# Patient Record
Sex: FEMALE | Race: BLACK OR AFRICAN AMERICAN | NOT HISPANIC OR LATINO | Employment: FULL TIME | URBAN - METROPOLITAN AREA
[De-identification: names, ages, dates, MRNs, and addresses within clinical notes are randomized per-mention and may not be internally consistent; named-entity substitution may affect disease eponyms.]

---

## 2023-03-23 PROBLEM — R73.01 IMPAIRED FASTING GLUCOSE: Status: ACTIVE | Noted: 2023-03-23

## 2023-03-23 PROBLEM — R03.0 PREHYPERTENSION: Status: ACTIVE | Noted: 2023-03-23

## 2023-03-23 PROBLEM — S93.409A SPRAIN OF ANKLE: Status: ACTIVE | Noted: 2023-03-23

## 2023-03-23 PROBLEM — R00.0 TACHYCARDIA: Status: ACTIVE | Noted: 2023-03-23

## 2023-03-23 PROBLEM — S33.5XXA LUMBAR SPRAIN: Status: ACTIVE | Noted: 2023-03-23

## 2023-03-23 PROBLEM — S92.324A CLOSED NONDISPLACED FRACTURE OF SECOND METATARSAL BONE OF RIGHT FOOT: Status: ACTIVE | Noted: 2023-03-23

## 2023-03-23 PROBLEM — M65.9 TENOSYNOVITIS OF LEFT WRIST: Status: ACTIVE | Noted: 2023-03-23

## 2023-03-23 PROBLEM — L30.9 ECZEMA: Status: ACTIVE | Noted: 2023-03-23

## 2023-03-23 PROBLEM — M25.671 DECREASED RANGE OF MOTION OF RIGHT ANKLE: Status: ACTIVE | Noted: 2023-03-23

## 2023-03-23 PROBLEM — R35.0 INCREASED FREQUENCY OF URINATION: Status: ACTIVE | Noted: 2023-03-23

## 2023-03-23 PROBLEM — N60.19 FIBROCYSTIC BREAST: Status: ACTIVE | Noted: 2023-03-23

## 2023-03-23 PROBLEM — Z98.890 S/P BREAST RECONSTRUCTION: Status: ACTIVE | Noted: 2023-03-23

## 2023-03-23 PROBLEM — E66.9 OBESITY: Status: ACTIVE | Noted: 2023-03-23

## 2023-03-23 PROBLEM — J02.9 SORE THROAT: Status: ACTIVE | Noted: 2023-03-23

## 2023-03-23 PROBLEM — E74.39 OTHER DISORDERS OF INTESTINAL CARBOHYDRATE ABSORPTION: Status: ACTIVE | Noted: 2023-03-23

## 2023-03-23 PROBLEM — J32.9 SINUSITIS: Status: ACTIVE | Noted: 2023-03-23

## 2023-03-23 PROBLEM — R92.2 DENSE BREAST: Status: ACTIVE | Noted: 2023-03-23

## 2023-03-23 PROBLEM — M25.532 LEFT WRIST PAIN: Status: ACTIVE | Noted: 2023-03-23

## 2023-03-23 PROBLEM — A60.00 HERPES GENITALIA: Status: ACTIVE | Noted: 2023-03-23

## 2023-03-23 PROBLEM — G47.33 OBSTRUCTIVE SLEEP APNEA SYNDROME: Status: ACTIVE | Noted: 2023-03-23

## 2023-03-23 PROBLEM — K76.0 FATTY LIVER: Status: ACTIVE | Noted: 2023-03-23

## 2023-03-23 PROBLEM — R53.83 FATIGUE: Status: ACTIVE | Noted: 2023-03-23

## 2023-03-23 PROBLEM — R29.898 IMPAIRED STRENGTH OF LOWER EXTREMITY: Status: ACTIVE | Noted: 2023-03-23

## 2023-03-23 PROBLEM — R82.90 ABNORMAL URINE FINDINGS: Status: ACTIVE | Noted: 2023-03-23

## 2023-03-23 PROBLEM — R10.32 ABDOMINAL PAIN, LLQ (LEFT LOWER QUADRANT): Status: ACTIVE | Noted: 2023-03-23

## 2023-03-23 PROBLEM — N39.0 LOWER URINARY TRACT INFECTIOUS DISEASE: Status: ACTIVE | Noted: 2023-03-23

## 2023-03-23 PROBLEM — S92.909A FRACTURE OF FOOT: Status: ACTIVE | Noted: 2023-03-23

## 2023-03-23 PROBLEM — M54.2 NECK PAIN: Status: ACTIVE | Noted: 2023-03-23

## 2023-03-23 PROBLEM — Z15.01 GENETIC SUSCEPTIBILITY TO MALIGNANT NEOPLASM OF BREAST: Status: ACTIVE | Noted: 2023-03-23

## 2023-03-23 PROBLEM — M65.932 TENOSYNOVITIS OF LEFT WRIST: Status: ACTIVE | Noted: 2023-03-23

## 2023-03-23 PROBLEM — R63.2 POLYPHAGIA: Status: ACTIVE | Noted: 2023-03-23

## 2023-03-23 PROBLEM — R92.30 DENSE BREAST: Status: ACTIVE | Noted: 2023-03-23

## 2023-03-23 PROBLEM — K21.00 GASTROESOPHAGEAL REFLUX DISEASE WITH ESOPHAGITIS: Status: ACTIVE | Noted: 2023-03-23

## 2023-03-23 PROBLEM — N76.0 BACTERIAL VAGINOSIS: Status: ACTIVE | Noted: 2023-03-23

## 2023-03-23 PROBLEM — M79.671 RIGHT FOOT PAIN: Status: ACTIVE | Noted: 2023-03-23

## 2023-03-23 PROBLEM — N76.0 VAGINITIS: Status: ACTIVE | Noted: 2023-03-23

## 2023-03-23 PROBLEM — S92.334A CLOSED NONDISPLACED FRACTURE OF THIRD METATARSAL BONE OF RIGHT FOOT: Status: ACTIVE | Noted: 2023-03-23

## 2023-03-23 PROBLEM — R07.89 ATYPICAL CHEST PAIN: Status: ACTIVE | Noted: 2023-03-23

## 2023-03-23 PROBLEM — M25.531 RIGHT WRIST PAIN: Status: ACTIVE | Noted: 2023-03-23

## 2023-03-23 PROBLEM — M25.579 JOINT PAIN, FOOT: Status: ACTIVE | Noted: 2023-03-23

## 2023-03-23 PROBLEM — M25.511 RIGHT SHOULDER PAIN: Status: ACTIVE | Noted: 2023-03-23

## 2023-03-23 PROBLEM — S93.621A: Status: ACTIVE | Noted: 2023-03-23

## 2023-03-23 PROBLEM — E55.9 VITAMIN D DEFICIENCY: Status: ACTIVE | Noted: 2023-03-23

## 2023-03-23 PROBLEM — R09.81 NASAL CONGESTION: Status: ACTIVE | Noted: 2023-03-23

## 2023-03-23 PROBLEM — B96.89 BACTERIAL VAGINOSIS: Status: ACTIVE | Noted: 2023-03-23

## 2023-03-23 PROBLEM — S92.344A CLOSED NONDISPLACED FRACTURE OF FOURTH METATARSAL BONE OF RIGHT FOOT: Status: ACTIVE | Noted: 2023-03-23

## 2023-03-23 PROBLEM — I48.91 AFIB (MULTI): Status: ACTIVE | Noted: 2023-03-23

## 2023-03-23 PROBLEM — K21.9 GASTROESOPHAGEAL REFLUX DISEASE: Status: ACTIVE | Noted: 2023-03-23

## 2023-03-23 RX ORDER — METOPROLOL SUCCINATE 25 MG/1
0.5 TABLET, EXTENDED RELEASE ORAL DAILY
COMMUNITY
Start: 2017-12-21 | End: 2023-11-27

## 2023-03-23 RX ORDER — IBUPROFEN 800 MG/1
800 TABLET ORAL
COMMUNITY
Start: 2013-11-13 | End: 2024-04-01 | Stop reason: SDUPTHER

## 2023-03-23 RX ORDER — TRIAMCINOLONE ACETONIDE 1 MG/G
OINTMENT TOPICAL 2 TIMES DAILY PRN
COMMUNITY
Start: 2020-01-29 | End: 2023-08-27 | Stop reason: ALTCHOICE

## 2023-03-23 RX ORDER — ERGOCALCIFEROL 1.25 MG/1
1 CAPSULE ORAL
COMMUNITY
Start: 2022-01-25 | End: 2023-03-30 | Stop reason: SDUPTHER

## 2023-03-23 RX ORDER — CYCLOBENZAPRINE HCL 5 MG
TABLET ORAL 3 TIMES DAILY PRN
COMMUNITY
Start: 2022-08-31 | End: 2023-03-30 | Stop reason: SDUPTHER

## 2023-03-30 ENCOUNTER — OFFICE VISIT (OUTPATIENT)
Dept: PRIMARY CARE | Facility: CLINIC | Age: 48
End: 2023-03-30
Payer: COMMERCIAL

## 2023-03-30 VITALS
WEIGHT: 218 LBS | DIASTOLIC BLOOD PRESSURE: 87 MMHG | HEIGHT: 65 IN | SYSTOLIC BLOOD PRESSURE: 126 MMHG | HEART RATE: 83 BPM | BODY MASS INDEX: 36.32 KG/M2

## 2023-03-30 DIAGNOSIS — I48.91 ATRIAL FIBRILLATION, UNSPECIFIED TYPE (MULTI): ICD-10-CM

## 2023-03-30 DIAGNOSIS — R00.0 TACHYCARDIA: Primary | ICD-10-CM

## 2023-03-30 DIAGNOSIS — E11.9 TYPE 2 DIABETES MELLITUS WITHOUT COMPLICATION, WITHOUT LONG-TERM CURRENT USE OF INSULIN (MULTI): ICD-10-CM

## 2023-03-30 DIAGNOSIS — E55.9 VITAMIN D DEFICIENCY: ICD-10-CM

## 2023-03-30 DIAGNOSIS — S33.5XXD LUMBAR SPRAIN, SUBSEQUENT ENCOUNTER: ICD-10-CM

## 2023-03-30 DIAGNOSIS — C50.919 FAMILIAL CANCER OF BREAST, UNSPECIFIED LATERALITY (MULTI): ICD-10-CM

## 2023-03-30 DIAGNOSIS — R03.0 PREHYPERTENSION: ICD-10-CM

## 2023-03-30 PROBLEM — Z90.13 HISTORY OF BILATERAL MASTECTOMY: Status: RESOLVED | Noted: 2022-11-30 | Resolved: 2023-03-30

## 2023-03-30 PROBLEM — D64.9 ANEMIA: Status: ACTIVE | Noted: 2022-11-30

## 2023-03-30 PROBLEM — E03.9 HYPOTHYROIDISM: Status: ACTIVE | Noted: 2022-11-30

## 2023-03-30 LAB
ALANINE AMINOTRANSFERASE (SGPT) (U/L) IN SER/PLAS: 35 U/L (ref 7–45)
ALBUMIN (G/DL) IN SER/PLAS: 4.4 G/DL (ref 3.4–5)
ALKALINE PHOSPHATASE (U/L) IN SER/PLAS: 73 U/L (ref 33–110)
ANION GAP IN SER/PLAS: 9 MMOL/L (ref 10–20)
ASPARTATE AMINOTRANSFERASE (SGOT) (U/L) IN SER/PLAS: 20 U/L (ref 9–39)
BACTERIA, URINE: ABNORMAL /HPF
BILIRUBIN TOTAL (MG/DL) IN SER/PLAS: 0.3 MG/DL (ref 0–1.2)
CALCIDIOL (25 OH VITAMIN D3) (NG/ML) IN SER/PLAS: 37 NG/ML
CALCIUM (MG/DL) IN SER/PLAS: 9.6 MG/DL (ref 8.6–10.6)
CARBON DIOXIDE, TOTAL (MMOL/L) IN SER/PLAS: 30 MMOL/L (ref 21–32)
CHLORIDE (MMOL/L) IN SER/PLAS: 109 MMOL/L (ref 98–107)
CHOLESTEROL (MG/DL) IN SER/PLAS: 171 MG/DL (ref 0–199)
CHOLESTEROL IN HDL (MG/DL) IN SER/PLAS: 56.6 MG/DL
CHOLESTEROL/HDL RATIO: 3
COBALAMIN (VITAMIN B12) (PG/ML) IN SER/PLAS: 295 PG/ML (ref 211–911)
CREATININE (MG/DL) IN SER/PLAS: 0.73 MG/DL (ref 0.5–1.05)
GFR FEMALE: >90 ML/MIN/1.73M2
GLUCOSE (MG/DL) IN SER/PLAS: 100 MG/DL (ref 74–99)
LDL: 104 MG/DL (ref 0–99)
MUCUS, URINE: ABNORMAL /LPF
POTASSIUM (MMOL/L) IN SER/PLAS: 4.1 MMOL/L (ref 3.5–5.3)
PROTEIN TOTAL: 6.6 G/DL (ref 6.4–8.2)
RBC, URINE: <1 /HPF (ref 0–5)
SODIUM (MMOL/L) IN SER/PLAS: 144 MMOL/L (ref 136–145)
SQUAMOUS EPITHELIAL CELLS, URINE: 2 /HPF
THYROTROPIN (MIU/L) IN SER/PLAS BY DETECTION LIMIT <= 0.05 MIU/L: 1.35 MIU/L (ref 0.44–3.98)
TRIGLYCERIDE (MG/DL) IN SER/PLAS: 52 MG/DL (ref 0–149)
UREA NITROGEN (MG/DL) IN SER/PLAS: 14 MG/DL (ref 6–23)
VLDL: 10 MG/DL (ref 0–40)
WBC, URINE: 2 /HPF (ref 0–5)

## 2023-03-30 PROCEDURE — 36415 COLL VENOUS BLD VENIPUNCTURE: CPT | Performed by: FAMILY MEDICINE

## 2023-03-30 PROCEDURE — 1036F TOBACCO NON-USER: CPT | Performed by: FAMILY MEDICINE

## 2023-03-30 PROCEDURE — 3074F SYST BP LT 130 MM HG: CPT | Performed by: FAMILY MEDICINE

## 2023-03-30 PROCEDURE — 83036 HEMOGLOBIN GLYCOSYLATED A1C: CPT

## 2023-03-30 PROCEDURE — 82306 VITAMIN D 25 HYDROXY: CPT

## 2023-03-30 PROCEDURE — 99214 OFFICE O/P EST MOD 30 MIN: CPT | Performed by: FAMILY MEDICINE

## 2023-03-30 PROCEDURE — 81001 URINALYSIS AUTO W/SCOPE: CPT

## 2023-03-30 PROCEDURE — 99396 PREV VISIT EST AGE 40-64: CPT | Performed by: FAMILY MEDICINE

## 2023-03-30 PROCEDURE — 82607 VITAMIN B-12: CPT

## 2023-03-30 PROCEDURE — 80061 LIPID PANEL: CPT

## 2023-03-30 PROCEDURE — 80053 COMPREHEN METABOLIC PANEL: CPT

## 2023-03-30 PROCEDURE — 3044F HG A1C LEVEL LT 7.0%: CPT | Performed by: FAMILY MEDICINE

## 2023-03-30 PROCEDURE — 84443 ASSAY THYROID STIM HORMONE: CPT

## 2023-03-30 PROCEDURE — 3079F DIAST BP 80-89 MM HG: CPT | Performed by: FAMILY MEDICINE

## 2023-03-30 PROCEDURE — 87086 URINE CULTURE/COLONY COUNT: CPT

## 2023-03-30 PROCEDURE — 85027 COMPLETE CBC AUTOMATED: CPT

## 2023-03-30 RX ORDER — CYCLOBENZAPRINE HCL 5 MG
5 TABLET ORAL 3 TIMES DAILY PRN
Qty: 30 TABLET | Refills: 1 | OUTPATIENT
Start: 2023-03-30 | End: 2024-05-14

## 2023-03-30 RX ORDER — ERGOCALCIFEROL 1.25 MG/1
50000 CAPSULE ORAL
Qty: 13 CAPSULE | Refills: 1 | Status: SHIPPED | OUTPATIENT
Start: 2023-03-30 | End: 2024-04-02 | Stop reason: SDUPTHER

## 2023-03-31 ENCOUNTER — TELEPHONE (OUTPATIENT)
Dept: PRIMARY CARE | Facility: CLINIC | Age: 48
End: 2023-03-31
Payer: COMMERCIAL

## 2023-03-31 LAB
ERYTHROCYTE DISTRIBUTION WIDTH (RATIO) BY AUTOMATED COUNT: 14.4 % (ref 11.5–14.5)
ERYTHROCYTE MEAN CORPUSCULAR HEMOGLOBIN CONCENTRATION (G/DL) BY AUTOMATED: 30.7 G/DL (ref 32–36)
ERYTHROCYTE MEAN CORPUSCULAR VOLUME (FL) BY AUTOMATED COUNT: 88 FL (ref 80–100)
ERYTHROCYTES (10*6/UL) IN BLOOD BY AUTOMATED COUNT: 5.18 X10E12/L (ref 4–5.2)
ESTIMATED AVERAGE GLUCOSE FOR HBA1C: 111 MG/DL
HEMATOCRIT (%) IN BLOOD BY AUTOMATED COUNT: 45.6 % (ref 36–46)
HEMOGLOBIN (G/DL) IN BLOOD: 14 G/DL (ref 12–16)
HEMOGLOBIN A1C/HEMOGLOBIN TOTAL IN BLOOD: 5.5 %
LEUKOCYTES (10*3/UL) IN BLOOD BY AUTOMATED COUNT: 5.1 X10E9/L (ref 4.4–11.3)
NRBC (PER 100 WBCS) BY AUTOMATED COUNT: 0 /100 WBC (ref 0–0)
PLATELETS (10*3/UL) IN BLOOD AUTOMATED COUNT: 257 X10E9/L (ref 150–450)

## 2023-03-31 NOTE — TELEPHONE ENCOUNTER
----- Message from Macho Hicks MD sent at 3/31/2023  4:34 AM EDT -----  Please call the patient regarding her abnormal result.  B12 low rec b12 inj 1000 mcg weekly for 4 weeks then once a month  All other labs normal NO Diabetes.

## 2023-04-01 PROBLEM — E53.8 VITAMIN B12 DEFICIENCY: Status: ACTIVE | Noted: 2023-04-01

## 2023-04-01 NOTE — PROGRESS NOTES
Reason for Visit: Annual Physical Exam    HPI:was seeing different primary for weight loss/       Active Problem List  Patient Active Problem List   Diagnosis    Abdominal pain, LLQ (left lower quadrant)    Abnormal urine findings    Afib (CMS/HCC)    Atypical chest pain    Bacterial vaginosis    Closed nondisplaced fracture of fourth metatarsal bone of right foot    Closed nondisplaced fracture of second metatarsal bone of right foot    Closed nondisplaced fracture of third metatarsal bone of right foot    Dense breast    Decreased range of motion of right ankle    Eczema    Fatigue    Fatty liver    Fibrocystic breast    Fracture of foot    Gastroesophageal reflux disease    Gastroesophageal reflux disease with esophagitis    Genetic susceptibility to malignant neoplasm of breast    Herpes genitalia    Impaired fasting glucose    Impaired strength of lower extremity    Increased frequency of urination    Left wrist pain    Right wrist pain    Lower urinary tract infectious disease    Lumbar sprain    Nasal congestion    Neck pain    Obesity    Obstructive sleep apnea syndrome    Other disorders of intestinal carbohydrate absorption    Polyphagia    Prehypertension    Right foot pain    Joint pain, foot    Right shoulder pain    S/P breast reconstruction    Sinusitis    Sore throat    Sprain of ankle    Sprain of ligament of tarsometatarsal joint, right, initial encounter    Tachycardia    Tenosynovitis of left wrist    Vaginitis    Vitamin D deficiency    Anemia    Familial cancer of breast (CMS/HCC)    Hypothyroidism    Type 2 diabetes mellitus (CMS/HCC)    Vitamin B12 deficiency       Comprehensive Medical/Surgical/Social/Family History  Past Medical History:   Diagnosis Date    Encounter for screening for malignant neoplasm of cervix 02/05/2016    Pap smear for cervical cancer screening    History of bilateral mastectomy 11/30/2022    Other specified health status     Medical history non-contributory     Past  "Surgical History:   Procedure Laterality Date     SECTION, CLASSIC  2016     Section    MR PELVIS ANGIO W IV CONTRAST  2016    MR PELVIS ANGIO W IV CONTRAST 2016 [facility]    OTHER SURGICAL HISTORY  2016    Breast Reconstruction With Tissue Expander Bilateral    OTHER SURGICAL HISTORY  2016    Simple Mastectomy Bilateral    OTHER SURGICAL HISTORY  2016    Oophorectomy - Bilat (Removal Of Both Ovaries) Laparoscopic    OTHER SURGICAL HISTORY  2016    Breast Surgery Reconstruction With Free Flap     Social History     Social History Narrative    Not on file         Allergies and Medications  Patient has no known allergies.  Current Outpatient Medications on File Prior to Visit   Medication Sig Dispense Refill    ibuprofen 800 mg tablet Take 1 tablet (800 mg) by mouth. W FOOD FOR 10 DAYS      metoprolol succinate XL (Toprol-XL) 25 mg 24 hr tablet Take 0.5 tablets (12.5 mg) by mouth once daily.      triamcinolone (Kenalog) 0.1 % ointment Apply topically 2 times a day as needed.      [DISCONTINUED] cyclobenzaprine (Flexeril) 5 mg tablet Take by mouth 3 times a day as needed. 1-2 TABLETS PRN      [DISCONTINUED] ergocalciferol (Vitamin D-2) 1.25 MG (78005 UT) capsule Take 1 capsule (50,000 Units) by mouth 1 (one) time per week. 13 capsule 1    [DISCONTINUED] ergocalciferol (Vitamin D-2) 1.25 MG (78259 UT) capsule Take 1 capsule (1,250 mcg) by mouth 1 (one) time per week.       No current facility-administered medications on file prior to visit.         ROS otherwise negative aside from what was mentioned above in HPI.    Vitals  Blood Pressure 126/87   Pulse 83   Height 1.651 m (5' 5\")   Weight 98.9 kg (218 lb)   Body Mass Index 36.28 kg/m²   Body mass index is 36.28 kg/m².  Physical Exam  Gen: Alert, NAD  HEENT:  PERRLA, EOMI, conjunctiva and sclera normal in appearance. External auditory canals/TMs normal; Oral cavity and posterior pharynx without " lesions/exudate  Neck:  Supple with FROM; No masses/nodes palpable; Thyroid nontender and without nodules; No CLAY  Respiratory:  Lungs CTAB  Cardiovascular:  Heart RRR. No M/R/G. Peripheral pulses equal bilaterally  Abdomen:  Soft, nontender, BS present throughout; No R/G/R; No HSM or masses palpated  Extremities:  FROM all extremities; Muscle strength grossly normal with good tone  Neuro:  CN II-XII intact; Reflexes 2+/2+; Gross motor and sensory intact  Skin:  No suspicious lesions present  Breast: No masses, skin lesions or nipple discharges, no axillary lymphadenopathy    Assessment and Plan:  Problem List Items Addressed This Visit          Circulatory    Afib (CMS/[facility])    Current Assessment & Plan     C/w metoprolol         Prehypertension    Tachycardia - Primary    Current Assessment & Plan     C/w metoprolol         Relevant Medications    cyclobenzaprine (Flexeril) 5 mg tablet    Other Relevant Orders    CBC (Completed)    Comprehensive Metabolic Panel (Completed)    TSH with reflex to Free T4 if abnormal (Completed)       Musculoskeletal    Lumbar sprain    Current Assessment & Plan     Start Home exercise program            Endocrine/Metabolic    Vitamin D deficiency    Relevant Medications    ergocalciferol (Vitamin D-2) 1.25 MG (08033 UT) capsule    Other Relevant Orders    Vitamin D, Total (Completed)    Type 2 diabetes mellitus (CMS/[facility])    Relevant Orders    Lipid Panel (Completed)    Vitamin B12 (Completed)    Hemoglobin A1c (Completed)    Urinalysis Microscopic Only (Completed)    Urine Culture       Other    Familial cancer of breast (CMS/[facility])

## 2023-04-03 ENCOUNTER — TELEPHONE (OUTPATIENT)
Dept: PRIMARY CARE | Facility: CLINIC | Age: 48
End: 2023-04-03
Payer: COMMERCIAL

## 2023-04-03 DIAGNOSIS — E53.8 VITAMIN B12 DEFICIENCY: ICD-10-CM

## 2023-04-04 DIAGNOSIS — E53.8 VITAMIN B12 DEFICIENCY: ICD-10-CM

## 2023-04-04 LAB — URINE CULTURE: ABNORMAL

## 2023-04-06 RX ORDER — CYANOCOBALAMIN 1000 UG/ML
1000 INJECTION, SOLUTION INTRAMUSCULAR; SUBCUTANEOUS
Qty: 3 ML | Refills: 1 | Status: SHIPPED | OUTPATIENT
Start: 2023-04-06 | End: 2023-05-25

## 2023-05-25 DIAGNOSIS — E53.8 VITAMIN B12 DEFICIENCY: ICD-10-CM

## 2023-05-25 RX ORDER — CYANOCOBALAMIN 1000 UG/ML
1000 INJECTION, SOLUTION INTRAMUSCULAR; SUBCUTANEOUS
Qty: 1 ML | Refills: 5 | Status: SHIPPED | OUTPATIENT
Start: 2023-05-25 | End: 2024-01-26

## 2023-06-22 LAB — URINE CULTURE: NORMAL

## 2023-08-27 ASSESSMENT — ENCOUNTER SYMPTOMS
DIARRHEA: 1
VOMITING: 0
FLATUS: 1
MYALGIAS: 0
FREQUENCY: 0
DYSURIA: 1
ANOREXIA: 0
HEMATOCHEZIA: 0
WEIGHT LOSS: 0
ARTHRALGIAS: 0
HEADACHES: 0
HEMATURIA: 0
FEVER: 0
CONSTIPATION: 1
NAUSEA: 0
BELCHING: 0
ABDOMINAL PAIN: 1

## 2023-08-28 ENCOUNTER — OFFICE VISIT (OUTPATIENT)
Dept: PRIMARY CARE | Facility: CLINIC | Age: 48
End: 2023-08-28
Payer: COMMERCIAL

## 2023-08-28 VITALS
HEIGHT: 65 IN | DIASTOLIC BLOOD PRESSURE: 87 MMHG | SYSTOLIC BLOOD PRESSURE: 127 MMHG | BODY MASS INDEX: 37.49 KG/M2 | HEART RATE: 77 BPM | WEIGHT: 225 LBS

## 2023-08-28 DIAGNOSIS — R10.32 LLQ PAIN: Primary | ICD-10-CM

## 2023-08-28 DIAGNOSIS — R03.0 PREHYPERTENSION: ICD-10-CM

## 2023-08-28 DIAGNOSIS — K21.9 GASTROESOPHAGEAL REFLUX DISEASE WITHOUT ESOPHAGITIS: ICD-10-CM

## 2023-08-28 PROBLEM — E11.9 TYPE 2 DIABETES MELLITUS (MULTI): Status: RESOLVED | Noted: 2022-11-30 | Resolved: 2023-08-28

## 2023-08-28 LAB
ANION GAP IN SER/PLAS: 11 MMOL/L (ref 10–20)
BASOPHILS (10*3/UL) IN BLOOD BY AUTOMATED COUNT: 0.02 X10E9/L (ref 0–0.1)
BASOPHILS/100 LEUKOCYTES IN BLOOD BY AUTOMATED COUNT: 0.4 % (ref 0–2)
CALCIUM (MG/DL) IN SER/PLAS: 9.7 MG/DL (ref 8.6–10.6)
CARBON DIOXIDE, TOTAL (MMOL/L) IN SER/PLAS: 29 MMOL/L (ref 21–32)
CHLORIDE (MMOL/L) IN SER/PLAS: 104 MMOL/L (ref 98–107)
CREATININE (MG/DL) IN SER/PLAS: 0.69 MG/DL (ref 0.5–1.05)
EOSINOPHILS (10*3/UL) IN BLOOD BY AUTOMATED COUNT: 0.09 X10E9/L (ref 0–0.7)
EOSINOPHILS/100 LEUKOCYTES IN BLOOD BY AUTOMATED COUNT: 2 % (ref 0–6)
ERYTHROCYTE DISTRIBUTION WIDTH (RATIO) BY AUTOMATED COUNT: 14 % (ref 11.5–14.5)
ERYTHROCYTE MEAN CORPUSCULAR HEMOGLOBIN CONCENTRATION (G/DL) BY AUTOMATED: 31.7 G/DL (ref 32–36)
ERYTHROCYTE MEAN CORPUSCULAR VOLUME (FL) BY AUTOMATED COUNT: 89 FL (ref 80–100)
ERYTHROCYTES (10*6/UL) IN BLOOD BY AUTOMATED COUNT: 5.2 X10E12/L (ref 4–5.2)
GFR FEMALE: >90 ML/MIN/1.73M2
GLUCOSE (MG/DL) IN SER/PLAS: 95 MG/DL (ref 74–99)
HEMATOCRIT (%) IN BLOOD BY AUTOMATED COUNT: 46.1 % (ref 36–46)
HEMOGLOBIN (G/DL) IN BLOOD: 14.6 G/DL (ref 12–16)
IMMATURE GRANULOCYTES/100 LEUKOCYTES IN BLOOD BY AUTOMATED COUNT: 0.2 % (ref 0–0.9)
LEUKOCYTES (10*3/UL) IN BLOOD BY AUTOMATED COUNT: 4.6 X10E9/L (ref 4.4–11.3)
LYMPHOCYTES (10*3/UL) IN BLOOD BY AUTOMATED COUNT: 2.32 X10E9/L (ref 1.2–4.8)
LYMPHOCYTES/100 LEUKOCYTES IN BLOOD BY AUTOMATED COUNT: 50.4 % (ref 13–44)
MONOCYTES (10*3/UL) IN BLOOD BY AUTOMATED COUNT: 0.35 X10E9/L (ref 0.1–1)
MONOCYTES/100 LEUKOCYTES IN BLOOD BY AUTOMATED COUNT: 7.6 % (ref 2–10)
NEUTROPHILS (10*3/UL) IN BLOOD BY AUTOMATED COUNT: 1.81 X10E9/L (ref 1.2–7.7)
NEUTROPHILS/100 LEUKOCYTES IN BLOOD BY AUTOMATED COUNT: 39.4 % (ref 40–80)
NRBC (PER 100 WBCS) BY AUTOMATED COUNT: 0 /100 WBC (ref 0–0)
PLATELETS (10*3/UL) IN BLOOD AUTOMATED COUNT: 267 X10E9/L (ref 150–450)
POTASSIUM (MMOL/L) IN SER/PLAS: 4.2 MMOL/L (ref 3.5–5.3)
SODIUM (MMOL/L) IN SER/PLAS: 140 MMOL/L (ref 136–145)
UREA NITROGEN (MG/DL) IN SER/PLAS: 10 MG/DL (ref 6–23)

## 2023-08-28 PROCEDURE — 85025 COMPLETE CBC W/AUTO DIFF WBC: CPT

## 2023-08-28 PROCEDURE — 1036F TOBACCO NON-USER: CPT | Performed by: FAMILY MEDICINE

## 2023-08-28 PROCEDURE — 99214 OFFICE O/P EST MOD 30 MIN: CPT | Performed by: FAMILY MEDICINE

## 2023-08-28 PROCEDURE — 80048 BASIC METABOLIC PNL TOTAL CA: CPT

## 2023-08-28 RX ORDER — PANTOPRAZOLE SODIUM 40 MG/1
40 TABLET, DELAYED RELEASE ORAL DAILY
Qty: 90 TABLET | Refills: 1 | Status: SHIPPED | OUTPATIENT
Start: 2023-08-28 | End: 2024-05-29

## 2023-08-28 ASSESSMENT — PATIENT HEALTH QUESTIONNAIRE - PHQ9
SUM OF ALL RESPONSES TO PHQ9 QUESTIONS 1 AND 2: 0
1. LITTLE INTEREST OR PLEASURE IN DOING THINGS: NOT AT ALL
2. FEELING DOWN, DEPRESSED OR HOPELESS: NOT AT ALL

## 2023-09-04 PROBLEM — R10.32 LLQ PAIN: Status: ACTIVE | Noted: 2023-09-04

## 2023-09-04 ASSESSMENT — ENCOUNTER SYMPTOMS
FATIGUE: 1
FREQUENCY: 0
ABDOMINAL PAIN: 1
RESPIRATORY NEGATIVE: 1
NAUSEA: 0
BELCHING: 0
DIARRHEA: 1
HEMATOCHEZIA: 0
WEIGHT LOSS: 0
HEMATURIA: 0
ARTHRALGIAS: 0
CONSTIPATION: 1
APPETITE CHANGE: 1
HEADACHES: 0
DYSURIA: 1
VOMITING: 0
ANOREXIA: 0
FEVER: 0
FLATUS: 1
MYALGIAS: 0

## 2023-09-04 NOTE — PROGRESS NOTES
Subjective   Patient ID: Ayanna Willard is a 47 y.o. female who presents for Follow-up (Abd pain 1 month ago ), Knee Pain (Left knee fall), and Abdominal Pain.      Knee Pain     Abdominal Pain  This is a recurrent problem. The current episode started more than 1 month ago. The onset quality is gradual. The problem occurs daily. The problem has been waxing and waning. The pain is located in the LLQ. The pain is at a severity of 5/10. The quality of the pain is sharp. The abdominal pain does not radiate. Associated symptoms include constipation, diarrhea, dysuria and flatus. Pertinent negatives include no anorexia, arthralgias, belching, fever, frequency, headaches, hematochezia, hematuria, melena, myalgias, nausea, vomiting or weight loss. The pain is aggravated by bowel movement and urination.     Current Outpatient Medications on File Prior to Visit   Medication Sig Dispense Refill    cyanocobalamin (Vitamin B-12) 1,000 mcg/mL injection INJECT 1 ML (1,000 MCG) INTO THE SHOULDER, THIGH, OR BUTTOCKS EVERY 30 (THIRTY) DAYS. 1 mL 5    cyclobenzaprine (Flexeril) 5 mg tablet Take 1 tablet (5 mg) by mouth 3 times a day as needed for muscle spasms. 1-2 TABLETS PRN 30 tablet 1    ergocalciferol (Vitamin D-2) 1.25 MG (81850 UT) capsule Take 1 capsule (50,000 Units) by mouth 1 (one) time per week. 13 capsule 1    ibuprofen 800 mg tablet Take 1 tablet (800 mg) by mouth. W FOOD FOR 10 DAYS      metoprolol succinate XL (Toprol-XL) 25 mg 24 hr tablet Take 0.5 tablets (12.5 mg) by mouth once daily.       No current facility-administered medications on file prior to visit.        Review of Systems   Constitutional:  Positive for appetite change and fatigue. Negative for fever and weight loss.   HENT: Negative.     Respiratory: Negative.     Gastrointestinal:  Positive for abdominal pain, constipation, diarrhea and flatus. Negative for anorexia, hematochezia, melena, nausea and vomiting.   Genitourinary:  Positive for dysuria.  "Negative for frequency and hematuria.   Musculoskeletal:  Negative for arthralgias and myalgias.   Neurological:  Negative for headaches.       Objective   Blood Pressure 127/87   Pulse 77   Height 1.651 m (5' 5\")   Weight 102 kg (225 lb)   Body Mass Index 37.44 kg/m²   BSA: 2.16 meters squared  Growth percentiles: Facility age limit for growth %dima is 20 years. Facility age limit for growth %dima is 20 years.   Office Visit on 08/28/2023   Component Date Value Ref Range Status    Glucose 08/28/2023 95  74 - 99 mg/dL Final    Sodium 08/28/2023 140  136 - 145 mmol/L Final    Potassium 08/28/2023 4.2  3.5 - 5.3 mmol/L Final    Chloride 08/28/2023 104  98 - 107 mmol/L Final    Bicarbonate 08/28/2023 29  21 - 32 mmol/L Final    Anion Gap 08/28/2023 11  10 - 20 mmol/L Final    Urea Nitrogen 08/28/2023 10  6 - 23 mg/dL Final    Creatinine 08/28/2023 0.69  0.50 - 1.05 mg/dL Final    GFR Female 08/28/2023 >90  >90 mL/min/1.73m2 Final     CALCULATIONS OF ESTIMATED GFR ARE PERFORMED   USING THE 2021 CKD-EPI STUDY REFIT EQUATION   WITHOUT THE RACE VARIABLE FOR THE IDMS-TRACEABLE   CREATININE METHODS.    https://jasn.asnjournals.org/content/early/2021/09/22/ASN.3962101247    Calcium 08/28/2023 9.7  8.6 - 10.6 mg/dL Final    WBC 08/28/2023 4.6  4.4 - 11.3 x10E9/L Final    nRBC 08/28/2023 0.0  0.0 - 0.0 /100 WBC Final    RBC 08/28/2023 5.20  4.00 - 5.20 x10E12/L Final    Hemoglobin 08/28/2023 14.6  12.0 - 16.0 g/dL Final    Hematocrit 08/28/2023 46.1 (H)  36.0 - 46.0 % Final    MCV 08/28/2023 89  80 - 100 fL Final    MCHC 08/28/2023 31.7 (L)  32.0 - 36.0 g/dL Final    Platelets 08/28/2023 267  150 - 450 x10E9/L Final    RDW 08/28/2023 14.0  11.5 - 14.5 % Final    Neutrophils % 08/28/2023 39.4  40.0 - 80.0 % Final    Immature Granulocytes %, Automated 08/28/2023 0.2  0.0 - 0.9 % Final     Immature Granulocyte Count (IG) includes promyelocytes,    myelocytes and metamyelocytes but does not include bands.   Percent differential " counts (%) should be interpreted in the   context of the absolute cell counts (cells/L).    Lymphocytes % 08/28/2023 50.4  13.0 - 44.0 % Final    Monocytes % 08/28/2023 7.6  2.0 - 10.0 % Final    Eosinophils % 08/28/2023 2.0  0.0 - 6.0 % Final    Basophils % 08/28/2023 0.4  0.0 - 2.0 % Final    Neutrophils Absolute 08/28/2023 1.81  1.20 - 7.70 x10E9/L Final    Lymphocytes Absolute 08/28/2023 2.32  1.20 - 4.80 x10E9/L Final    Monocytes Absolute 08/28/2023 0.35  0.10 - 1.00 x10E9/L Final    Eosinophils Absolute 08/28/2023 0.09  0.00 - 0.70 x10E9/L Final    Basophils Absolute 08/28/2023 0.02  0.00 - 0.10 x10E9/L Final      Physical Exam  Vitals and nursing note reviewed.   Constitutional:       Appearance: Normal appearance.   HENT:      Head: Normocephalic and atraumatic.      Right Ear: Tympanic membrane normal.      Left Ear: Tympanic membrane normal.      Nose: Nose normal.      Mouth/Throat:      Mouth: Mucous membranes are moist.   Eyes:      Pupils: Pupils are equal, round, and reactive to light.   Cardiovascular:      Rate and Rhythm: Normal rate and regular rhythm.      Pulses: Normal pulses.      Heart sounds: Normal heart sounds.   Pulmonary:      Effort: Pulmonary effort is normal.      Breath sounds: Normal breath sounds.   Abdominal:      General: Abdomen is flat. Bowel sounds are normal.      Palpations: Abdomen is soft.      Tenderness: There is abdominal tenderness.      Comments: Left lower quadrant tenderness   Musculoskeletal:         General: Normal range of motion.      Cervical back: Normal range of motion and neck supple.   Skin:     General: Skin is warm and dry.      Capillary Refill: Capillary refill takes less than 2 seconds.   Neurological:      General: No focal deficit present.      Mental Status: She is alert and oriented to person, place, and time.   Psychiatric:         Mood and Affect: Mood normal.         Assessment/Plan   Problem List Items Addressed This Visit        Gastroesophageal reflux disease    Relevant Medications    pantoprazole (ProtoNix) 40 mg EC tablet    Prehypertension     Continue medication         LLQ pain - Primary    Relevant Orders    CT abdomen pelvis w IV contrast    Basic metabolic panel (Completed)    CBC and Auto Differential (Completed)

## 2023-09-14 ENCOUNTER — TELEPHONE (OUTPATIENT)
Dept: PRIMARY CARE | Facility: CLINIC | Age: 48
End: 2023-09-14
Payer: COMMERCIAL

## 2023-09-14 NOTE — TELEPHONE ENCOUNTER
Pt was billed 214.96 from her visit 8/28. Pt says she no longer has [employer] and I updated her insurance to [place]. Please fix billing.

## 2023-09-15 DIAGNOSIS — K21.9 GASTROESOPHAGEAL REFLUX DISEASE, UNSPECIFIED WHETHER ESOPHAGITIS PRESENT: ICD-10-CM

## 2023-09-15 DIAGNOSIS — R10.32 LLQ PAIN: ICD-10-CM

## 2023-10-04 ENCOUNTER — OFFICE VISIT (OUTPATIENT)
Dept: GASTROENTEROLOGY | Facility: CLINIC | Age: 48
End: 2023-10-04
Payer: COMMERCIAL

## 2023-10-04 VITALS — WEIGHT: 232 LBS | BODY MASS INDEX: 38.65 KG/M2 | HEART RATE: 92 BPM | HEIGHT: 65 IN

## 2023-10-04 DIAGNOSIS — R10.32 ABDOMINAL PAIN, LLQ (LEFT LOWER QUADRANT): Primary | ICD-10-CM

## 2023-10-04 DIAGNOSIS — Z86.010 PERSONAL HISTORY OF COLONIC POLYPS: ICD-10-CM

## 2023-10-04 PROCEDURE — 1036F TOBACCO NON-USER: CPT | Performed by: INTERNAL MEDICINE

## 2023-10-04 PROCEDURE — 99214 OFFICE O/P EST MOD 30 MIN: CPT | Performed by: INTERNAL MEDICINE

## 2023-10-04 RX ORDER — POLYETHYLENE GLYCOL 3350, SODIUM SULFATE ANHYDROUS, SODIUM BICARBONATE, SODIUM CHLORIDE, POTASSIUM CHLORIDE 236; 22.74; 6.74; 5.86; 2.97 G/4L; G/4L; G/4L; G/4L; G/4L
4000 POWDER, FOR SOLUTION ORAL ONCE
Qty: 4000 ML | Refills: 0 | Status: SHIPPED | OUTPATIENT
Start: 2023-10-04 | End: 2023-10-04

## 2023-10-04 ASSESSMENT — ENCOUNTER SYMPTOMS
DEPRESSION: 0
ROS GI COMMENTS: SEE HPI
OCCASIONAL FEELINGS OF UNSTEADINESS: 0
LOSS OF SENSATION IN FEET: 0

## 2023-10-04 NOTE — PROGRESS NOTES
Subjective     History of Present Illness:   Ayanna Willard is a 48 y.o. female who presents to GI clinic for LLQ pain for about 6 months. Off and on more intense. Worse with BM and urination.  She thinks that it is always there to some degree but much more noticeable at some times.  Not affected by meals, activity, position, or time of day.    Began several months ago.  Head CT --  Question inflammatory changes in sigmoid question appendageal epiploica also with question of cecal thickening.    Review of Systems  Review of Systems   Gastrointestinal:         See HPI   All other systems reviewed and are negative.      Social History   reports that she has never smoked. She has never used smokeless tobacco. She reports that she does not drink alcohol and does not use drugs.     Allergies  No Known Allergies    Medications  Current Outpatient Medications   Medication Instructions    cyanocobalamin (VITAMIN B-12) 1,000 mcg, intramuscular, Every 30 days    cyclobenzaprine (FLEXERIL) 5 mg, oral, 3 times daily PRN, 1-2 TABLETS PRN    ergocalciferol (VITAMIN D-2) 50,000 Units, oral, Weekly    ibuprofen 800 mg, oral, W FOOD FOR 10 DAYS    metoprolol succinate XL (Toprol-XL) 25 mg 24 hr tablet 0.5 tablets, oral, Daily    pantoprazole (PROTONIX) 40 mg, oral, Daily, Do not crush, chew, or split.        Objective   Visit Vitals  Pulse 92      Physical Exam  Vitals reviewed.   Constitutional:       General: She is awake.   Cardiovascular:      Rate and Rhythm: Normal rate and regular rhythm.   Pulmonary:      Effort: Pulmonary effort is normal.      Breath sounds: Normal breath sounds.   Abdominal:      General: Bowel sounds are normal. There is no distension.      Palpations: Abdomen is soft.      Tenderness: There is abdominal tenderness in the left lower quadrant.   Neurological:      Mental Status: She is alert and oriented to person, place, and time.   Psychiatric:         Attention and Perception: Attention and perception  "normal.         Behavior: Behavior normal.                     No lab exists for component: \"LABALBU\"          Assessment/Plan   Ayanna Willard is a 48 y.o. female who presents to GI clinic for left lower quadrant pain that began about 6 months ago.  CT scan showed possible inflammatory changes in the sigmoid and possible thickening in the cecum.  Last colonoscopy 12/2020.  History of adenomatous polyps of colon.  Unlikely to represent neoplasm but amatory changes are consideration.  However symptoms have been very chronic, low-grade, and without other signs suggesting inflammation.    Plan    Colonoscopy        Nasim Taylor MD         "

## 2023-10-04 NOTE — LETTER
October 4, 2023       No Recipients    Patient: Ayanna Willard   YOB: 1975   Date of Visit: 10/4/2023       Dear Dr. Burt Recipients:    Thank you for referring Ayanna Willard to me for evaluation. Below are my notes for this consultation.  If you have questions, please do not hesitate to call me. I look forward to following your patient along with you.       Sincerely,     Nasim Taylor MD      CC:   No Recipients  ______________________________________________________________________________________    Subjective    History of Present Illness:   Ayanna Willard is a 48 y.o. female who presents to GI clinic for LLQ pain for about 6 months. Off and on more intense. Worse with BM and urination.  She thinks that it is always there to some degree but much more noticeable at some times.  Not affected by meals, activity, position, or time of day.    Began several months ago.  Head CT --  Question inflammatory changes in sigmoid question appendageal epiploica also with question of cecal thickening.    Review of Systems  Review of Systems   Gastrointestinal:         See HPI   All other systems reviewed and are negative.      Social History   reports that she has never smoked. She has never used smokeless tobacco. She reports that she does not drink alcohol and does not use drugs.     Allergies  No Known Allergies    Medications  Current Outpatient Medications   Medication Instructions   • cyanocobalamin (VITAMIN B-12) 1,000 mcg, intramuscular, Every 30 days   • cyclobenzaprine (FLEXERIL) 5 mg, oral, 3 times daily PRN, 1-2 TABLETS PRN   • ergocalciferol (VITAMIN D-2) 50,000 Units, oral, Weekly   • ibuprofen 800 mg, oral, W FOOD FOR 10 DAYS   • metoprolol succinate XL (Toprol-XL) 25 mg 24 hr tablet 0.5 tablets, oral, Daily   • pantoprazole (PROTONIX) 40 mg, oral, Daily, Do not crush, chew, or split.        Objective  Visit Vitals  Pulse 92      Physical Exam  Vitals reviewed.   Constitutional:       General:  "She is awake.   Cardiovascular:      Rate and Rhythm: Normal rate and regular rhythm.   Pulmonary:      Effort: Pulmonary effort is normal.      Breath sounds: Normal breath sounds.   Abdominal:      General: Bowel sounds are normal. There is no distension.      Palpations: Abdomen is soft.      Tenderness: There is abdominal tenderness in the left lower quadrant.   Neurological:      Mental Status: She is alert and oriented to person, place, and time.   Psychiatric:         Attention and Perception: Attention and perception normal.         Behavior: Behavior normal.                     No lab exists for component: \"LABALBU\"          Assessment/Plan  Ayanna Willard is a 48 y.o. female who presents to GI clinic for left lower quadrant pain that began about 6 months ago.  CT scan showed possible inflammatory changes in the sigmoid and possible thickening in the cecum.  Last colonoscopy 12/2020.  History of adenomatous polyps of colon.  Unlikely to represent neoplasm but amatory changes are consideration.  However symptoms have been very chronic, low-grade, and without other signs suggesting inflammation.    Plan    Colonoscopy        Nasim Taylor MD       "

## 2023-10-31 ENCOUNTER — OFFICE VISIT (OUTPATIENT)
Dept: GASTROENTEROLOGY | Facility: EXTERNAL LOCATION | Age: 48
End: 2023-10-31
Payer: COMMERCIAL

## 2023-10-31 DIAGNOSIS — Z86.010 PERSONAL HISTORY OF COLONIC POLYPS: ICD-10-CM

## 2023-10-31 DIAGNOSIS — R10.32 LEFT LOWER QUADRANT PAIN: ICD-10-CM

## 2023-10-31 DIAGNOSIS — Z12.11 SPECIAL SCREENING FOR MALIGNANT NEOPLASMS, COLON: Primary | ICD-10-CM

## 2023-10-31 PROCEDURE — 1036F TOBACCO NON-USER: CPT | Performed by: INTERNAL MEDICINE

## 2023-10-31 PROCEDURE — 45378 DIAGNOSTIC COLONOSCOPY: CPT | Performed by: INTERNAL MEDICINE

## 2023-11-01 ENCOUNTER — TELEPHONE (OUTPATIENT)
Dept: GASTROENTEROLOGY | Facility: CLINIC | Age: 48
End: 2023-11-01
Payer: COMMERCIAL

## 2023-11-01 RX ORDER — POLYETHYLENE GLYCOL-3350 AND ELECTROLYTES 236; 6.74; 5.86; 2.97; 22.74 G/274.31G; G/274.31G; G/274.31G; G/274.31G; G/274.31G
POWDER, FOR SOLUTION ORAL
COMMUNITY
Start: 2023-10-21 | End: 2023-11-04 | Stop reason: ALTCHOICE

## 2023-11-01 NOTE — TELEPHONE ENCOUNTER
Ayanna had a colonoscopy yesterday and is experiencing sharp pains when she urinates. She states the pain is her lower left quadrant.  
Today still has some of the sharp pain in the left lower quadrant that she was having yesterday when she left after colonoscopy but now occurs just when she urinates and then goes away.  Told her likely still some air trapping increased with Valsalva and to let me know if it fails to resolve.  
5

## 2023-11-02 ENCOUNTER — OFFICE VISIT (OUTPATIENT)
Dept: PRIMARY CARE | Facility: CLINIC | Age: 48
End: 2023-11-02
Payer: COMMERCIAL

## 2023-11-02 VITALS
WEIGHT: 228.8 LBS | DIASTOLIC BLOOD PRESSURE: 79 MMHG | BODY MASS INDEX: 38.12 KG/M2 | HEART RATE: 71 BPM | HEIGHT: 65 IN | SYSTOLIC BLOOD PRESSURE: 114 MMHG

## 2023-11-02 DIAGNOSIS — E66.01 CLASS 2 SEVERE OBESITY WITH SERIOUS COMORBIDITY AND BODY MASS INDEX (BMI) OF 35.0 TO 35.9 IN ADULT, UNSPECIFIED OBESITY TYPE (MULTI): Primary | ICD-10-CM

## 2023-11-02 DIAGNOSIS — E66.01 CLASS 2 SEVERE OBESITY WITH SERIOUS COMORBIDITY AND BODY MASS INDEX (BMI) OF 38.0 TO 38.9 IN ADULT, UNSPECIFIED OBESITY TYPE (MULTI): ICD-10-CM

## 2023-11-02 DIAGNOSIS — R10.32 ABDOMINAL PAIN, LLQ (LEFT LOWER QUADRANT): ICD-10-CM

## 2023-11-02 DIAGNOSIS — R10.30 LOWER ABDOMINAL PAIN: ICD-10-CM

## 2023-11-02 LAB
POC APPEARANCE, URINE: CLEAR
POC BILIRUBIN, URINE: NEGATIVE
POC BLOOD, URINE: NEGATIVE
POC COLOR, URINE: YELLOW
POC GLUCOSE, URINE: NEGATIVE MG/DL
POC KETONES, URINE: NEGATIVE MG/DL
POC LEUKOCYTES, URINE: NEGATIVE
POC NITRITE,URINE: NEGATIVE
POC PH, URINE: 6 PH
POC PROTEIN, URINE: NEGATIVE MG/DL
POC SPECIFIC GRAVITY, URINE: 1.01
POC UROBILINOGEN, URINE: 0.2 EU/DL

## 2023-11-02 PROCEDURE — 1036F TOBACCO NON-USER: CPT | Performed by: FAMILY MEDICINE

## 2023-11-02 PROCEDURE — 81002 URINALYSIS NONAUTO W/O SCOPE: CPT | Performed by: FAMILY MEDICINE

## 2023-11-02 PROCEDURE — 99214 OFFICE O/P EST MOD 30 MIN: CPT | Performed by: FAMILY MEDICINE

## 2023-11-02 PROCEDURE — 3008F BODY MASS INDEX DOCD: CPT | Performed by: FAMILY MEDICINE

## 2023-11-02 ASSESSMENT — ENCOUNTER SYMPTOMS
DEPRESSION: 0
LOSS OF SENSATION IN FEET: 0
OCCASIONAL FEELINGS OF UNSTEADINESS: 0

## 2023-11-04 PROBLEM — R10.30 LOWER ABDOMINAL PAIN: Status: ACTIVE | Noted: 2023-11-04

## 2023-11-04 ASSESSMENT — ENCOUNTER SYMPTOMS
CHILLS: 0
ABDOMINAL PAIN: 1
VOMITING: 0
RESPIRATORY NEGATIVE: 1
HEMATOLOGIC/LYMPHATIC NEGATIVE: 1
CARDIOVASCULAR NEGATIVE: 1
FEVER: 0
PSYCHIATRIC NEGATIVE: 1
ENDOCRINE NEGATIVE: 1
ALLERGIC/IMMUNOLOGIC NEGATIVE: 1
NEUROLOGICAL NEGATIVE: 1
NAUSEA: 0
MUSCULOSKELETAL NEGATIVE: 1

## 2023-11-05 NOTE — ASSESSMENT & PLAN NOTE
Recent test reviewed including CT scan colonoscopy which were fairly normal has history of bilateral oophorectomy discussed possibility of adhesions we will continue to eat soft diet Will refer to general surgery for evaluation of her lesions if pain continues call back if there is nausea or vomiting blood in stool fever

## 2023-11-05 NOTE — PROGRESS NOTES
Subjective   Patient ID: Ayanna Willard is a 48 y.o. female who presents for Abdominal Pain (Sharp stabbing pains).      No fever no chills no nausea no vomiting no relationship of pain to eating bowel movements no blood in stool no dysuria  Would like referral to bariatric surgery has been trying to lose weight on her own eating healthier staying active    Abdominal Pain  Pertinent negatives include no fever, nausea or vomiting.    patient complains of continued pain left lower quadrant had a colonoscopy which was fairly normal has had this pain prior to that had a CAT scan earlier this year which showed thickening of the cecum cecum appeared normal on colonoscopy states she has sharp pain has had bilateral nephrectomy  UA was done today was negative  Current Outpatient Medications on File Prior to Visit   Medication Sig Dispense Refill    cyanocobalamin (Vitamin B-12) 1,000 mcg/mL injection INJECT 1 ML (1,000 MCG) INTO THE SHOULDER, THIGH, OR BUTTOCKS EVERY 30 (THIRTY) DAYS. 1 mL 5    cyclobenzaprine (Flexeril) 5 mg tablet Take 1 tablet (5 mg) by mouth 3 times a day as needed for muscle spasms. 1-2 TABLETS PRN 30 tablet 1    ergocalciferol (Vitamin D-2) 1.25 MG (14042 UT) capsule Take 1 capsule (50,000 Units) by mouth 1 (one) time per week. 13 capsule 1    GaviLyte-G 236-22.74-6.74 -5.86 gram solution PLEASE SEE ATTACHED FOR DETAILED DIRECTIONS      ibuprofen 800 mg tablet Take 1 tablet (800 mg) by mouth. W FOOD FOR 10 DAYS      metoprolol succinate XL (Toprol-XL) 25 mg 24 hr tablet Take 0.5 tablets (12.5 mg) by mouth once daily.      pantoprazole (ProtoNix) 40 mg EC tablet Take 1 tablet (40 mg) by mouth once daily. Do not crush, chew, or split. 90 tablet 1     No current facility-administered medications on file prior to visit.        Review of Systems   Constitutional:  Negative for chills and fever.   HENT: Negative.     Respiratory: Negative.     Cardiovascular: Negative.    Gastrointestinal:  Positive for  "abdominal pain. Negative for nausea and vomiting.   Endocrine: Negative.    Genitourinary: Negative.    Musculoskeletal: Negative.    Skin: Negative.  Negative for rash.   Allergic/Immunologic: Negative.    Neurological: Negative.    Hematological: Negative.    Psychiatric/Behavioral: Negative.     All other systems reviewed and are negative.      Objective   Blood Pressure 114/79   Pulse 71   Height 1.651 m (5' 5\")   Weight 104 kg (228 lb 12.8 oz)   Body Mass Index 38.07 kg/m²   BSA: 2.18 meters squared  Growth percentiles: Facility age limit for growth %dima is 20 years. Facility age limit for growth %dima is 20 years.   Office Visit on 11/02/2023   Component Date Value Ref Range Status    POC Color, Urine 11/02/2023 Yellow  Straw, Yellow, Light-Yellow Final    POC Appearance, Urine 11/02/2023 Clear  Clear Final    POC Specific Gravity, Urine 11/02/2023 1.015  1.005 - 1.035 Final    POC PH, Urine 11/02/2023 6.0  No Reference Range Established PH Final    POC Protein, Urine 11/02/2023 NEGATIVE  NEGATIVE, 30 (1+) mg/dl Final    POC Glucose, Urine 11/02/2023 NEGATIVE  NEGATIVE mg/dl Final    POC Blood, Urine 11/02/2023 NEGATIVE  NEGATIVE Final    POC Ketones, Urine 11/02/2023 NEGATIVE  NEGATIVE mg/dl Final    POC Bilirubin, Urine 11/02/2023 NEGATIVE  NEGATIVE Final    POC Urobilinogen, Urine 11/02/2023 0.2  0.2, 1.0 EU/DL Final    Poc Nitrate, Urine 11/02/2023 NEGATIVE  NEGATIVE Final    POC Leukocytes, Urine 11/02/2023 NEGATIVE  NEGATIVE Final      Physical Exam  Constitutional:       Appearance: She is obese.   HENT:      Head: Normocephalic.      Mouth/Throat:      Mouth: Mucous membranes are moist.   Eyes:      Pupils: Pupils are equal, round, and reactive to light.   Abdominal:      General: There is no distension.      Palpations: There is no mass.      Tenderness: There is abdominal tenderness. There is no right CVA tenderness, guarding or rebound.      Hernia: No hernia is present.   Neurological:      " Mental Status: She is alert.         Assessment/Plan   Problem List Items Addressed This Visit             ICD-10-CM    Abdominal pain, LLQ (left lower quadrant) R10.32     Recent test reviewed including CT scan colonoscopy which were fairly normal has history of bilateral oophorectomy discussed possibility of adhesions we will continue to eat soft diet Will refer to general surgery for evaluation of her lesions if pain continues call back if there is nausea or vomiting blood in stool fever         Obesity - Primary E66.9    Relevant Orders    Referral to Bariatric Surgery    Lower abdominal pain R10.30    Relevant Orders    POCT UA (nonautomated) manually resulted (Completed)

## 2023-11-16 ENCOUNTER — DOCUMENTATION (OUTPATIENT)
Dept: SURGERY | Facility: HOSPITAL | Age: 48
End: 2023-11-16
Payer: COMMERCIAL

## 2023-11-16 NOTE — PROGRESS NOTES
Recvd referral, called the patient to schedule. The patient selected [facility] Email address verified and email with appt confirmation and np packet was sent.

## 2023-11-23 DIAGNOSIS — I48.91 UNSPECIFIED ATRIAL FIBRILLATION (MULTI): Primary | ICD-10-CM

## 2023-11-27 RX ORDER — METOPROLOL SUCCINATE 25 MG/1
12.5 TABLET, EXTENDED RELEASE ORAL DAILY
Qty: 45 TABLET | Refills: 3 | Status: SHIPPED | OUTPATIENT
Start: 2023-11-27 | End: 2024-01-17 | Stop reason: SDUPTHER

## 2024-01-03 ENCOUNTER — APPOINTMENT (OUTPATIENT)
Dept: CARDIOLOGY | Facility: HOSPITAL | Age: 49
End: 2024-01-03
Payer: COMMERCIAL

## 2024-01-08 NOTE — PROGRESS NOTES
Subjective     Date: 1/8/2024 Time: 8:49 AM  Name: Ayanna Willard  MRN: [id number]    This is a 48 y.o. female with morbid obesity (Body mass index is 37.94 kg/m².) who presents to clinic for consideration of bariatric surgery. she has attempted and failed multiple diet and exercise regimens for weight loss. Initial Onset of obesity was  after she stopped smoking in 2009 .  Their goal for surgery is to  be healthier . The patient has tried multiple diets to lose weight including  Slim Fast and cabbage soup . The most pounds lost on this diet were NA lbs. The patient considers their dietary weakness to be sweets The patient reports a  highest weight ever of 232 pounds pounds and lowest weight ever of 215 pounds pounds Distribution of Obesity: is general. Current diet:  regular . Compliance: General Adherence. The patient does not exercise (hours for work make exercise challenging).       Patient Active Problem List   Diagnosis    Abdominal pain, LLQ (left lower quadrant)    Abnormal urine findings    Afib (CMS/HCC)    Atypical chest pain    Bacterial vaginosis    Closed nondisplaced fracture of fourth metatarsal bone of right foot    Closed nondisplaced fracture of second metatarsal bone of right foot    Closed nondisplaced fracture of third metatarsal bone of right foot    Dense breast    Decreased range of motion of right ankle    Eczema    Fatigue    Fatty liver    Fibrocystic breast    Fracture of foot    Gastroesophageal reflux disease    Gastroesophageal reflux disease with esophagitis    Genetic susceptibility to malignant neoplasm of breast    Herpes genitalia    Impaired fasting glucose    Impaired strength of lower extremity    Increased frequency of urination    Left wrist pain    Right wrist pain    Lower urinary tract infectious disease    Lumbar sprain    Nasal congestion    Neck pain    Obesity    Obstructive sleep apnea syndrome    Other disorders of intestinal carbohydrate absorption    Polyphagia     Prehypertension    Right foot pain    Joint pain, foot    Right shoulder pain    S/P breast reconstruction    Sinusitis    Sore throat    Sprain of ankle    Sprain of ligament of tarsometatarsal joint, right, initial encounter    Tachycardia    Tenosynovitis of left wrist    Vaginitis    Vitamin D deficiency    Anemia    Familial cancer of breast (CMS/HCC)    Hypothyroidism    Vitamin B12 deficiency    LLQ pain    Lower abdominal pain     Preferred procedure: Gastric Bypass    0 = No symptoms GERD if taking Protonix. Patient states not often and symptoms are mild.     PMH:   Past Medical History:   Diagnosis Date    Encounter for screening for malignant neoplasm of cervix 2016    Pap smear for cervical cancer screening    History of bilateral mastectomy 2022    Other specified health status     Medical history non-contributory        PSH:   Past Surgical History:   Procedure Laterality Date     SECTION, CLASSIC  2016     Section    MR PELVIS ANGIO W IV CONTRAST  2016    MR PELVIS ANGIO W IV CONTRAST 2016 [facility]    OTHER SURGICAL HISTORY  2016    Breast Reconstruction With Tissue Expander Bilateral    OTHER SURGICAL HISTORY  2016    Simple Mastectomy Bilateral    OTHER SURGICAL HISTORY  2016    Oophorectomy - Bilat (Removal Of Both Ovaries) Laparoscopic    OTHER SURGICAL HISTORY  2016    Breast Surgery Reconstruction With Free Flap       FAMILY HISTORY:  Family History   Problem Relation Name Age of Onset    Colon cancer Mother      Breast cancer Mother      Breast cancer Sister      Breast cancer Mother's Sister      Colon cancer Mother's Brother      Colon cancer Maternal Grandmother      Other (C SECTION) Other FAM HX     Oophorectomy Other FAM HX         SOCIAL HISTORY:  Social History     Tobacco Use    Smoking status: Never    Smokeless tobacco: Never   Substance Use Topics    Alcohol use: Never    Drug use: Never        MEDICATIONS:  Prior to Admission Medications:  Medication Documentation Review Audit       Reviewed by Alie Castellano MA (Medical Assistant) on 11/02/23 at 0839      Medication Order Taking? Sig Documenting Provider Last Dose Status   cyanocobalamin (Vitamin B-12) 1,000 mcg/mL injection [id number] Yes INJECT 1 ML (1,000 MCG) INTO THE SHOULDER, THIGH, OR BUTTOCKS EVERY 30 (THIRTY) DAYS. Macho Hicks MD Taking Active   cyclobenzaprine (Flexeril) 5 mg tablet [id number] Yes Take 1 tablet (5 mg) by mouth 3 times a day as needed for muscle spasms. 1-2 TABLETS PRN Macho Hicks MD Taking Active   ergocalciferol (Vitamin D-2) 1.25 MG (18282 UT) capsule [id number] Yes Take 1 capsule (50,000 Units) by mouth 1 (one) time per week. Macho Hicks MD Taking Active   GaviLyte-G 236-22.74-6.74 -5.86 gram solution [id number] Yes PLEASE SEE ATTACHED FOR DETAILED DIRECTIONS Historical Provider, MD Taking Active   ibuprofen 800 mg tablet [id number] Yes Take 1 tablet (800 mg) by mouth. W FOOD FOR 10 DAYS Historical Provider, MD Taking Active   metoprolol succinate XL (Toprol-XL) 25 mg 24 hr tablet [id number] Yes Take 0.5 tablets (12.5 mg) by mouth once daily. Historical Provider, MD Taking Active   pantoprazole (ProtoNix) 40 mg EC tablet [id number]  Take 1 tablet (40 mg) by mouth once daily. Do not crush, chew, or split. Macho Hicks MD  Active                     ALLERGIES:  No Known Allergies    REVIEW OF SYSTEMS:  GENERAL: Negative for malaise, significant weight loss and fever  HEAD: Negative for headache, swelling.  NECK: Negative for lumps, goiter, pain and significant neck swelling  RESPIRATORY: Negative for cough, wheezing or shortness of breath.  CARDIOVASCULAR: Negative for chest pain, leg swelling or palpitations.  GI: Negative for abdominal discomfort, blood in stools or black stools or change in bowel habits  : No history of dysuria, frequency or incontinence  MUSCULOSKELETAL: Negative for joint pain or swelling, back  pain or muscle pain.  SKIN: Negative for lesions, rash, and itching.  PSYCH: Negative for sleep disturbance, mood disorder and recent psychosocial stressors.  ENDOCRINE: Negative for cold or heat intolerance, polyuria, polydipsia and goiter.    Objective   PHYSICAL EXAM:  Visit Vitals  Smoking Status Never     Virtual visit , no exam done    Assessment/Plan   IMPRESSION:  Ayanna Willard is a 48 y.o. female with a BMI of There is no height or weight on file to calculate BMI. with the following diagnoses and co-morbidities:     Past Medical History:   Diagnosis Date    Encounter for screening for malignant neoplasm of cervix 02/05/2016    Pap smear for cervical cancer screening    History of bilateral mastectomy 11/30/2022    Other specified health status     Medical history non-contributory       This patient does meet the criteria for a surgical weight loss procedure according to NIH guidelines.  The risks of sleeve gastrectomy, Perry-en-Y gastric bypass surgery including but not limited to bleeding, leak along staple lines, infection, dehydration, ulcers, internal hernia, DVT/PE, pneumonia, myocardial infarction, prolonged nausea/vomiting, incomplete resolution of associated medical conditions, reflux, weight regain, vitamin/mineral deficiencies, and death have been explained to the patient and Ayanna Willard has expressed understanding and acceptance of them.     We discussed the lifestyle changes necessary to be successful following surgery.    She wants to proceed with SG.     The increased risk of substance and alcohol abuse following bariatric surgery was discussed with the patient, along with the negative consequences of substance/alcohol use after surgery including addiction, worsening of mental health disorders, and injury to the stomach. The risk of smoking and vaping (tobacco or any other substance) after bariatric surgery was explained to the patient. This includes risk of anastamotic ulcers, gastritis,  bleeding, perforation, stricture, and PO intolerance.  The patient expressed understanding and acceptance of these risks.    The patient was advised not to become pregnant within 12-18 months following bariatric surgery. She was educated on the increased risks to mother and fetus associated with pregnancy within 2 years of bariatric surgery.    The benefits of the above surgeries including weight loss, improvement/resolution of associated medical and mental health conditions, improved mobility, and decreased mortality have been explained the the patient and Ayanna Willard has expressed understanding and acceptance of them.      PLAN:  The plan of treatment for Ayanna Willard is to continue with the consultations and tests ordered today in hopes of qualifying for pre-operative clearance for bariatric surgery. This includes:    Consult Nutrition for education and MSWL  Consult Psychology  Consult Cardiology  Sleep medicine for CPAP compliance   Labs ordered  EGD  PCP for medical optimization  Consult sleep medicine - has DARBY, but is not currently using CPAP as she does not have all equipment.  Recommend at least 5 lbs of weight loss prior to surgery.

## 2024-01-09 ENCOUNTER — TELEMEDICINE (OUTPATIENT)
Dept: SURGERY | Facility: HOSPITAL | Age: 49
End: 2024-01-09
Payer: COMMERCIAL

## 2024-01-09 VITALS — BODY MASS INDEX: 37.94 KG/M2 | WEIGHT: 228 LBS

## 2024-01-09 DIAGNOSIS — G47.33 OSA (OBSTRUCTIVE SLEEP APNEA): ICD-10-CM

## 2024-01-09 DIAGNOSIS — K21.9 GASTROESOPHAGEAL REFLUX DISEASE, UNSPECIFIED WHETHER ESOPHAGITIS PRESENT: ICD-10-CM

## 2024-01-09 DIAGNOSIS — Z98.84 BARIATRIC SURGERY STATUS: ICD-10-CM

## 2024-01-09 DIAGNOSIS — E53.8 VITAMIN B12 DEFICIENCY: ICD-10-CM

## 2024-01-09 DIAGNOSIS — E55.9 VITAMIN D DEFICIENCY: ICD-10-CM

## 2024-01-09 DIAGNOSIS — E66.9 OBESITY, CLASS II, BMI 35-39.9: Primary | ICD-10-CM

## 2024-01-09 PROCEDURE — 99213 OFFICE O/P EST LOW 20 MIN: CPT | Mod: 95 | Performed by: SURGERY

## 2024-01-09 PROCEDURE — 99203 OFFICE O/P NEW LOW 30 MIN: CPT | Performed by: SURGERY

## 2024-01-10 DIAGNOSIS — Z98.84 BARIATRIC SURGERY STATUS: ICD-10-CM

## 2024-01-16 NOTE — PROGRESS NOTES
Surgeon: Yosef  Patient is considering:  Sleeve Gastrectomy    ASSESSMENT:  Current weight:  233 lbs   Ht: 65  in   BMI: 38.9       Initial start weight: 233 lbs   Pre-Op Excess Body Weight (EBW):   83 lbs  Target Post-Op weight goal:  179 - 192 lbs    Food allergies/intolerances:  NKFA  Chewing/Swallowing/Dentition: none    Nausea / Vomiting / Hx Gastroparesis:  none  Diarrhea/ Constipation: none   Exercise level:  none   Smoking/Tobacco use:  none  Vitamins/Minerals supplements:  vitamin D weekly, B12 monthly, vitamin E, probiotic daily   Medications:   see list  Past diet attempts:   Low Carb, trulicity - down to 215#   Hours of sleep/night: 6  Occupation: 5 days/week 3-11 PM    24 HOUR RECALL/DIET HISTORY:  Breakfast:  none  Snack:    Lunch: spaghetti  Snack:   Dinner: spaghetti  Snack: banana  Beverages: 16oz pop, coffee 6oz coffee w/ creamer, 8oz juice, 24oz water, silk almond milk   Alcohol: 1 drink/week     Person responsible for cooking & shopping? Self; frequency: does bulk shopping 1 month - [employer] and [employer]   How often do you eat sweet snacks?  Daily - often at work   How often do you eat savory snacks?  daily  How often do you eat out?  Once/week - cafeteria, restaurants  Do you feel overly stuffed? Denies  Binge Eating? Denies  Night Eating?  Denies  Emotional Eating?  Boredom, opts for sweet, occurs 2x/week       READINESS TO LEARN:  Motivation to learn: Interested        Understanding of instruction: Good   Anticipated Compliance: Good   Family Support: Unable to assess-family not present          Educational Materials Provided:    High Protein Snack List  High Protein Drink  Schedules for support group   1500 calorie meal plan   Goals sheet    Patient's weight is self-reported. Patient will scheduled to attend a Virtual Education Class to review the 2 week Pre-op diet, Post op fluid, protein, vitamin/mineral supplementation, exercise goals and post op diet progression.    Patient presents  "with excessive calorie obesity seeking  sleeve gastrectomy.    Patient seen today to complete nutrition evaluation for weight loss surgery. Pt states she was 145# in 2009, quit smoking and has started gaining weight and now at her heaviest weight. Las tried low carb and trulicity to help with weight loss. Patient identifies snacking, lack of exercise, and sugary beverages to contribute to inability to lose weight.   Recommended to use 1500 calorie meal plan and begin to eat 3 meals/day, avoid skipping meals. Reviewed fluids to eliminate and replace with SF, non-carbonated, caffeine free fluids. Reviewed postop behaviors and encouraged to begin practicing. Recommended to start daily MVI. Recommended to begin exercising  for 3 days/week for 20 minutes. Discussed identifying triggers for boredom eating or eating when not hungry. Encouraged to use \"Action List\" and Stop and think method to identify hunger vs. Not hungry. Encouraged patient to not sit at dining room table when watching tv as this could subconsciously trigger wanting to eat.     Patient was receptive to nutritional recommendations, asked numerous questions, and verbalized understanding of the weight loss surgery diet.  Patient expressed understanding about the importance of strict dietary compliance post-surgery to avoid nutritional deficiencies and achieve optimal weight loss and verbalized intent to follow dietary recommendations.      Malnutrition Screening:  Significant unintentional weight loss? No  Eating less than 75% of usual intake for more than 2 weeks? No      Nutrition Diagnosis:   1. Overweight/obesity related to excess energy intake as evidenced by BMI = 40 kg/m^2.  2. Food- and nutrition-related knowledge deficit related to lack of prior exposure to surgical weight loss information as evidenced by pt new to surgical program.    Nutrition Interventions:   1. Modify type and amount of food and nutrients within meals and " "snacks.  2. Comprehensive Nutrition Education    Recommendations:  1. Follow your 1500  calorie meal plan. Use the blank food logs provided to help you balance each meal with each food groups.   2. Structure meal patterns, eating three meals and 1 snack/day.   3. Use the \"Stop and Think\" Method to help identify head hunger vs physical hunger. Keep an action list nearby to give you pre-thought out list of things to do to avoid eating when not hungry.   4. Drink 64oz of calorie-free, caffeine-free, and non-carbonated beverages. Eliminate pop and juice,  and start to wean off coffee  5. Practice no drinking with meals and take smaller sips of fluid. Avoid chugging/gulping.  6. Begin daily multivitamin.  Flintstones Complete has everything you need.  7. Begin exercising 3 days/week for 20 minutes.   8. We will follow up with your progress on Friday, February 23rd at 1:00 PM. You will need to weigh yourself before this appointment and provide a 24 hour food recall.    Pre-op Goal weight: lose 5% of body weight    Nutrition Monitoring and Evaluation: 1-2 pound weight loss per week  Criteria: weight check  Need for Follow-up:     Patient does meet National Institutes Health guidelines for weight loss surgery, however needs to demonstrate consistent effort in making dietary changes before giving clearance. It is anticipated that the patient will need at least  1   nutritional follow-up visits prior to clearance for surgery.    "

## 2024-01-17 ENCOUNTER — OFFICE VISIT (OUTPATIENT)
Dept: CARDIOLOGY | Facility: HOSPITAL | Age: 49
End: 2024-01-17
Payer: COMMERCIAL

## 2024-01-17 VITALS
HEART RATE: 81 BPM | WEIGHT: 233.8 LBS | SYSTOLIC BLOOD PRESSURE: 116 MMHG | OXYGEN SATURATION: 95 % | HEIGHT: 65 IN | BODY MASS INDEX: 38.95 KG/M2 | RESPIRATION RATE: 18 BRPM | DIASTOLIC BLOOD PRESSURE: 83 MMHG

## 2024-01-17 DIAGNOSIS — I48.91 UNSPECIFIED ATRIAL FIBRILLATION (MULTI): ICD-10-CM

## 2024-01-17 DIAGNOSIS — I48.21 PERMANENT ATRIAL FIBRILLATION (MULTI): Primary | ICD-10-CM

## 2024-01-17 PROCEDURE — 99214 OFFICE O/P EST MOD 30 MIN: CPT | Performed by: INTERNAL MEDICINE

## 2024-01-17 PROCEDURE — 1036F TOBACCO NON-USER: CPT | Performed by: INTERNAL MEDICINE

## 2024-01-17 PROCEDURE — 3008F BODY MASS INDEX DOCD: CPT | Performed by: INTERNAL MEDICINE

## 2024-01-17 PROCEDURE — 93005 ELECTROCARDIOGRAM TRACING: CPT | Performed by: INTERNAL MEDICINE

## 2024-01-17 PROCEDURE — 93010 ELECTROCARDIOGRAM REPORT: CPT | Performed by: INTERNAL MEDICINE

## 2024-01-17 RX ORDER — METOPROLOL SUCCINATE 25 MG/1
25 TABLET, EXTENDED RELEASE ORAL DAILY
Qty: 90 TABLET | Refills: 3 | Status: SHIPPED | OUTPATIENT
Start: 2024-01-17 | End: 2025-01-16

## 2024-01-17 ASSESSMENT — ENCOUNTER SYMPTOMS
OCCASIONAL FEELINGS OF UNSTEADINESS: 0
LOSS OF SENSATION IN FEET: 0
DEPRESSION: 0

## 2024-01-17 NOTE — PROGRESS NOTES
"Chief Complaint:   Follow-up (A fib)     History Of Present Illness:    Ayanna Willard is a 48 y.o. female here for followup. Has a history of DARBY and obesity and known lone paroxysmal atrial fibrillation with rates ranging between low 100's to 120's off therapy. She was historically intolerant of Toprol at higher doses but has been able to take her current 12.5 mg dose which she has been compliant with.      She reports doing well. Has not noted any episodes of atrial fibrillation per her SmartWatch but has noted some elevated HR's above 100 bpm or so from time to time. She is interested in going back to a higher dose of her Toprol.   Denies other cardiovascular complaints. She is planning on getting bariatric surgery.               Last Recorded Vitals:  Vitals:    01/17/24 1151   BP: 116/83   BP Location: Left arm   Patient Position: Sitting   BP Cuff Size: Adult   Pulse: 81   Resp: 18   SpO2: 95%   Weight: 106 kg (233 lb 12.8 oz)   Height: 1.651 m (5' 5\")             Allergies:  Patient has no known allergies.    Outpatient Medications:  Current Outpatient Medications   Medication Instructions    cyanocobalamin (VITAMIN B-12) 1,000 mcg, intramuscular, Every 30 days    cyclobenzaprine (FLEXERIL) 5 mg, oral, 3 times daily PRN, 1-2 TABLETS PRN    ergocalciferol (VITAMIN D-2) 50,000 Units, oral, Weekly    ibuprofen 800 mg, oral, W FOOD FOR 10 DAYS    metoprolol succinate XL (TOPROL-XL) 12.5 mg, oral, Daily    pantoprazole (PROTONIX) 40 mg, oral, Daily, Do not crush, chew, or split.         Physical Exam:  Gen Well appearing adult female sitting up in NAD. Body mass index is 38.91 kg/m².   CV rrr. No m/r/g appreciated. No JVD or leg edema. Pulses 2+ and symmetric.    Pulm Lungs clear with normal respiratory effort.  Neuro Alert and conversant. Grossly nonfocal.       I reviewed the patient's ECG -  NSR. IVCD.    I reviewed most recent imaging / labs / and office notes as well as details of any recent hospitalizations " or ED visits.    Assessment/Plan   1. Atrial fibrillation  Paroxysmal. Tolerating current Toprol dose. Low [id number] score--her risk / benefit profile favors no anticoagulation at this time. This will change should she develop CHF, HTN, diabetes, etc or when she gets to age 66. Exercise and weight loss encouraged. Ablation to be considered if her atrial fibrillation burden is to increase. Increasing her Toprol dose to a full tablet per patient preference.    2. Preop CV risk assessment  She is pending bariatric surgery. She is considered a low risk for perioperative cardiovascular events. No further cardiac testing recommended. No cardiac barriers to surgery.         Followup 1 year        Martinez Garg MD

## 2024-01-20 LAB
ATRIAL RATE: 81 BPM
P AXIS: 38 DEGREES
P OFFSET: 184 MS
P ONSET: 133 MS
PR INTERVAL: 150 MS
Q ONSET: 208 MS
QRS COUNT: 13 BEATS
QRS DURATION: 126 MS
QT INTERVAL: 362 MS
QTC CALCULATION(BAZETT): 420 MS
QTC FREDERICIA: 400 MS
R AXIS: 64 DEGREES
T AXIS: 12 DEGREES
T OFFSET: 389 MS
VENTRICULAR RATE: 81 BPM

## 2024-01-22 ENCOUNTER — NUTRITION (OUTPATIENT)
Dept: SURGERY | Facility: HOSPITAL | Age: 49
End: 2024-01-22
Payer: COMMERCIAL

## 2024-01-22 DIAGNOSIS — Z98.84 BARIATRIC SURGERY STATUS: Primary | ICD-10-CM

## 2024-01-22 DIAGNOSIS — E66.9 OBESITY, CLASS II, BMI 35-39.9: ICD-10-CM

## 2024-01-26 DIAGNOSIS — E53.8 VITAMIN B12 DEFICIENCY: ICD-10-CM

## 2024-01-26 RX ORDER — CYANOCOBALAMIN 1000 UG/ML
1000 INJECTION, SOLUTION INTRAMUSCULAR; SUBCUTANEOUS
Qty: 3 ML | Refills: 2 | Status: SHIPPED | OUTPATIENT
Start: 2024-01-26 | End: 2024-04-01 | Stop reason: SDUPTHER

## 2024-02-22 ENCOUNTER — LAB (OUTPATIENT)
Dept: LAB | Facility: LAB | Age: 49
End: 2024-02-22
Payer: COMMERCIAL

## 2024-02-22 DIAGNOSIS — Z98.84 BARIATRIC SURGERY STATUS: ICD-10-CM

## 2024-02-22 LAB
25(OH)D3 SERPL-MCNC: 23 NG/ML (ref 30–100)
ALBUMIN SERPL BCP-MCNC: 4.3 G/DL (ref 3.4–5)
ALP SERPL-CCNC: 71 U/L (ref 33–110)
ALT SERPL W P-5'-P-CCNC: 44 U/L (ref 7–45)
AMPHETAMINES UR QL SCN: NORMAL
ANION GAP SERPL CALC-SCNC: 13 MMOL/L (ref 10–20)
APTT PPP: 32 SECONDS (ref 27–38)
AST SERPL W P-5'-P-CCNC: 23 U/L (ref 9–39)
BARBITURATES UR QL SCN: NORMAL
BASOPHILS # BLD AUTO: 0.03 X10*3/UL (ref 0–0.1)
BASOPHILS NFR BLD AUTO: 0.5 %
BENZODIAZ UR QL SCN: NORMAL
BILIRUB SERPL-MCNC: 0.5 MG/DL (ref 0–1.2)
BUN SERPL-MCNC: 15 MG/DL (ref 6–23)
BZE UR QL SCN: NORMAL
CALCIUM SERPL-MCNC: 9.6 MG/DL (ref 8.6–10.3)
CANNABINOIDS UR QL SCN: NORMAL
CHLORIDE SERPL-SCNC: 105 MMOL/L (ref 98–107)
CHOLEST SERPL-MCNC: 174 MG/DL (ref 0–199)
CHOLESTEROL/HDL RATIO: 3.6
CO2 SERPL-SCNC: 29 MMOL/L (ref 21–32)
CREAT SERPL-MCNC: 0.83 MG/DL (ref 0.5–1.05)
EGFRCR SERPLBLD CKD-EPI 2021: 88 ML/MIN/1.73M*2
EOSINOPHIL # BLD AUTO: 0.06 X10*3/UL (ref 0–0.7)
EOSINOPHIL NFR BLD AUTO: 1 %
ERYTHROCYTE [DISTWIDTH] IN BLOOD BY AUTOMATED COUNT: 13.8 % (ref 11.5–14.5)
EST. AVERAGE GLUCOSE BLD GHB EST-MCNC: 114 MG/DL
FENTANYL+NORFENTANYL UR QL SCN: NORMAL
FERRITIN SERPL-MCNC: 231 NG/ML (ref 8–150)
FOLATE SERPL-MCNC: 17.1 NG/ML
GLUCOSE SERPL-MCNC: 83 MG/DL (ref 74–99)
HBA1C MFR BLD: 5.6 %
HCT VFR BLD AUTO: 43.3 % (ref 36–46)
HDLC SERPL-MCNC: 47.9 MG/DL
HGB BLD-MCNC: 13.6 G/DL (ref 12–16)
IMM GRANULOCYTES # BLD AUTO: 0.03 X10*3/UL (ref 0–0.7)
IMM GRANULOCYTES NFR BLD AUTO: 0.5 % (ref 0–0.9)
INR PPP: 1 (ref 0.9–1.1)
IRON SATN MFR SERPL: 32 % (ref 25–45)
IRON SERPL-MCNC: 104 UG/DL (ref 35–150)
LDLC SERPL CALC-MCNC: 114 MG/DL
LYMPHOCYTES # BLD AUTO: 2.72 X10*3/UL (ref 1.2–4.8)
LYMPHOCYTES NFR BLD AUTO: 46.3 %
MCH RBC QN AUTO: 26.8 PG (ref 26–34)
MCHC RBC AUTO-ENTMCNC: 31.4 G/DL (ref 32–36)
MCV RBC AUTO: 85 FL (ref 80–100)
MONOCYTES # BLD AUTO: 0.48 X10*3/UL (ref 0.1–1)
MONOCYTES NFR BLD AUTO: 8.2 %
NEUTROPHILS # BLD AUTO: 2.56 X10*3/UL (ref 1.2–7.7)
NEUTROPHILS NFR BLD AUTO: 43.5 %
NON HDL CHOLESTEROL: 126 MG/DL (ref 0–149)
NRBC BLD-RTO: 0 /100 WBCS (ref 0–0)
OPIATES UR QL SCN: NORMAL
OXYCODONE+OXYMORPHONE UR QL SCN: NORMAL
PCP UR QL SCN: NORMAL
PLATELET # BLD AUTO: 281 X10*3/UL (ref 150–450)
POTASSIUM SERPL-SCNC: 3.7 MMOL/L (ref 3.5–5.3)
PROT SERPL-MCNC: 6.9 G/DL (ref 6.4–8.2)
PROTHROMBIN TIME: 11.4 SECONDS (ref 9.8–12.8)
PTH-INTACT SERPL-MCNC: 64.5 PG/ML (ref 18.5–88)
RBC # BLD AUTO: 5.08 X10*6/UL (ref 4–5.2)
SODIUM SERPL-SCNC: 143 MMOL/L (ref 136–145)
T4 FREE SERPL-MCNC: 0.94 NG/DL (ref 0.61–1.12)
TIBC SERPL-MCNC: 328 UG/DL (ref 240–445)
TRIGL SERPL-MCNC: 63 MG/DL (ref 0–149)
TSH SERPL-ACNC: 0.74 MIU/L (ref 0.44–3.98)
UIBC SERPL-MCNC: 224 UG/DL (ref 110–370)
VIT B12 SERPL-MCNC: 285 PG/ML (ref 211–911)
VLDL: 13 MG/DL (ref 0–40)
WBC # BLD AUTO: 5.9 X10*3/UL (ref 4.4–11.3)

## 2024-02-22 PROCEDURE — 85610 PROTHROMBIN TIME: CPT

## 2024-02-22 PROCEDURE — 85730 THROMBOPLASTIN TIME PARTIAL: CPT

## 2024-02-22 PROCEDURE — 82607 VITAMIN B-12: CPT

## 2024-02-22 PROCEDURE — 83540 ASSAY OF IRON: CPT

## 2024-02-22 PROCEDURE — 84439 ASSAY OF FREE THYROXINE: CPT

## 2024-02-22 PROCEDURE — 84443 ASSAY THYROID STIM HORMONE: CPT

## 2024-02-22 PROCEDURE — 82746 ASSAY OF FOLIC ACID SERUM: CPT

## 2024-02-22 PROCEDURE — 82306 VITAMIN D 25 HYDROXY: CPT

## 2024-02-22 PROCEDURE — 82525 ASSAY OF COPPER: CPT

## 2024-02-22 PROCEDURE — 80323 ALKALOIDS NOS: CPT

## 2024-02-22 PROCEDURE — 84425 ASSAY OF VITAMIN B-1: CPT

## 2024-02-22 PROCEDURE — 84630 ASSAY OF ZINC: CPT

## 2024-02-22 PROCEDURE — 85025 COMPLETE CBC W/AUTO DIFF WBC: CPT

## 2024-02-22 PROCEDURE — 83970 ASSAY OF PARATHORMONE: CPT

## 2024-02-22 PROCEDURE — 83036 HEMOGLOBIN GLYCOSYLATED A1C: CPT

## 2024-02-22 PROCEDURE — 80053 COMPREHEN METABOLIC PANEL: CPT

## 2024-02-22 PROCEDURE — 82728 ASSAY OF FERRITIN: CPT

## 2024-02-22 PROCEDURE — 83550 IRON BINDING TEST: CPT

## 2024-02-22 PROCEDURE — 80061 LIPID PANEL: CPT

## 2024-02-22 PROCEDURE — 83013 H PYLORI (C-13) BREATH: CPT

## 2024-02-22 PROCEDURE — 36415 COLL VENOUS BLD VENIPUNCTURE: CPT

## 2024-02-22 PROCEDURE — 80307 DRUG TEST PRSMV CHEM ANLYZR: CPT

## 2024-02-23 ENCOUNTER — NUTRITION (OUTPATIENT)
Dept: SURGERY | Facility: CLINIC | Age: 49
End: 2024-02-23
Payer: COMMERCIAL

## 2024-02-23 VITALS — BODY MASS INDEX: 37.77 KG/M2 | WEIGHT: 227 LBS

## 2024-02-23 DIAGNOSIS — E66.9 OBESITY, CLASS II, BMI 35-39.9: Primary | ICD-10-CM

## 2024-02-23 DIAGNOSIS — Z98.84 BARIATRIC SURGERY STATUS: ICD-10-CM

## 2024-02-23 LAB — UREA BREATH TEST QL: NEGATIVE

## 2024-02-23 NOTE — PROGRESS NOTES
"PROGRESS:  Recommendations (1/22/24)  1.          Follow your 1500  calorie meal plan. Use the blank food logs provided to help you balance each meal with each food groups.   2.          Structure meal patterns, eating three meals and 1 snack/day.   3.          Use the \"Stop and Think\" Method to help identify head hunger vs physical hunger. Keep an action list nearby to give you pre-thought out list of things to do to avoid eating when not hungry.   4.          Drink 64oz of calorie-free, caffeine-free, and non-carbonated beverages. Eliminate pop and juice,  and start to wean off coffee  5.          Practice no drinking with meals and take smaller sips of fluid. Avoid chugging/gulping.  6.          Begin daily multivitamin.  Flintstones Complete has everything you need.  7.          Begin exercising 3 days/week for 20 minutes.   8.          We will follow up with your progress on Friday, February 23rd at 1:00 PM. You will need to weigh yourself before this appointment and provide a 24 hour food recall.         Patient Meeting Goals: MET         ASSESSMENT:  Current weight:   227 lbs    Ht: 65 in   BMI: 37.7  Previous weight: 233 lbs        Initial start weight:  233 lbs      24 HOUR RECALL/DIET HISTORY:  Breakfast:  omelet (x2 eggs, cheese, turkey, tomatoes)  Snack:    Lunch: bowl of cereal   Snack:   Dinner: salad from chipotle w/ chix, sour cream, cheese, black bean, dressing, lettuce  Snack: turkey on arina zoya wheat bread   Beverages: 64 oz water, cranberry juice   Alcohol: yes during vacation      Exercise: elliptical 30 minutes 3 days/week   Vitamins/minerals:  vitamin D weekly, B12 monthly, vitamin E, probiotic daily , MVI            READINESS TO LEARN:  Motivation to learn: Interested        Understanding of instruction: Good    Anticipated Compliance: Good      Family Support: Unable to assess-family not present          Educational Materials Provided:  none         Patient presents with excessive calorie " obesity seeking  sleeve gastrectomy.    Pt seen today to determine nutrition clearance for weight loss surgery. Patient's weight is self reported. Patient has lost 5 lbs since IV. Pt reports making some progress with eating 3 meals/day. Still can be challenging to eat breakfast, but trying to be more consistent. Meals are balanced with protein foods. Reports less sweet snacks. Reports drinking more water, still needs to get to 64oz goals. No pop, still drinking coffee and some juice. Recommended to try diet cranberry juice. Reports practicing postop behaviors. Patient has started exercising.  Less boredom eating, instead she plays with her dog.     Recommended to increase goal for exercise to either 4 days/week for 45 minutes 3 days/week. Patient encouraged to increase fluid goal to 64oz/day, wean off coffee and switch to diet juice. Patient has demonstrated appropriate lifestyle and diet modification to prepare her for surgery. Patient is cleared from nutrition standpoint at this time.         Malnutrition Screening:  Significant unintentional weight loss? No  Eating less than 75% of usual intake for more than 2 weeks? No      Nutrition Diagnosis:   1. Overweight/obesity related to excess energy intake as evidenced by BMI = 40 kg/m^2.  2. Food- and nutrition-related knowledge deficit related to lack of prior exposure to surgical weight loss information as evidenced by pt new to surgical program.    Nutrition Interventions:   1. Modify type and amount of food and nutrients within meals and snacks.  2. Comprehensive Nutrition Education    Recommendations:  1. Continue to eat 3 meals/day, 1-2 snacks as needed.  2. Have a good source of protein at every meal & snack.  Aim for 3-4 ounces per meal (20-30 grams).  3. Drink 64oz water daily. Switch to diet juice and wean off coffee.   4. Practice no drinking 30 minutes before meals, nothing with meals and wait 30 minutes after meals to drink again. Make meals last 30  minutes-chew thoroughly.   5. Continue daily multivitamin. Talk to your PCP about increasing your vitamin D to 5000 international units daily.   6. Increase physical activity to 45 minutes 3 days/week OR 30 minutes 4 days/week.  7. Attend the Virtual New Patient Class on Thursday, April 4th  at 12:30 PM. I will email you the ZOOM link on Monday, April 1st.     Pre-op Goal weight: lose 5% of body weight    Nutrition Monitoring and Evaluation: 1-2 pound weight loss per week  Criteria: weight check  Need for Follow-up: 2 months

## 2024-02-25 LAB
COPPER SERPL-MCNC: 104.2 UG/DL (ref 80–155)
ZINC SERPL-MCNC: 77.1 UG/DL (ref 60–120)

## 2024-02-26 LAB — VIT B1 PYROPHOSHATE BLD-SCNC: 75 NMOL/L (ref 70–180)

## 2024-02-27 ENCOUNTER — PATIENT MESSAGE (OUTPATIENT)
Dept: SURGERY | Facility: CLINIC | Age: 49
End: 2024-02-27
Payer: COMMERCIAL

## 2024-02-27 ENCOUNTER — APPOINTMENT (OUTPATIENT)
Dept: GASTROENTEROLOGY | Facility: HOSPITAL | Age: 49
End: 2024-02-27
Payer: COMMERCIAL

## 2024-02-27 LAB
COTININE SERPL-MCNC: <5 NG/ML
NICOTINE SERPL-MCNC: <5 NG/ML

## 2024-03-05 ENCOUNTER — OFFICE VISIT (OUTPATIENT)
Dept: BEHAVIORAL HEALTH | Facility: CLINIC | Age: 49
End: 2024-03-05
Payer: COMMERCIAL

## 2024-03-05 VITALS — HEIGHT: 65 IN | WEIGHT: 236.2 LBS | BODY MASS INDEX: 39.35 KG/M2

## 2024-03-05 DIAGNOSIS — E66.01 CLASS 2 SEVERE OBESITY DUE TO EXCESS CALORIES WITH SERIOUS COMORBIDITY AND BODY MASS INDEX (BMI) OF 39.0 TO 39.9 IN ADULT (MULTI): ICD-10-CM

## 2024-03-05 DIAGNOSIS — Z01.818 PRE-OPERATIVE CLEARANCE: ICD-10-CM

## 2024-03-05 DIAGNOSIS — Z98.84 BARIATRIC SURGERY STATUS: ICD-10-CM

## 2024-03-05 DIAGNOSIS — Z72.4 PROBLEMS RELATED TO INAPPROPRIATE DIET AND EATING HABITS: ICD-10-CM

## 2024-03-05 PROCEDURE — 1036F TOBACCO NON-USER: CPT | Performed by: PSYCHOLOGIST

## 2024-03-05 PROCEDURE — 3008F BODY MASS INDEX DOCD: CPT | Performed by: PSYCHOLOGIST

## 2024-03-05 PROCEDURE — 96136 PSYCL/NRPSYC TST PHY/QHP 1ST: CPT | Performed by: PSYCHOLOGIST

## 2024-03-05 PROCEDURE — 96130 PSYCL TST EVAL PHYS/QHP 1ST: CPT | Performed by: PSYCHOLOGIST

## 2024-03-05 PROCEDURE — 90791 PSYCH DIAGNOSTIC EVALUATION: CPT | Performed by: PSYCHOLOGIST

## 2024-03-05 ASSESSMENT — PATIENT HEALTH QUESTIONNAIRE - PHQ9: 2. FEELING DOWN, DEPRESSED OR HOPELESS: NOT AT ALL

## 2024-03-05 ASSESSMENT — PAIN SCALES - GENERAL: PAINLEVEL: 0-NO PAIN

## 2024-03-05 ASSESSMENT — LIFESTYLE VARIABLES
AUDIT-C TOTAL SCORE: 1
AUDIT-C TOTAL SCORE: 1
SKIP TO QUESTIONS 9-10: 1
HOW OFTEN DO YOU HAVE A DRINK CONTAINING ALCOHOL: MONTHLY OR LESS
HOW MANY STANDARD DRINKS CONTAINING ALCOHOL DO YOU HAVE ON A TYPICAL DAY: 1 OR 2
HOW OFTEN DO YOU HAVE SIX OR MORE DRINKS ON ONE OCCASION: NEVER

## 2024-03-05 ASSESSMENT — ANXIETY QUESTIONNAIRES
GAD7 TOTAL SCORE: 0
5. BEING SO RESTLESS THAT IT IS HARD TO SIT STILL: NOT AT ALL
3. WORRYING TOO MUCH ABOUT DIFFERENT THINGS: NOT AT ALL
6. BECOMING EASILY ANNOYED OR IRRITABLE: NOT AT ALL
4. TROUBLE RELAXING: NOT AT ALL
2. NOT BEING ABLE TO STOP OR CONTROL WORRYING: NOT AT ALL
7. FEELING AFRAID AS IF SOMETHING AWFUL MIGHT HAPPEN: NOT AT ALL
1. FEELING NERVOUS, ANXIOUS, OR ON EDGE: NOT AT ALL

## 2024-03-21 ENCOUNTER — OFFICE VISIT (OUTPATIENT)
Dept: BEHAVIORAL HEALTH | Facility: CLINIC | Age: 49
End: 2024-03-21
Payer: COMMERCIAL

## 2024-03-21 DIAGNOSIS — E66.09 CLASS 2 OBESITY DUE TO EXCESS CALORIES WITH BODY MASS INDEX (BMI) OF 39.0 TO 39.9 IN ADULT, UNSPECIFIED WHETHER SERIOUS COMORBIDITY PRESENT: ICD-10-CM

## 2024-03-21 DIAGNOSIS — Z72.4 PROBLEMS RELATED TO INAPPROPRIATE DIET AND EATING HABITS: ICD-10-CM

## 2024-03-21 PROCEDURE — 1036F TOBACCO NON-USER: CPT | Performed by: PSYCHOLOGIST

## 2024-03-21 PROCEDURE — 3008F BODY MASS INDEX DOCD: CPT | Performed by: PSYCHOLOGIST

## 2024-03-21 PROCEDURE — 90834 PSYTX W PT 45 MINUTES: CPT | Performed by: PSYCHOLOGIST

## 2024-03-21 NOTE — PROGRESS NOTES
Time Started: 900  Time Ended: 945  Total time: 45 minutes   Visit type: in-person    We discussed that the note will be visible and others healthcare practitioners will have access. The patient has consented to an unrestricted note.      Reason(s) for visit: follow up for bariatric surgery.    Interventions: review work and eating schedule. Help with interventions to manage graze eating.   Next steps follow up on progress and/or roadblocks with following a new eating schedule to help manage the grazing and the GERD and to have a more doable schedule.     11am, snack, 4pm lunch, 9pm dinner, snack when come home from work or between lunch and dinner.     MSE: Mood was reported as good. Affect was observed as euthymic.     Follow up: in 2-3 weeks.

## 2024-03-25 NOTE — PROGRESS NOTES
Patient: Ayanna Willard    [id number]  : 1975 -- AGE 48 y.o.    Provider: Hollie Clifton MD PhD     Location [facility]   Service Date: 3/26/2024              [facility]  New Visit Note      HISTORY OF PRESENT ILLNESS     The patient's referring provider is: Crow Navas MD    REASON FOR VISIT:   Chief Complaint   Patient presents with    Pt here today for NPV.        HISTORY OF PRESENT ILLNESS   Ms. Ayanna Willard is a 48 y.o. female with a history of DARBY, paroxysmal atrial fibrillation, GERD, prehypertension, vitamin D deficiency, hypothyroidism, vitamin B-12 deficiency who presents to a [facility] for a sleep medicine evaluation with concerns of possible sleep apnea in the setting of bariatric surgery evaluation.    PAST SLEEP HISTORY  She has had a prior sleep study performed on 12/3/2020 at a BMI of 36.7 weight of 101.2 kg.  She slept for 263 minutes for sleep efficiency of 60%.  Her overall AHI was 28 events/hour with an RDI of 40 events/hour (non-REM AHI 31 events/hour and REM AHI of 16 events/hour; nonsupine AHI 13 events/hour and supine AHI 39 events/hour and central apnea index of 13.3 events/hour).  She had a PLMI of 4.3 events/hour.  This study appeared to be consistent with the diagnosis of moderate to severe obstructive sleep apnea.  A CPAP titration was recommended at that time.    She was seen by Lelo Bishop in 2021 and reported sleep testing given prior to the study discussed above and was started on CPAP.  CPAP was stopped by her as she did not feel comfortable with the mask.  CPAP was again ordered on 2020 and the patient's residual AHI was about 3.2 events/hour at the 2021 visit.  Continued use of APAP at 5-15 CWP was recommended.    CURRENT HISTORY  On today's visit, the patient reports she is considering undergoing a sleeve gastrectomy with Dr. Navas.    In  2020 she was tested for DARBY in the setting snoring but otherwise asymptomatic.  She was prescribed APAP at 5-15 cwp. She notes some improvements in sleep quality with feeling more refreshed on awakening. She stopped using CPAP after switching insurances and having issues getting new supplies.    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     Associated sleep symptoms:  Breathing during sleep: snoring  Behaviors at night: No   Sleep paralysis: No   Hypnogogic or hypnopompic hallucinations: No   Cataplexy: No     Leg symptoms and timing:  - Sensations: Patient does not have unusual sensations in their extremities that cause an urge to move them       Sleep environment:  Pets in bed :  Yes - dog  Bedroom temperature: WNL  Noise :   No   Issues with bed comfort :   No     Daytime Symptoms:  On awakening patient reports: waking refreshed, morning dry mouth, and dry mouth  - sometimes    Daytime: During the day, she does not doze unintentionally while inactive.  During more active tasks, she never is drowsy.  With regards to daytime napping, the patient reports never taking naps. If she takes a nap, typically she awakens refreshed.  She does not report that poor sleep results in mood-related irritability. She does not report that poor sleep results in issues with memory/concentration. She works as a nurse at [employer] - she is a supervisor.    Driving History:  With driving, the patient denies sleepy driving, with 0 sleepiness-related motor vehicle accidents and 0 near misses.       ESS: 3  MARY: 2  FOSQ:  40      REVIEW OF SYSTEMS     REVIEW OF SYSTEMS  Review of Systems   All other systems reviewed and are negative.      ALLERGIES AND MEDICATIONS     ALLERGIES  No Known Allergies    MEDICATIONS  Current Outpatient Medications   Medication Sig Dispense Refill    cyanocobalamin (Vitamin B-12) 1,000 mcg/mL injection INJECT 1 ML (1,000 MCG) INTO THE SHOULDER, THIGH, OR BUTTOCKS EVERY 30 (THIRTY) DAYS. 3 mL 2    cyclobenzaprine (Flexeril) 5 mg tablet Take 1 tablet (5 mg) by mouth 3 times a day as needed for muscle spasms. 1-2 TABLETS PRN 30 tablet 1    ergocalciferol (Vitamin D-2) 1.25 MG (60932 UT) capsule Take 1 capsule (50,000 Units) by mouth 1 (one) time per week. 13 capsule 1    ibuprofen 800 mg tablet Take 1 tablet (800 mg) by mouth. W FOOD FOR 10 DAYS      metoprolol succinate XL (Toprol-XL) 25 mg 24 hr tablet Take 1 tablet (25 mg) by mouth once daily. 90 tablet 3    pantoprazole (ProtoNix) 40 mg EC tablet Take 1 tablet (40 mg) by mouth once daily. Do not crush, chew, or split. 90 tablet 1     No current facility-administered medications for this visit.         PAST HISTORY     PAST MEDICAL HISTORY  She  has a past medical history of Encounter for screening for malignant neoplasm of cervix (2016), History of bilateral mastectomy (2022), and Other specified health status.      PAST SURGICAL HISTORY:  Past Surgical History:   Procedure Laterality Date     SECTION, CLASSIC       Section     MR PELVIS ANGIO W IV CONTRAST  2016    MR PELVIS ANGIO W [place] CONTRAST [place] [facility] [place]    OTHER SURGICAL HISTORY  2016    Breast Reconstruction With Tissue Expander Bilateral    OTHER SURGICAL HISTORY      Simple Mastectomy Bilateral 2016    OTHER SURGICAL HISTORY      Oophorectomy - Bilat (Removal Of Both Ovaries) Laparoscopic     OTHER SURGICAL HISTORY  2016    Breast Surgery Reconstruction With Free Flap       FAMILY HISTORY  Family  History   Problem Relation Name Age of Onset    Colon cancer Mother      Breast cancer Mother      Breast cancer Sister      Breast cancer Mother's Sister      Colon cancer Mother's Brother      Colon cancer Maternal Grandmother      Other (C SECTION) Other FAM HX     Oophorectomy Other FAM HX      She does not have a family history of sleep disorder (e.g., sleep apnea, narcolepsy in any first degree relatives).      SOCIAL HISTORY  She  reports that she quit smoking about 15 years ago. Her smoking use included cigarettes. She has never used smokeless tobacco. She reports current alcohol use of about 1.0 standard drink of alcohol per week. She reports that she does not use drugs. She currently lives alone. She works as a nurse supervisor at [employer] which is a [employer].      Caffeine consumption: Yes - 1 cup if thar  Alcohol consumption: Yes - 1x/week  Smoking: Yes 2009 - 1ppd for 8 years  Marijuana: No      PHYSICAL EXAM     Physical Examination: /75 (BP Location: Right arm, Patient Position: Sitting)   Pulse 84   Temp 36.6 °C (97.9 °F) (Temporal)   Wt 119 kg (263 lb)   SpO2 95% Comment: RA  BMI 43.77 kg/m²     PREVIOUS WEIGHTS:  Wt Readings from Last 3 Encounters:   03/26/24 119 kg (263 lb)   03/05/24 107 kg (236 lb 3.2 oz)   02/23/24 103 kg (227 lb)       General: The patient is a pleasant female, in no acute distress. HEENT: She has a  a modified Mallampati grade 2 airway with Numbers; 0-4 (with +): 1+ tonsils bilaterally. The soft palate was normal and the uvula was normal. The A/P diameter of the velopharynx was not narrowed. The oropharynx was not shallow in the A/P diameter. Lateral wall narrowing was not present. Tongue ridging was notpresent. Erythema of the posterior pharynx was not present. Mucosal hypertrophy in the posterior oropharynx was not present. Retrognathia was not and micrognathia was not present. Neck: The neck was not enlarged. No JVP, bruits or lymphadenopathy was appreciated. Chest:  Clear to auscultation. No wheezes, rales, or rhonchi. Cardiovascular: Regular rate and rhythm. No murmurs, gallops, or clicks. Abdomen: Soft, nontender, nondistended. Positive bowel sounds. Extremities: No clubbing, cyanosis, or edema is noted. Neurologic exam: Alert, oriented x3 and was grossly non-focal.    RESULTS/DATA     Iron (ug/dL)   Date Value   02/22/2024 104     % Saturation (%)   Date Value   02/22/2024 32     TIBC (ug/dL)   Date Value   02/22/2024 328     Ferritin (ng/mL)   Date Value   02/22/2024 231 (H)       Bicarbonate (mmol/L)   Date Value   02/22/2024 29   08/28/2023 29   03/30/2023 30   01/24/2022 24         DIAGNOSES     Problem List and Orders  Diagnoses and all orders for this visit:  DARBY (obstructive sleep apnea)  -     Follow Up In Adult Sleep Medicine; Future  -     Positive Airway Pressure (PAP) Therapy  Bariatric surgery status  -     Referral to Adult Sleep Medicine  BMI 38.0-38.9,adult  -     Referral to Adult Sleep Medicine        ASSESSMENT/PLAN     Ms. Willard is a 48 y.o. female and with a history of DARBY, atrial fibrillation, GERD, prehypertension, vitamin D deficiency, hypothyroidism, vitamin B-12 deficiency. She presents to  the [facility] to address her DARBY.    DARBY.  She has known moderate to severe sleep apnea and has been on CPAP previously but it stopped due to the absence of supplies.  She is now planning on gastric bypass surgery so needs to restart her CPAP.  I do not think there is any indication for a new sleep study we just need to have her restart supplies and start using her CPAP at this time.  Her APAP is currently set at 5-15 CWP.    She should call our office in approximately 4 weeks to get an interim download that we can then transmit to her surgeon's office should she continue with her surgical evaluation.  She can follow-up with us in 3 to 4 months otherwise.      Obesity.  The patient was counseled that her weight is the strongest  modifiable risk factor and contributor for DARBY. She was counseled to consider weight loss options to include changes in dietary habits and activity. We briefly discussed the use of calorie tracking smartphone apps and the use of activity meters to provide feedback that helps in losing weight.  Before initiating an exercise plan, she should carefully review the approach with her primary care provider.     Atrial fibrillation. In this setting treatment of sleep apnea will minimize recurrence.    Follow up: 3-4 months      Hollie Clifton MD PhD

## 2024-03-26 ENCOUNTER — OFFICE VISIT (OUTPATIENT)
Dept: SLEEP MEDICINE | Facility: HOSPITAL | Age: 49
End: 2024-03-26
Payer: COMMERCIAL

## 2024-03-26 VITALS
OXYGEN SATURATION: 95 % | TEMPERATURE: 97.9 F | DIASTOLIC BLOOD PRESSURE: 75 MMHG | HEART RATE: 84 BPM | BODY MASS INDEX: 43.77 KG/M2 | SYSTOLIC BLOOD PRESSURE: 115 MMHG | WEIGHT: 263 LBS

## 2024-03-26 DIAGNOSIS — Z98.84 BARIATRIC SURGERY STATUS: ICD-10-CM

## 2024-03-26 DIAGNOSIS — G47.33 OSA (OBSTRUCTIVE SLEEP APNEA): Primary | ICD-10-CM

## 2024-03-26 PROCEDURE — 99204 OFFICE O/P NEW MOD 45 MIN: CPT | Performed by: INTERNAL MEDICINE

## 2024-03-26 PROCEDURE — 99214 OFFICE O/P EST MOD 30 MIN: CPT | Performed by: INTERNAL MEDICINE

## 2024-03-26 PROCEDURE — 3008F BODY MASS INDEX DOCD: CPT | Performed by: INTERNAL MEDICINE

## 2024-03-26 PROCEDURE — 1036F TOBACCO NON-USER: CPT | Performed by: INTERNAL MEDICINE

## 2024-03-26 ASSESSMENT — PATIENT HEALTH QUESTIONNAIRE - PHQ9
2. FEELING DOWN, DEPRESSED OR HOPELESS: NOT AT ALL
1. LITTLE INTEREST OR PLEASURE IN DOING THINGS: NOT AT ALL
SUM OF ALL RESPONSES TO PHQ9 QUESTIONS 1 & 2: 0

## 2024-03-26 ASSESSMENT — PAIN SCALES - GENERAL: PAINLEVEL: 0-NO PAIN

## 2024-03-26 ASSESSMENT — LIFESTYLE VARIABLES: HOW OFTEN DO YOU HAVE A DRINK CONTAINING ALCOHOL: MONTHLY OR LESS

## 2024-03-26 NOTE — PATIENT INSTRUCTIONS
[facility] Sleep Medicine  [facility] [place]  [address]  [facility] [address]  [phone]    [facility] [place]  [address]  [facility] [address]  [phone]  [facility] [place]  [address]  [facility] 6TH FLOOR  [facility] [address]           NAME: Ayanna Willard   DATE: 3/26/2024     Your Sleep Provider Today: Hollie Clifton MD PhD  Your Primary Care Physician: Macho Hicks MD   Your Referring Provider: Crow Navas MD    Thank you for coming to the Sleep Medicine Clinic today! Your sleep medicine provider today was: Hollie Clifton MD PhD Below is a summary of your treatment plan, other important information, and our contact numbers:  If you need to schedule an appointment, please call [phone]  If you need general assistance (e.g. forms completed, general questions), please call my , Jennifer, at [phone].  If you have a medical question about your sleep issues, please contact our nurses, Justyna or Lindsay at [phone].   You can also contact us through [employer].      DIAGNOSIS:   1. DARBY (obstructive sleep apnea)  Follow Up In Adult Sleep Medicine    Positive Airway Pressure (PAP) Therapy      2. Bariatric surgery status  Referral to Adult Sleep Medicine      3. BMI 38.0-38.9,adult  Referral to Adult Sleep Medicine              TREATMENT PLAN     For your surgery - let's have you restart using CPAP  We will request new supplies from [facility].  Call our office in 4 weeks after restarting CPAP so we can get your PAP report and send to your surgeons  office.    Instructions - Common DARBY Recs: - For your sleep apnea, continue to use your PAP every night and use it whenever you are sleeping.   - Avoid alcohol or sedatives several hours prior to sleeping.   - Get additional supplies for your PAP (e.g., mask, hose, filters) every 3 months or  as your insurance allows from your [employer] company. Replacement cushions for your PAP mask can be requested monthly if airseals are an issue.  - Remember to clean your mask, tubings, and water chamber regularly as instructed.  - Avoid driving or operating heavy machinery when drowsy. A person driving while sleepy is five (5) times more likely to have an accident. If you feel sleepy, pull over and take a short power nap (sleep for less than 30 minutes). Otherwise, ask somebody to drive you.    Follow-up Appointment:   Followup with me in 3-4 months.      IMPORTANT INFORMATION     Call 911 for medical emergencies.  Our offices are generally open from Monday-Friday, 9 am - 5 pm.  If you need to get in touch with me, you may either call me and my team(number is below) or you can use [employer].  If a referral for a test, for CPAP, or for another specialist was made, and you have not heard about scheduling this within a week, please call scheduling at [phone] ([phone]).  If you are unable to make your appointment for clinic or an overnight study, kindly call the office at least 48 hours in advance to cancel and reschedule.  If you are on CPAP, please bring your device's card or the device to each clinic appointment.   There are no supporting services by either the sleep doctors or their staff on weekends and Holidays, or after 5 PM on weekdays.   If you have been asked to come to a sleep study, make sure you bring toiletries, a comfy pillow, and any nighttime medications that you may regularly take. Also be sure to eat dinner before you arrive. We generally do not provide meals.      PRESCRIPTIONS     We require 7 days advanced notice for prescription refills. If we do not receive the request in this time, we cannot guarantee that your medication will be refilled in time.      IMPORTANT PHONE NUMBERS      scheduling for medical testin614 [phone]   Sleep Medicine Clinic Fax: [phone]  Appointments (for  Pediatric Sleep Clinic): [phone] - option 1  Appointments (for Adult Sleep Clinic): [phone] - option 2  Appointments (For Sleep Studies): [phone] - option 3  Behavioral Sleep Medicine: [phone]  Bariatric Surgery: [phone] ( Bariatric Surgery Website)   Sleep Surgery: [phone]  ENT (Otolaryngology): [phone]  Headache Clinic (Neurology): [phone]  Neurology: [phone]  Psychiatry: [phone]  Pulmonary Function Testing (PFT) Center: [phone]  Pulmonary Medicine: [phone]  [facility] (DME): [phone]  [facility] (DME): [phone]  [facility] (DME): [phone]      COMMON PROVIDERS WE REFER TO     For Weight Loss - Dr. Matthew Diaz - Call [phone]  For Sleep Surgery - Dr. Felisa Vail - Call [phone]      OUR ADULT SLEEP MEDICINE TEAM   Please do not hesitate to call the office or sleep nurse with any questions between appointments:    Adult Sleep Nurses (Lindsay Olvera, RN and Justyna Holman RN):  For clinical questions and refilling prescriptions: [phone]  Email sleep diaries and other documents at: [email or web address]    Adult Sleep Medicine Secretaries:  Radha Osborn (For Carie/Shaw/Krise/Strohl/Yeh/Aggarwal):   P: [phone]  F: [phone]  Jennifer Johnson (For Clifton/Guggenbiller): P: [phone]  Fax: [phone]  Kristina Owen (For Jurcevic/Blank): P: [phone]  F: [phone]  Glory Corbin (For [place]): P: [phone]  F: [phone]  Jenny Gan (For [place]/Cris/Zakhary): P: [phone]  F: [phone]  Batsheva Kee (For Bladimir/Martir): P: [phone]  F: [phone]     Adult Sleep Medicine Advanced Practice Providers:  Jose Green ([place], [place])  Rachael Lynch ([facility])  Lelo Bishop CNP ([facility])  Leeann Ryan, IGNACIO ([place], [place], [place])  Imelda Grove  "([place])  Rosa Mcmahan CNP ([place], [place])        OUR SLEEP TESTING LOCATIONS     Our team will contact you to schedule your sleep study, however, you can contact us as follow:  Main Phone Line (scheduling only): [phone]), option 3  Adult and Pediatric Locations  [facility] (6 years and older): [facility] - 4th floor ([address] [facility] [facility] [facility]) After hours line: [phone]  [facility] (Main campus: All ages): [facility], 6th floor. After hours line: [phone]   [place] (5 years and older; younger considered on case-by-case basis): [address]; [address], [address]. Scheduling  After hours line: [phone]   [place] (6 years and older): [address]; [address]; [address]   [place] (6 years and older): [address], [address]  After hours line: [phone]   [place] (13 years and older) in [place]: [address], 2nd floor  After hours line: [phone]  [facility] (13 year and older): [address]  After hours line: [phone]     Adult Only Locations:   [place] (18 years and older): [address] [facility], 2nd floor   [place] (18 years and older): [address] [facility]; 4th floor  After hours line: [phone]  [facility] (18 years and older) at [place]: [address]  After hours line: [phone]          CONTACTING YOUR SLEEP MEDICINE PROVIDER     Send a message directly to your provider through \"My Chart\", which is the email service through your  Records Account: [email or web address]   Call [phone] and leave a message. One of the administrative assistants will forward the message to your sleep medicine provider through \"My Chart\" and/or email.     Your sleep medicine provider for this visit was: Hollie Clifton MD PhD        "

## 2024-04-01 ENCOUNTER — OFFICE VISIT (OUTPATIENT)
Dept: PRIMARY CARE | Facility: CLINIC | Age: 49
End: 2024-04-01
Payer: COMMERCIAL

## 2024-04-01 VITALS
HEIGHT: 65 IN | HEART RATE: 91 BPM | BODY MASS INDEX: 38.65 KG/M2 | WEIGHT: 232 LBS | DIASTOLIC BLOOD PRESSURE: 84 MMHG | SYSTOLIC BLOOD PRESSURE: 120 MMHG

## 2024-04-01 DIAGNOSIS — E53.8 VITAMIN B12 DEFICIENCY: ICD-10-CM

## 2024-04-01 DIAGNOSIS — E66.2 CLASS 2 OBESITY WITH ALVEOLAR HYPOVENTILATION, SERIOUS COMORBIDITY, AND BODY MASS INDEX (BMI) OF 38.0 TO 38.9 IN ADULT (MULTI): ICD-10-CM

## 2024-04-01 DIAGNOSIS — Z00.00 ROUTINE GENERAL MEDICAL EXAMINATION AT A HEALTH CARE FACILITY: ICD-10-CM

## 2024-04-01 DIAGNOSIS — M79.671 RIGHT FOOT PAIN: Primary | ICD-10-CM

## 2024-04-01 DIAGNOSIS — I48.0 PAROXYSMAL ATRIAL FIBRILLATION (MULTI): ICD-10-CM

## 2024-04-01 DIAGNOSIS — E55.9 VITAMIN D DEFICIENCY: ICD-10-CM

## 2024-04-01 PROBLEM — Z98.84 HISTORY OF BARIATRIC SURGERY: Status: ACTIVE | Noted: 2024-01-09

## 2024-04-01 PROBLEM — Z86.0100 HISTORY OF COLONIC POLYPS: Status: ACTIVE | Noted: 2023-10-31

## 2024-04-01 PROBLEM — Z86.010 HISTORY OF COLONIC POLYPS: Status: ACTIVE | Noted: 2023-10-31

## 2024-04-01 PROCEDURE — 99396 PREV VISIT EST AGE 40-64: CPT | Performed by: FAMILY MEDICINE

## 2024-04-01 PROCEDURE — 3008F BODY MASS INDEX DOCD: CPT | Performed by: FAMILY MEDICINE

## 2024-04-01 RX ORDER — CYANOCOBALAMIN 1000 UG/ML
1000 INJECTION, SOLUTION INTRAMUSCULAR; SUBCUTANEOUS
Qty: 3 ML | Refills: 2 | Status: SHIPPED | OUTPATIENT
Start: 2024-04-01 | End: 2024-04-22

## 2024-04-01 RX ORDER — IBUPROFEN 800 MG/1
800 TABLET ORAL 3 TIMES DAILY
Qty: 90 TABLET | Refills: 0 | Status: SHIPPED | OUTPATIENT
Start: 2024-04-01 | End: 2024-04-29

## 2024-04-01 ASSESSMENT — ENCOUNTER SYMPTOMS
DEPRESSION: 0
OCCASIONAL FEELINGS OF UNSTEADINESS: 0
LOSS OF SENSATION IN FEET: 0

## 2024-04-01 ASSESSMENT — ANXIETY QUESTIONNAIRES
1. FEELING NERVOUS, ANXIOUS, OR ON EDGE: NOT AT ALL
6. BECOMING EASILY ANNOYED OR IRRITABLE: NOT AT ALL
7. FEELING AFRAID AS IF SOMETHING AWFUL MIGHT HAPPEN: NOT AT ALL
4. TROUBLE RELAXING: NOT AT ALL
5. BEING SO RESTLESS THAT IT IS HARD TO SIT STILL: NOT AT ALL
3. WORRYING TOO MUCH ABOUT DIFFERENT THINGS: NOT AT ALL
IF YOU CHECKED OFF ANY PROBLEMS ON THIS QUESTIONNAIRE, HOW DIFFICULT HAVE THESE PROBLEMS MADE IT FOR YOU TO DO YOUR WORK, TAKE CARE OF THINGS AT HOME, OR GET ALONG WITH OTHER PEOPLE: NOT DIFFICULT AT ALL
GAD7 TOTAL SCORE: 0
2. NOT BEING ABLE TO STOP OR CONTROL WORRYING: NOT AT ALL

## 2024-04-01 ASSESSMENT — PATIENT HEALTH QUESTIONNAIRE - PHQ9
1. LITTLE INTEREST OR PLEASURE IN DOING THINGS: NOT AT ALL
SUM OF ALL RESPONSES TO PHQ9 QUESTIONS 1 AND 2: 0
2. FEELING DOWN, DEPRESSED OR HOPELESS: NOT AT ALL

## 2024-04-02 PROBLEM — E66.2: Status: ACTIVE | Noted: 2024-04-02

## 2024-04-02 PROBLEM — E66.812 CLASS 2 OBESITY WITH ALVEOLAR HYPOVENTILATION, SERIOUS COMORBIDITY, AND BODY MASS INDEX (BMI) OF 38.0 TO 38.9 IN ADULT: Status: ACTIVE | Noted: 2024-04-02

## 2024-04-02 PROBLEM — Z00.00 ROUTINE GENERAL MEDICAL EXAMINATION AT A HEALTH CARE FACILITY: Status: ACTIVE | Noted: 2024-04-02

## 2024-04-02 RX ORDER — ERGOCALCIFEROL 1.25 MG/1
50000 CAPSULE ORAL
Qty: 13 CAPSULE | Refills: 1 | Status: SHIPPED | OUTPATIENT
Start: 2024-04-02

## 2024-04-02 NOTE — PROGRESS NOTES
"  Subjective     Patient ID: Ayanna Willard is a 48 y.o. female who presents for Annual Exam (Concerns about labs).      Last Physical : 1 Years ago     Pt's PMH, PSH, SH, FH , meds and allergies was obtained / reviewed and updated .     Dental visits : Y  Vision issues : N  Hearing issues : N    Immunizations : Y    Diet :  could be better  Exercise:  Weight concerns :     Alcohol: as noted in SH  Tobacco: as noted in SH  Recreational drug use : None/ as noted in SH    Sexually active : Active   Contraception :   Menstrual problems:  Premenopausal/perimenopausal/ postmenopausal:    G:  Parity:  Full term:    Premature:   (s):   Living :  Ab induced:   Ab spontaneous :  Ectopic :   Multiple :    PAP smear :  Mammogram :na  Colonoscopy:    Metabolic screening   - Lipid   - Glucose  Patient had recent blood work which showed elevated ferritin level has history of fatty liver disease is in the process of getting approved for gastric sleeve bariatric surgery has been continuing  to eat healthy has lost weight  Eating 1500 shen per day with 60-80 grams of protein  Limiting carb intake   Drinking 6-8 glasses of water  Exercising 30mins daily  Has tried weight watchers intermittent fasting medically supervised weight loss plans  Would benefit from Bariatric surgery due to MCKINLEY.     ======================================================    Visit Vitals  Blood Pressure 120/84   Pulse 91   Height 1.651 m (5' 5\")   Weight 105 kg (232 lb)   Body Mass Index 38.61 kg/m²   Smoking Status Former   Body Surface Area 2.19 m²      No LMP recorded.     =====================================    Review of systems:  Constitutional: no chills, no fever and no night sweats.     Eyes: no blurred vision and no eyesight problems.     ENT: no hearing loss, no nasal congestion, no nasal discharge, no hoarseness and no sore throat.     Cardiovascular: no chest pain, no intermittent leg claudication, no lower extremity edema, no palpitations " and no syncope.     Respiratory: no cough, no shortness of breath during exertion, no shortness of breath at rest and no wheezing.     Gastrointestinal: no abdominal pain, no blood in stools, no constipation, no diarrhea, no melena, no nausea, no rectal pain and no vomiting.     Genitourinary: no dysuria, no change in urinary frequency, no urinary hesitancy, no feelings of urinary urgency and no vaginal discharge.     Musculoskeletal: no arthralgias, no back pain and no myalgias.     Integumentary: no new skin lesions and no rashes.     Neurological: no difficulty walking, no headache, no limb weakness, no numbness and no tingling.     Psychiatric: no anxiety, no depression, no anhedonia and no substance use disorders.     Endocrine: no recent weight gain and no recent weight loss.     Hematologic/Lymphatic: no tendency for easy bruising and no swollen glands.   ============================================================    Physical exam :    Constitutional: Alert and in no acute distress. Well developed, well nourished.     Eyes: Normal external exam. Pupils were equal in size, round, reactive to light (PERRL) with normal accommodation and extraocular movements intact (EOMI).     Ears, Nose, Mouth, and Throat: External inspection of ears and nose: Normal.  Otoscopic examination: Normal.      Neck: No neck mass was observed. Supple.     Cardiovascular: Heart rate and rhythm were normal, normal S1 and S2, no gallops, no murmurs and no pericardial rub    Pulmonary: No respiratory distress. Clear bilateral breath sounds.     Abdomen: Soft nontender; no abdominal mass palpated. No organomegaly.     Musculoskeletal: No joint swelling seen, normal movements of all extremities. Range of motion: Normal.  Muscle strength/tone: Normal.      Skin: Normal skin color and pigmentation, normal skin turgor, and no rash.     Neurologic: Deep tendon reflexes were 2+ and symmetric. Sensation: Normal.     Psychiatric: Judgment and  insight: Intact. Mood and affect: Normal.    Lymphatic : Cervical/ axillary/ groin Lns Palpable/ non palpable            All other systems have been reviewed and are negative for complaint.      Assessment/Plan    Problem List Items Addressed This Visit             ICD-10-CM    Afib (CMS/[facility]) I48.91     C/w metoprolol         Right foot pain - Primary M79.671    Relevant Medications    ibuprofen 800 mg tablet    Vitamin D deficiency E55.9     Add OTC vit D 2000 units daily c/w 15932 international units weekly          Relevant Medications    ergocalciferol (Vitamin D-2) 1.25 MG (36724 UT) capsule    Vitamin B12 deficiency E53.8    Relevant Medications    cyanocobalamin (Vitamin B-12) 1,000 mcg/mL injection    Routine general medical examination at a health care facility Z00.00     Does not need mammogram due to bilateral mastectomy  Up-to-date colonoscopy  Up-to-date immunization           Class 2 obesity with alveolar hypoventilation, serious comorbidity, and body mass index (BMI) of 38.0 to 38.9 in adult (CMS/[facility]) E66.2, Z68.38     Patient has tried various medically supervised weight loss programs patient would be a good candidate for bariatric surgery and would benefit from upcoming bariatric surgery due to history of fatty liver disease and atrial fibrillation 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

## 2024-04-02 NOTE — ASSESSMENT & PLAN NOTE
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

## 2024-04-02 NOTE — ASSESSMENT & PLAN NOTE
Does not need mammogram due to bilateral mastectomy  Up-to-date colonoscopy  Up-to-date immunization

## 2024-04-04 ENCOUNTER — TELEMEDICINE CLINICAL SUPPORT (OUTPATIENT)
Dept: SURGERY | Facility: CLINIC | Age: 49
End: 2024-04-04
Payer: COMMERCIAL

## 2024-04-04 DIAGNOSIS — Z98.84 BARIATRIC SURGERY STATUS: Primary | ICD-10-CM

## 2024-04-12 ENCOUNTER — OFFICE VISIT (OUTPATIENT)
Dept: BEHAVIORAL HEALTH | Facility: CLINIC | Age: 49
End: 2024-04-12
Payer: COMMERCIAL

## 2024-04-12 VITALS — BODY MASS INDEX: 38.84 KG/M2 | WEIGHT: 233.4 LBS

## 2024-04-12 DIAGNOSIS — E66.2 CLASS 2 OBESITY WITH ALVEOLAR HYPOVENTILATION, SERIOUS COMORBIDITY, AND BODY MASS INDEX (BMI) OF 38.0 TO 38.9 IN ADULT (MULTI): ICD-10-CM

## 2024-04-12 DIAGNOSIS — Z72.4 PROBLEMS RELATED TO INAPPROPRIATE DIET AND EATING HABITS: ICD-10-CM

## 2024-04-12 PROCEDURE — 3008F BODY MASS INDEX DOCD: CPT | Performed by: PSYCHOLOGIST

## 2024-04-12 PROCEDURE — 90834 PSYTX W PT 45 MINUTES: CPT | Performed by: PSYCHOLOGIST

## 2024-04-12 NOTE — LETTER
DATE: 2024    RE: Clearance for Ayanna Willard for bariatric surgery. : 1975    Dear Dr. Yosef JUDD conducted a behavioral health evaluation for bariatric surgery with Ayanna Willard on 2024. Ayanna denied a history of an eating disorder and does not meet criteria for binge eating, night eating, bulimia, or anorexia.     Ayanna did not meet criteria for problematic alcohol use. She denied a history of an alcohol use, prescription drug use or drug dependence. She was dependent on nicotine and last smoked cigarettes in . She denied the current use of tobacco, CBD or cannabis products, or illicit substances.     Ayanna is not being treated for mental health conditions. She denied a history of mental health treatment. She denied being hospitalized for mental health problems, a history of suicide attempts, or self-injurious behaviors. She denied any current suicidal ideation or plan.     Results from the evaluation and psychological assessments indicated that she met criteria for graze eating habits. Since the evaluation, she has followed up 1:1 to learn coping skills to manage this eating behavior. Ayanna's depression and generalized anxiety disorder assessments were negative.     Ayanna has an adequate support system. She identified her adult son and sister as the primary support persons, post-surgery. She reported work-related stress with effective coping skills, which will help her to cope with the post-surgery recommendations. The timing for surgery is good. She is motivated for weight loss surgery. She has made several changes to her dietary habits.      Taken together, Ayanna is cleared for weight loss surgery from a behavioral health perspective. There are no known contraindications at this time. However, we reserve the right to change our opinion based on additional information.     Sincerely,     Shani Márquez, PhD   Psychologist  Director of Behavioral Health  Services for Bariatric Surgery and Weight Management

## 2024-04-12 NOTE — PROGRESS NOTES
Time Started: 1004  Time Ended: 10:45  Total time: 41 minutes  Visit type: in person visit    We discussed that the note will be visible and others healthcare practitioners will have access. The patient has consented to an unrestricted note.      Reason(s) for visit: follow up to assess readiness for bariatric surgery.  Progress: she has been sticking to a consistent eating schedule (11, 4, and 9) with 1-2 snacks per day. She has been eating less after work. She has been following the 30/30/30. She has been wearing her CPAP machine. From her food diary, she learned that she has decreased her portion sizes. She is not drinking coffee any longer and is not drinking pop.     Interventions: discussed an exercise plan to increase from 1x/week to 2x/week until she is up to 4x/week.   Next steps: Ayanna is cleared for weight loss surgery from a behavioral health perspective.     MSE: She was calm and in a good mood. Her affect was observed as euthymic. No suicidal ideation or plan.     Follow up: post surgery. Ayanna is cleared for bariatric surgery from a behavioral health perspective.

## 2024-04-17 ENCOUNTER — DOCUMENTATION (OUTPATIENT)
Dept: SURGERY | Facility: HOSPITAL | Age: 49
End: 2024-04-17
Payer: COMMERCIAL

## 2024-04-17 NOTE — PROGRESS NOTES
Received notification of psych clearance. Work list updated, and sent to the pt via [employer] for her review.

## 2024-04-22 DIAGNOSIS — E53.8 VITAMIN B12 DEFICIENCY: ICD-10-CM

## 2024-04-22 RX ORDER — CYANOCOBALAMIN 1000 UG/ML
1000 INJECTION, SOLUTION INTRAMUSCULAR; SUBCUTANEOUS
Qty: 9 ML | Refills: 1 | Status: SHIPPED | OUTPATIENT
Start: 2024-04-22

## 2024-04-23 NOTE — PROGRESS NOTES
PROGRESS:  Recommendations (2/23/24)  1.          Continue to eat 3 meals/day, 1-2 snacks as needed.  2.          Have a good source of protein at every meal & snack.  Aim for 3-4 ounces per meal (20-30 grams).  3.          Drink 64oz water daily. Switch to diet juice and wean off coffee.   4.          Practice no drinking 30 minutes before meals, nothing with meals and wait 30 minutes after meals to drink again. Make meals last 30 minutes-chew thoroughly.   5.          Continue daily multivitamin. Talk to your PCP about increasing your vitamin D to 5000 international units daily.   6.          Increase physical activity to 45 minutes 3 days/week OR 30 minutes 4 days/week.  7.          Attend the Virtual New Patient Class on Thursday, April 4th  at 12:30 PM. I will email you the ZOOM link on Monday, April 1st.       Patient Meeting Goals: YES        ASSESSMENT:  Current weight:   233 lbs    Ht: 65 in   BMI: 38.8  Previous weight: 227 lbs        Initial start weight:  233 lbs      24 HOUR RECALL/DIET HISTORY:  Breakfast:  11AM - chicken sausage, eggs, stone  Snack:    Lunch: 4PM - seven layer salad (meat, sour cream, cheese, HB egg, black beans)  Snack: protein bar or fruit   Dinner: 9PM - fish, rice vegetable   Snack:    Beverages: water, crystal light   Alcohol: 1-2 glasses in last 2 months       Exercise: elliptical 30 minutes 2 days/week, 1 hour walking 2 days/week   Vitamins/minerals:  vitamin D weekly, B12 monthly, vitamin E, probiotic daily , MVI        READINESS TO LEARN:  Motivation to learn: Interested        Understanding of instruction: Good    Anticipated Compliance: Good         Family Support: Unable to assess-family not present          Educational Materials Provided:  Sending food lgos from [employer] tracy         Patient presents with excessive calorie obesity seeking  sleeve gastrectomy.    Pt seen today to follow up as she completes remaining clearances. Patient's weight is self reported. Patient expressed  concern with recent weight gain of 5# in last 2 months. States she does not know why she had gained weight. Denies any significant changes in her eating pattern, has stopped drinking sugary beverages and coffee, and reports she has increased her exercise.  Recommended to use 1500 calorie meal plan to help with portion control and encouraged to record PO intake in BT tracy on her phone. Encouraged to send food logs to RD once/week to review. Follow up in 1 month.       Malnutrition Screening:  Significant unintentional weight loss? No  Eating less than 75% of usual intake for more than 2 weeks? No      Nutrition Diagnosis:   1. Overweight/obesity related to excess energy intake as evidenced by BMI = 40 kg/m^2.  2. Food- and nutrition-related knowledge deficit related to lack of prior exposure to surgical weight loss information as evidenced by pt new to surgical program.    Nutrition Interventions:   1. Modify type and amount of food and nutrients within meals and snacks.  2. Comprehensive Nutrition Education    Recommendations:  1. Record what you eat daily into the [employer] tracy on your phone. Email me your foods logs weekly for review and feedback.  2. Continue to eat 3 meals/day, 1-2 snacks as needed.  3. Continue to drink 64oz water daily.  4. Continue to practice no drinking 30 minutes before meals, nothing with meals and wait 30 minutes after meals to drink again. Make meals last 30 minutes-chew thoroughly.   5. Continue to exercise for 30-60 minutes 5 day/week. Consistency is the key.    Pre-op Goal weight: lose 5% of body weight    Nutrition Monitoring and Evaluation: 1-2 pound weight loss per week  Criteria: weight check  Need for Follow-up: 1 month

## 2024-04-29 ENCOUNTER — NUTRITION (OUTPATIENT)
Dept: SURGERY | Facility: HOSPITAL | Age: 49
End: 2024-04-29
Payer: COMMERCIAL

## 2024-04-29 ENCOUNTER — OFFICE VISIT (OUTPATIENT)
Dept: OBSTETRICS AND GYNECOLOGY | Facility: CLINIC | Age: 49
End: 2024-04-29
Payer: COMMERCIAL

## 2024-04-29 VITALS
SYSTOLIC BLOOD PRESSURE: 122 MMHG | WEIGHT: 238 LBS | HEIGHT: 65 IN | BODY MASS INDEX: 39.65 KG/M2 | DIASTOLIC BLOOD PRESSURE: 80 MMHG

## 2024-04-29 DIAGNOSIS — M79.671 RIGHT FOOT PAIN: ICD-10-CM

## 2024-04-29 DIAGNOSIS — Z98.84 BARIATRIC SURGERY STATUS: Primary | ICD-10-CM

## 2024-04-29 DIAGNOSIS — N95.2 VAGINAL ATROPHY: Primary | ICD-10-CM

## 2024-04-29 DIAGNOSIS — N95.1 HOT FLASHES DUE TO MENOPAUSE: ICD-10-CM

## 2024-04-29 PROCEDURE — 1036F TOBACCO NON-USER: CPT | Performed by: NURSE PRACTITIONER

## 2024-04-29 PROCEDURE — 99204 OFFICE O/P NEW MOD 45 MIN: CPT | Performed by: NURSE PRACTITIONER

## 2024-04-29 PROCEDURE — 3008F BODY MASS INDEX DOCD: CPT | Performed by: NURSE PRACTITIONER

## 2024-04-29 RX ORDER — OXYBUTYNIN CHLORIDE 5 MG/1
5 TABLET, EXTENDED RELEASE ORAL DAILY
Qty: 30 TABLET | Refills: 11 | Status: SHIPPED | OUTPATIENT
Start: 2024-04-29 | End: 2025-04-29

## 2024-04-29 RX ORDER — IBUPROFEN 800 MG/1
800 TABLET ORAL 3 TIMES DAILY
Qty: 90 TABLET | Refills: 0 | OUTPATIENT
Start: 2024-04-29 | End: 2024-05-14

## 2024-04-29 ASSESSMENT — ENCOUNTER SYMPTOMS
NEUROLOGICAL NEGATIVE: 0
ALLERGIC/IMMUNOLOGIC NEGATIVE: 0
HEMATOLOGIC/LYMPHATIC NEGATIVE: 0
EYES NEGATIVE: 0
RESPIRATORY NEGATIVE: 0
CARDIOVASCULAR NEGATIVE: 0
GASTROINTESTINAL NEGATIVE: 0
MUSCULOSKELETAL NEGATIVE: 0
ENDOCRINE NEGATIVE: 0
CONSTITUTIONAL NEGATIVE: 0
PSYCHIATRIC NEGATIVE: 0

## 2024-04-29 ASSESSMENT — PAIN SCALES - GENERAL: PAINLEVEL: 0-NO PAIN

## 2024-04-29 NOTE — PROGRESS NOTES
Subjective   Patient ID: Ayanna Willard is a 48 y.o. female who presents for Menopause.  HPI  Concerns:     Menopausal age: 34  2010 Oophorectomy and bilateral mastectomy    Any Contraindications to HT: personal h/o breast cancer, estrogen sensitive cancer, dementia, stroke, MI, VTE or inherited high risk for VTE, SCAD: none  h/o congenital heart disease (increased risk of DVT) none  H/o atrial fibrillation x1    HT: none  use of OTC remedies: primose oil  bioidenticals: none    Vaginal bleeding:  none (she still has her uterus)  VMS: yes  Sleep difficulties: yes  Mood changes: yes  Joint pain: yes  Brain fog/difficulty concentrating: yes    GSM: yes  are you sexually active: yes  dyspareunia: yes, initial insertion  decrease in desire: sometimes  difficulty reaching orgasm:  none  Urinary Incontinence: none         Fractures: none  H/O abnormal pap: none    FH:   breast cancer:  mother, sister  ovarian cancer: none  colon cancer: sister  pancreatic cancer: none  Pt for +BRCA    Social history:  lives with: son  occupation:  nurse  Exercise:  yes  weight bearing: yes     Review of Systems    Objective   Physical Exam    Assessment/Plan   There are no diagnoses linked to this encounter.  VMS: decision for oxybutynin  I am concerned that she has been in menopause since 33yo but I am also concerned about starting MHT after 14 years and she has a h/o atrial fibrillation    Imvexxy prescribed for GSM  Genitourinary syndrome of menopause is very common and undertreated in perimenopausal and menopausal women. It is due to the low levels of estrogen and sex steroids. Common symptoms include: vaginal dryness, pain with sex, frequent urinary tract infections, burning, itching, and irritation. Treatment options can include vaginal moisturizers and lubricants and prescription vaginal estrogen, intravaginal DHEA and oral Osphena. The prescription options have similar efficacy and low risk. However, there are black box warnings  on the vaginal estrogen products and Osphena that are based off of studies of systemic HT in the WHI study, not studies done on vaginal estrogen or Osphena.    The prescription options may take up to 12 weeks to alleviate symptoms and if discontinued, the symptoms will return.     Follow up in 6 weeks       JUANCARLOS Schumacher-CNP 04/29/24 2:33 PM

## 2024-04-30 ENCOUNTER — DOCUMENTATION (OUTPATIENT)
Dept: SURGERY | Facility: HOSPITAL | Age: 49
End: 2024-04-30
Payer: COMMERCIAL

## 2024-05-01 ENCOUNTER — DOCUMENTATION (OUTPATIENT)
Dept: SURGERY | Facility: HOSPITAL | Age: 49
End: 2024-05-01
Payer: COMMERCIAL

## 2024-05-08 ENCOUNTER — DOCUMENTATION (OUTPATIENT)
Dept: SURGERY | Facility: HOSPITAL | Age: 49
End: 2024-05-08
Payer: COMMERCIAL

## 2024-05-09 ENCOUNTER — TELEPHONE (OUTPATIENT)
Dept: SLEEP MEDICINE | Facility: HOSPITAL | Age: 49
End: 2024-05-09
Payer: COMMERCIAL

## 2024-05-09 ENCOUNTER — DOCUMENTATION (OUTPATIENT)
Dept: SURGERY | Facility: HOSPITAL | Age: 49
End: 2024-05-09
Payer: COMMERCIAL

## 2024-05-10 ENCOUNTER — DOCUMENTATION (OUTPATIENT)
Dept: SLEEP MEDICINE | Facility: CLINIC | Age: 49
End: 2024-05-10
Payer: COMMERCIAL

## 2024-05-10 NOTE — PROGRESS NOTES
Patient PAP report reviewed and shows excellent adherence. From an DARBY management perspective, she is at low risk of donovan-operative complications after bariatric surgery with continued use of CPAP for her DARBY.    Instructions in Preparing for Surgery When You Have Sleep Apnea    Bring your PAP and all equipment with you to surgery.  Use your PAP daily before surgery and after surgery as soon as able.   Keep your head of the bed at 30 degrees or higher whichever is comfortable for patient.  We advise judicious use of pain medications and sedatives post-operatively as these medications can worsen sleep apnea.  We advise that you be closely monitored post-operatively due to increased risk of complications related to sleep apnea.   You are at increased but not prohibitive risk of postoperative respiratory complications due to DARBY.   You are optimized on PAP therapy for sleep apnea as long as you are compliant with PAP use per most recent download.  You have expressed your understanding of the above instructions and risks.

## 2024-05-13 ENCOUNTER — ANESTHESIA EVENT (OUTPATIENT)
Dept: GASTROENTEROLOGY | Facility: HOSPITAL | Age: 49
End: 2024-05-13
Payer: COMMERCIAL

## 2024-05-14 ENCOUNTER — ANESTHESIA (OUTPATIENT)
Dept: GASTROENTEROLOGY | Facility: HOSPITAL | Age: 49
End: 2024-05-14
Payer: COMMERCIAL

## 2024-05-14 ENCOUNTER — HOSPITAL ENCOUNTER (OUTPATIENT)
Dept: GASTROENTEROLOGY | Facility: HOSPITAL | Age: 49
Setting detail: OUTPATIENT SURGERY
Discharge: HOME | End: 2024-05-14
Payer: COMMERCIAL

## 2024-05-14 VITALS
WEIGHT: 233 LBS | HEART RATE: 76 BPM | OXYGEN SATURATION: 96 % | DIASTOLIC BLOOD PRESSURE: 97 MMHG | TEMPERATURE: 99 F | HEIGHT: 65 IN | SYSTOLIC BLOOD PRESSURE: 120 MMHG | BODY MASS INDEX: 38.82 KG/M2 | RESPIRATION RATE: 16 BRPM

## 2024-05-14 DIAGNOSIS — Z98.84 BARIATRIC SURGERY STATUS: ICD-10-CM

## 2024-05-14 LAB — PREGNANCY TEST URINE, POC: NEGATIVE

## 2024-05-14 PROCEDURE — 7100000010 HC PHASE TWO TIME - EACH INCREMENTAL 1 MINUTE: Performed by: SURGERY

## 2024-05-14 PROCEDURE — 88305 TISSUE EXAM BY PATHOLOGIST: CPT | Mod: TC,SUR | Performed by: SURGERY

## 2024-05-14 PROCEDURE — 43239 EGD BIOPSY SINGLE/MULTIPLE: CPT | Performed by: SURGERY

## 2024-05-14 PROCEDURE — A43239 PR EDG TRANSORAL BIOPSY SINGLE/MULTIPLE: Performed by: ANESTHESIOLOGY

## 2024-05-14 PROCEDURE — 7100000009 HC PHASE TWO TIME - INITIAL BASE CHARGE: Performed by: SURGERY

## 2024-05-14 PROCEDURE — 2500000004 HC RX 250 GENERAL PHARMACY W/ HCPCS (ALT 636 FOR OP/ED): Performed by: STUDENT IN AN ORGANIZED HEALTH CARE EDUCATION/TRAINING PROGRAM

## 2024-05-14 PROCEDURE — 2500000004 HC RX 250 GENERAL PHARMACY W/ HCPCS (ALT 636 FOR OP/ED)

## 2024-05-14 PROCEDURE — 3700000001 HC GENERAL ANESTHESIA TIME - INITIAL BASE CHARGE: Performed by: SURGERY

## 2024-05-14 PROCEDURE — 88305 TISSUE EXAM BY PATHOLOGIST: CPT | Performed by: PATHOLOGY

## 2024-05-14 PROCEDURE — 43239 EGD BIOPSY SINGLE/MULTIPLE: CPT | Performed by: STUDENT IN AN ORGANIZED HEALTH CARE EDUCATION/TRAINING PROGRAM

## 2024-05-14 PROCEDURE — 3700000002 HC GENERAL ANESTHESIA TIME - EACH INCREMENTAL 1 MINUTE: Performed by: SURGERY

## 2024-05-14 RX ORDER — OXYCODONE HYDROCHLORIDE 5 MG/1
5 TABLET ORAL EVERY 4 HOURS PRN
Status: DISCONTINUED | OUTPATIENT
Start: 2024-05-14 | End: 2024-05-15 | Stop reason: HOSPADM

## 2024-05-14 RX ORDER — ONDANSETRON HYDROCHLORIDE 2 MG/ML
4 INJECTION, SOLUTION INTRAVENOUS ONCE AS NEEDED
Status: DISCONTINUED | OUTPATIENT
Start: 2024-05-14 | End: 2024-05-15 | Stop reason: HOSPADM

## 2024-05-14 RX ORDER — SODIUM CHLORIDE, SODIUM LACTATE, POTASSIUM CHLORIDE, CALCIUM CHLORIDE 600; 310; 30; 20 MG/100ML; MG/100ML; MG/100ML; MG/100ML
20 INJECTION, SOLUTION INTRAVENOUS CONTINUOUS
Status: DISCONTINUED | OUTPATIENT
Start: 2024-05-14 | End: 2024-05-15 | Stop reason: HOSPADM

## 2024-05-14 RX ORDER — FENTANYL CITRATE 50 UG/ML
50 INJECTION, SOLUTION INTRAMUSCULAR; INTRAVENOUS EVERY 5 MIN PRN
Status: DISCONTINUED | OUTPATIENT
Start: 2024-05-14 | End: 2024-05-15 | Stop reason: HOSPADM

## 2024-05-14 RX ORDER — PROPOFOL 10 MG/ML
INJECTION, EMULSION INTRAVENOUS AS NEEDED
Status: DISCONTINUED | OUTPATIENT
Start: 2024-05-14 | End: 2024-05-14

## 2024-05-14 RX ORDER — FENTANYL CITRATE 50 UG/ML
INJECTION, SOLUTION INTRAMUSCULAR; INTRAVENOUS AS NEEDED
Status: DISCONTINUED | OUTPATIENT
Start: 2024-05-14 | End: 2024-05-14

## 2024-05-14 RX ORDER — LIDOCAINE HYDROCHLORIDE 10 MG/ML
0.1 INJECTION INFILTRATION; PERINEURAL ONCE
Status: DISCONTINUED | OUTPATIENT
Start: 2024-05-14 | End: 2024-05-15 | Stop reason: HOSPADM

## 2024-05-14 RX ORDER — ACETAMINOPHEN 325 MG/1
650 TABLET ORAL EVERY 4 HOURS PRN
Status: DISCONTINUED | OUTPATIENT
Start: 2024-05-14 | End: 2024-05-15 | Stop reason: HOSPADM

## 2024-05-14 RX ORDER — MIDAZOLAM HYDROCHLORIDE 1 MG/ML
INJECTION INTRAMUSCULAR; INTRAVENOUS AS NEEDED
Status: DISCONTINUED | OUTPATIENT
Start: 2024-05-14 | End: 2024-05-14

## 2024-05-14 RX ORDER — SODIUM CHLORIDE, SODIUM LACTATE, POTASSIUM CHLORIDE, CALCIUM CHLORIDE 600; 310; 30; 20 MG/100ML; MG/100ML; MG/100ML; MG/100ML
100 INJECTION, SOLUTION INTRAVENOUS CONTINUOUS
Status: DISCONTINUED | OUTPATIENT
Start: 2024-05-14 | End: 2024-05-15 | Stop reason: HOSPADM

## 2024-05-14 RX ADMIN — SODIUM CHLORIDE, POTASSIUM CHLORIDE, SODIUM LACTATE AND CALCIUM CHLORIDE: 600; 310; 30; 20 INJECTION, SOLUTION INTRAVENOUS at 07:45

## 2024-05-14 RX ADMIN — PROPOFOL 20 MG: 10 INJECTION, EMULSION INTRAVENOUS at 07:56

## 2024-05-14 RX ADMIN — FENTANYL CITRATE 25 MCG: 50 INJECTION, SOLUTION INTRAMUSCULAR; INTRAVENOUS at 07:58

## 2024-05-14 RX ADMIN — MIDAZOLAM HYDROCHLORIDE 2 MG: 1 INJECTION, SOLUTION INTRAMUSCULAR; INTRAVENOUS at 07:48

## 2024-05-14 RX ADMIN — PROPOFOL 200 MCG/KG/MIN: 10 INJECTION, EMULSION INTRAVENOUS at 07:57

## 2024-05-14 ASSESSMENT — ENCOUNTER SYMPTOMS
GASTROINTESTINAL NEGATIVE: 1
ABDOMINAL DISTENTION: 0
HEMATOLOGIC/LYMPHATIC NEGATIVE: 1
RESPIRATORY NEGATIVE: 1
EYES NEGATIVE: 1
ENDOCRINE NEGATIVE: 1
CONSTIPATION: 0
CONSTITUTIONAL NEGATIVE: 1
PSYCHIATRIC NEGATIVE: 1
MUSCULOSKELETAL NEGATIVE: 1
ALLERGIC/IMMUNOLOGIC NEGATIVE: 1
NEUROLOGICAL NEGATIVE: 1
ABDOMINAL PAIN: 0
VOMITING: 0
NAUSEA: 0
DIARRHEA: 0
CARDIOVASCULAR NEGATIVE: 1

## 2024-05-14 ASSESSMENT — PAIN SCALES - GENERAL
PAINLEVEL_OUTOF10: 0 - NO PAIN

## 2024-05-14 ASSESSMENT — PAIN - FUNCTIONAL ASSESSMENT
PAIN_FUNCTIONAL_ASSESSMENT: 0-10

## 2024-05-14 ASSESSMENT — COLUMBIA-SUICIDE SEVERITY RATING SCALE - C-SSRS
2. HAVE YOU ACTUALLY HAD ANY THOUGHTS OF KILLING YOURSELF?: NO
1. IN THE PAST MONTH, HAVE YOU WISHED YOU WERE DEAD OR WISHED YOU COULD GO TO SLEEP AND NOT WAKE UP?: NO
6. HAVE YOU EVER DONE ANYTHING, STARTED TO DO ANYTHING, OR PREPARED TO DO ANYTHING TO END YOUR LIFE?: NO

## 2024-05-14 NOTE — ANESTHESIA POSTPROCEDURE EVALUATION
Patient: Ayanna Willard    Procedure Summary       Date: 05/14/24 Room / Location: [facility]    Anesthesia Start: 0750 Anesthesia Stop: 0821    Procedure: EGD Diagnosis:       Bariatric surgery status      BMI 38.0-38.9,adult    Scheduled Providers: Crow Navas MD; Feng Sandhu RN Responsible Provider: Shira Salamanca MD    Anesthesia Type: MAC ASA Status: 3            Anesthesia Type: MAC    Vitals Value Taken Time   /82 05/14/24 0815   Temp 37.2 °C (99 °F) 05/14/24 0815   Pulse 91 05/14/24 0815   Resp 18 05/14/24 0815   SpO2 98 % 05/14/24 0815       Anesthesia Post Evaluation    Patient location during evaluation: PACU  Patient participation: complete - patient participated  Level of consciousness: awake  Pain management: adequate  Airway patency: patent  Cardiovascular status: acceptable  Respiratory status: acceptable  Hydration status: acceptable  Postoperative Nausea and Vomiting: none        There were no known notable events for this encounter.

## 2024-05-14 NOTE — H&P
History Of Present Illness  Ayanna Willard is a 48 y.o. female here for EGD prior to sleeve gastrectomy.      Past Medical History  Past Medical History:   Diagnosis Date    Atrial fibrillation (Multi)     Encounter for screening for malignant neoplasm of cervix 2016    Pap smear for cervical cancer screening    GERD (gastroesophageal reflux disease)     History of bilateral mastectomy 2022    Other specified health status     Medical history non-contributory    Sleep apnea        Surgical History  Past Surgical History:   Procedure Laterality Date     SECTION, CLASSIC       Section     MR PELVIS ANGIO W IV CONTRAST  2016    MR PELVIS ANGIO W IV CONTRAST 2016 [facility]LL[facility]    OTHER SURGICAL HISTORY  2016    Breast Reconstruction With Tissue Expander Bilateral    OTHER SURGICAL HISTORY      Simple Mastectomy Bilateral     OTHER SURGICAL HISTORY      Oophorectomy - Bilat (Removal Of Both Ovaries) Laparoscopic     OTHER SURGICAL HISTORY  2016    Breast Surgery Reconstruction With Free Flap        Social History  She reports that she quit smoking about 15 years ago. Her smoking use included cigarettes. She has never used smokeless tobacco. She reports current alcohol use of about 1.0 standard drink of alcohol per week. She reports that she does not use drugs.    Family History  Family History   Problem Relation Name Age of Onset    Colon cancer Mother      Breast cancer Mother      Breast cancer Sister      Breast cancer Mother's Sister      Colon cancer Mother's Brother      Colon cancer Maternal Grandmother      Other (C SECTION) Other FAM HX     Oophorectomy Other FAM HX         Allergies  Patient has no known allergies.    Review of Systems   Constitutional: Negative.    HENT: Negative.     Eyes: Negative.    Respiratory: Negative.     Cardiovascular: Negative.    Gastrointestinal: Negative.  Negative for abdominal distention, abdominal pain,  "constipation, diarrhea, nausea and vomiting.   Endocrine: Negative.    Genitourinary: Negative.    Musculoskeletal: Negative.    Skin: Negative.    Allergic/Immunologic: Negative.    Neurological: Negative.    Hematological: Negative.    Psychiatric/Behavioral: Negative.          Physical Exam  Vitals reviewed.   Constitutional:       Appearance: Normal appearance. She is obese.   HENT:      Head: Normocephalic and atraumatic.      Nose: Nose normal.   Eyes:      Extraocular Movements: Extraocular movements intact.      Pupils: Pupils are equal, round, and reactive to light.   Cardiovascular:      Rate and Rhythm: Normal rate and regular rhythm.   Pulmonary:      Effort: Pulmonary effort is normal.   Abdominal:      General: Abdomen is flat. There is no distension.      Palpations: Abdomen is soft. There is no mass.      Tenderness: There is no abdominal tenderness.   Musculoskeletal:         General: Normal range of motion.      Cervical back: Normal range of motion and neck supple.   Skin:     General: Skin is warm and dry.      Capillary Refill: Capillary refill takes less than 2 seconds.   Neurological:      General: No focal deficit present.      Mental Status: She is alert and oriented to person, place, and time.   Psychiatric:         Mood and Affect: Mood normal.         Behavior: Behavior normal.         Thought Content: Thought content normal.         Judgment: Judgment normal.       Last Recorded Vitals  Blood pressure (!) 133/98, pulse 86, temperature 36 °C (96.8 °F), temperature source Temporal, resp. rate 16, height 1.651 m (5' 5\"), weight 106 kg (233 lb), SpO2 98%.     Assessment/Plan   Active Problems:  There are no active Hospital Problems.    48F here for EGD prior to planned sleeve gastrectomy.     -NPO, IVF  -Consented  -outpatient      Lalita Roldan MD    "

## 2024-05-14 NOTE — ANESTHESIA PREPROCEDURE EVALUATION
Patient: Ayanna Willard    Procedure Information       Date/Time: 05/14/24 0730    Scheduled providers: Crow Navas MD; Feng Sandhu RN    Procedure: EGD    [facility]: [facility]            Relevant Problems   Cardiac   (+) Afib (Multi)   (+) Atypical chest pain      Pulmonary   (+) Obstructive sleep apnea syndrome      GI   (+) Gastroesophageal reflux disease   (+) Gastroesophageal reflux disease with esophagitis      /Renal   (+) Lower urinary tract infectious disease      Liver   (+) Fatty liver      Endocrine   (+) Class 2 obesity with alveolar hypoventilation, serious comorbidity, and body mass index (BMI) of 38.0 to 38.9 in adult (Multi)   (+) Hypothyroidism   (+) Obesity, unspecified      Hematology   (+) Anemia      HEENT   (+) Sinusitis      ID   (+) Bacterial vaginosis   (+) Herpes genitalia   (+) Lower urinary tract infectious disease      Skin   (+) Eczema      GYN   (+) Familial cancer of breast (Multi)       Clinical information reviewed:   Tobacco  Allergies  Meds   Med Hx  Surg Hx   Fam Hx  Soc Hx        NPO Detail:  NPO/Void Status  Date of Last Liquid: 05/13/24  Time of Last Liquid: 2230  Date of Last Solid: 05/13/24  Time of Last Solid: 2230         Physical Exam    Airway  Mallampati: III  TM distance: >3 FB  Neck ROM: full     Cardiovascular    Dental - normal exam     Pulmonary    Abdominal            Anesthesia Plan    History of general anesthesia?: yes  History of complications of general anesthesia?: no    ASA 3     MAC     intravenous induction   Anesthetic plan and risks discussed with patient.

## 2024-05-20 LAB
LABORATORY COMMENT REPORT: NORMAL
PATH REPORT.FINAL DX SPEC: NORMAL
PATH REPORT.GROSS SPEC: NORMAL
PATH REPORT.TOTAL CANCER: NORMAL

## 2024-05-21 ENCOUNTER — DOCUMENTATION (OUTPATIENT)
Dept: SURGERY | Facility: HOSPITAL | Age: 49
End: 2024-05-21
Payer: COMMERCIAL

## 2024-05-22 ENCOUNTER — DOCUMENTATION (OUTPATIENT)
Dept: SURGERY | Facility: HOSPITAL | Age: 49
End: 2024-05-22
Payer: COMMERCIAL

## 2024-05-22 NOTE — PROGRESS NOTES
Pt sent a [employer] messade regarding pending clearance. [employer] message sent to respond.

## 2024-05-22 NOTE — PROGRESS NOTES
"Letter of Medical Necessity    24      ATTN: Pre-Authorization  Department           Tentative Surgery Date:  24    RE: Ayanna Willard    : 1975         BMI: 38.77    Weight: 233 lb      Height:  5'5\"    Ms. Ayanna Willard suffers from multiple health conditions including Morbid Obesity (E66.01). An extensive clinical evaluation has been completed and the final recommendation has been provided to Ms. Willard; explanation of short and long term surgical risks vs. benefits has been reviewed at length. Ms. Willard has verbalized an understanding of the associated risks, has agreed to comply with behavioral and lifestyle changes that support a successful post-surgical outcome, resolution of comorbid conditions and improvement in quality of life.    The patient suffers from the following health conditions:   Patient Active Problem List   Diagnosis    Abdominal pain, LLQ (left lower quadrant)    Abnormal urine findings    Afib (Multi)    Atypical chest pain    Bacterial vaginosis    Closed nondisplaced fracture of fourth metatarsal bone of right foot    Closed nondisplaced fracture of second metatarsal bone of right foot    Closed nondisplaced fracture of third metatarsal bone of right foot    Dense breast    Decreased range of motion of right ankle    Eczema    Fatigue    Fatty liver    Fibrocystic breast    Fracture of foot    Gastroesophageal reflux disease    Gastroesophageal reflux disease with esophagitis    Genetic susceptibility to malignant neoplasm of breast    Herpes genitalia    Impaired fasting glucose    Impaired strength of lower extremity    Increased frequency of urination    Left wrist pain    Right wrist pain    Lower urinary tract infectious disease    Lumbar sprain    Nasal congestion    Neck pain    Obesity, unspecified    Obstructive sleep apnea syndrome    Other disorders of intestinal carbohydrate absorption    Polyphagia    Prehypertension    Right foot pain    Joint pain, foot    " Right shoulder pain    S/P breast reconstruction    Sinusitis    Sore throat    Sprain of ankle    Sprain of ligament of tarsometatarsal joint, right, initial encounter    Tachycardia    Tenosynovitis of left wrist    Vaginitis    Vitamin D deficiency    Anemia    Familial cancer of breast (Multi)    Hypothyroidism    Vitamin B12 deficiency    LLQ pain    Lower abdominal pain    Problems related to inappropriate diet and eating habits    Pre-operative clearance    History of bariatric surgery    History of colonic polyps    Routine general medical examination at a health care facility    Class 2 obesity with alveolar hypoventilation, serious comorbidity, and body mass index (BMI) of 38.0 to 38.9 in adult (Multi)         We kindly request careful review of the following documents: Surgeon's consultation with weight related comorbidities and diagnoses, primary care support letter, psychiatric evaluation,  nutrition assessment and other pertinent information required by the member's plan.      Ms. Willard has attended our pre-operative educational programs and verbalizes that she has an understanding of the behaviors and choices necessary to be successful with bariatric surgery for a lifetime. Ms. Willard further understands that in order to be successful she must attend post-operative visits at 1week, 6 weeks, 3 months, 6 months, 12 months, and annually to review her progress and consult with our registered dietitian. In addition Ms. Willard agrees to attend monthly support group meetings hosted by our licensed program staff to further enhance her chances of successful lifetime weight loss. she has agreed to participate in exercise 5 days per week as tolerated and has already been advised  to increase her physical activity in preparation for surgery.              We kindly request review of prior-authorization for a 1 to 2 day hospital stay for procedure Laparoscopic sleeve gastrectomy  [id number] with Dr. Crow Navas   NPI:[id number]  TID: [id number] at [facility]. Tentative surgery date 07/22/24.    Please fax your approval or requests for  additional information to the attention of Thuy Villela, Patient Navigator at [phone], this is a secured fax line.    We sincerely appreciate your thoughtful review of this case.      Sincerely,  Dr. Crow Navas  [facility]    Enclosures

## 2024-05-28 ENCOUNTER — DOCUMENTATION (OUTPATIENT)
Dept: SURGERY | Facility: HOSPITAL | Age: 49
End: 2024-05-28
Payer: COMMERCIAL

## 2024-05-28 NOTE — PROGRESS NOTES
Additional information request received last Thursday, and was sent to the psych provider, and the provider is now requesting payer information. Response sent.

## 2024-05-29 DIAGNOSIS — K21.9 GASTROESOPHAGEAL REFLUX DISEASE WITHOUT ESOPHAGITIS: ICD-10-CM

## 2024-05-29 RX ORDER — PANTOPRAZOLE SODIUM 40 MG/1
40 TABLET, DELAYED RELEASE ORAL DAILY
Qty: 90 TABLET | Refills: 1 | Status: SHIPPED | OUTPATIENT
Start: 2024-05-29

## 2024-05-29 NOTE — TELEPHONE ENCOUNTER
Last OV 4/1/24     Requested Prescriptions     Pending Prescriptions Disp Refills    pantoprazole (ProtoNix) 40 mg EC tablet [Pharmacy Med Name: PANTOPRAZOLE SOD DR 40 MG TAB] 30 tablet 5     Sig: TAKE 1 TABLET (40 MG) BY MOUTH DAILY DO NOT CRUSH, CHEW, ORSPLIT

## 2024-05-30 ENCOUNTER — DOCUMENTATION (OUTPATIENT)
Dept: SURGERY | Facility: HOSPITAL | Age: 49
End: 2024-05-30
Payer: COMMERCIAL

## 2024-05-30 NOTE — PROGRESS NOTES
Reached out to payer re: additional information request sent to us to gain clarification.    I called [phone] and spoke with Sukh. I was able to locate forms while on hold, but was advised they were also being faxed over to me.     Forms completed to my ability and then sent to Sherry for clinical portion completion and patient completion.     Psychosocial form uploaded for Dr. Márquez so she could complete that portion as well.     Pending auth # is [id number]

## 2024-06-04 ENCOUNTER — PATIENT MESSAGE (OUTPATIENT)
Dept: SURGERY | Facility: HOSPITAL | Age: 49
End: 2024-06-04
Payer: COMMERCIAL

## 2024-06-06 ENCOUNTER — NUTRITION (OUTPATIENT)
Dept: SURGERY | Facility: CLINIC | Age: 49
End: 2024-06-06
Payer: COMMERCIAL

## 2024-06-06 ENCOUNTER — DOCUMENTATION (OUTPATIENT)
Dept: SURGERY | Facility: HOSPITAL | Age: 49
End: 2024-06-06

## 2024-06-06 VITALS — WEIGHT: 230 LBS | BODY MASS INDEX: 38.27 KG/M2

## 2024-06-06 DIAGNOSIS — E66.9 OBESITY, CLASS II, BMI 35-39.9: ICD-10-CM

## 2024-06-06 DIAGNOSIS — Z98.84 BARIATRIC SURGERY STATUS: Primary | ICD-10-CM

## 2024-06-06 NOTE — PROGRESS NOTES
PROGRESS:  Recommendations (4/29/24)  1.          Record what you eat daily into the [employer] tracy on your phone. Email me your foods logs weekly for review and feedback.  2.          Continue to eat 3 meals/day, 1-2 snacks as needed.  3.          Continue to drink 64oz water daily.  4.          Continue to practice no drinking 30 minutes before meals, nothing with meals and wait 30 minutes after meals to drink again. Make meals last 30 minutes-chew thoroughly.   5.          Continue to exercise for 30-60 minutes 5 day/week. Consistency is the key.    Patient Meeting Goals: PARTIALLY        ASSESSMENT:  Current weight:   230 lbs    Ht: 65 in   BMI:   Previous weight: 233 lbs        Initial start weight:  233 lbs      24 HOUR RECALL/DIET HISTORY:  Breakfast:  cereal OR turkey sausage, eggs, grits  Snack:    Lunch: salmon, broccoli, and green beans  Snack: orange   Dinner: salmon, broccoli, green beans   Snack:   Beverages: water, CL, tea   Alcohol: none       Exercise: 3 times/week at the gym - treadmill and weights   Vitamins/minerals: vitamin D weekly, B12 monthly, vitamin E, probiotic daily , MVI        READINESS TO LEARN:  Motivation to learn: Interested        Understanding of instruction: Good    Anticipated Compliance: Good       Family Support: Unable to assess-family not present          Educational Materials Provided:  Preop diet handout and NG booklet         Patient presents with excessive calorie obesity seeking  sleeve gastrectomy.    Patient reports she is eating 3 meals/day, snacks have been mostly fruit. Limiting bread/pasta/rice. Patient has increased exercise, added ST. Patient is drinking water and CL mostly. Practicing postop behaviors. Patient feels like she is doing well, confident to continue with regimen while she waits to get her surgery date.   Reviewed 2 week preop diet. Pt plans to drink Premier protein drinks. Reviewed 2 week full liquids upon coming home, reminded to record all PO  intake of fluids and protein daily. Reminded to take small sips and walk around every hour. Reviewed postop vitamin/mineral regimen. Encouraged monthly SG meetings. Pt encouraged to reach out to RD for any questions/concerns.         Malnutrition Screening:  Significant unintentional weight loss? No  Eating less than 75% of usual intake for more than 2 weeks? No      Nutrition Diagnosis:   1. Overweight/obesity related to excess energy intake as evidenced by BMI = 40 kg/m^2.  2. Food- and nutrition-related knowledge deficit related to lack of prior exposure to surgical weight loss information as evidenced by pt new to surgical program.    Nutrition Interventions:   1. Modify type and amount of food and nutrients within meals and snacks.  2. Comprehensive Nutrition Education    Recommendations:  1. Continue to eat 3 meals/day, 1-2 snacks as needed.  2. Continue to drink 64oz water daily.  3. Practice no drinking 30 minutes before meals, nothing with meals and wait 30 minutes after meals to drink again. Make meals last 30 minutes-chew thoroughly.   4. Increase physical activity by 10-15 minutes as tolerated to an end goal of 60 minutes 5 x per week. Consistency is the key.  5. Start your preop diet 2 weeks before your surgery date.   6. Attend SG meetings monthly after surgery.    Pre-op Goal weight: lose 5% of body weight    Nutrition Monitoring and Evaluation: 1-2 pound weight loss per week  Criteria: weight check  Need for Follow-up: Postop

## 2024-06-06 NOTE — PROGRESS NOTES
Received pt's portion for additional info request. Willi portion also in scanned docs, just need provider portion

## 2024-06-10 ENCOUNTER — DOCUMENTATION (OUTPATIENT)
Dept: SURGERY | Facility: HOSPITAL | Age: 49
End: 2024-06-10
Payer: COMMERCIAL

## 2024-06-10 NOTE — PROGRESS NOTES
Received another fax back asking me to provide pt name,  and subscriber ID for HIPAA instead of prior auth #. Information sent

## 2024-06-11 ENCOUNTER — DOCUMENTATION (OUTPATIENT)
Dept: SURGERY | Facility: HOSPITAL | Age: 49
End: 2024-06-11
Payer: COMMERCIAL

## 2024-06-11 NOTE — PROGRESS NOTES
Received surgical approval. Patient was contacted and the medical team was advised. Auth uploaded

## 2024-06-18 DIAGNOSIS — Z98.84 BARIATRIC SURGERY STATUS: Primary | ICD-10-CM

## 2024-06-18 DIAGNOSIS — E66.01 MORBID OBESITY (MULTI): ICD-10-CM

## 2024-06-18 DIAGNOSIS — E66.01 MORBID OBESITY (MULTI): Primary | ICD-10-CM

## 2024-06-18 DIAGNOSIS — Z01.818 PREOPERATIVE CLEARANCE: ICD-10-CM

## 2024-06-24 ENCOUNTER — DOCUMENTATION (OUTPATIENT)
Dept: SURGERY | Facility: HOSPITAL | Age: 49
End: 2024-06-24
Payer: COMMERCIAL

## 2024-06-27 PROBLEM — E66.01 MORBID OBESITY (MULTI): Status: ACTIVE | Noted: 2024-06-18

## 2024-07-11 ENCOUNTER — PATIENT MESSAGE (OUTPATIENT)
Dept: SURGERY | Facility: HOSPITAL | Age: 49
End: 2024-07-11
Payer: COMMERCIAL

## 2024-07-25 ENCOUNTER — DOCUMENTATION (OUTPATIENT)
Dept: SURGERY | Facility: HOSPITAL | Age: 49
End: 2024-07-25
Payer: COMMERCIAL

## 2024-08-02 ENCOUNTER — TELEPHONE (OUTPATIENT)
Dept: SURGERY | Facility: CLINIC | Age: 49
End: 2024-08-02
Payer: COMMERCIAL

## 2024-08-02 NOTE — TELEPHONE ENCOUNTER
Call out to patient to check in as she awaits her surgery date. Patient reports she has been doing well with losing more weight since LV. Reports things are going well.    Email and [employer] message sent to patient with updated NG booklet and preop diet handout. Reviewed 2 week preop diet. Reviewed 2 week full liquids upon coming home, reminded to record all PO intake of fluids and protein daily. Reminded to take small sips and walk around every hour. Reviewed postop vitamin/mineral regimen. Encouraged monthly SG meetings. Pt encouraged to reach out to RD for any questions/concerns.

## 2024-08-08 ENCOUNTER — DOCUMENTATION (OUTPATIENT)
Dept: SURGERY | Facility: HOSPITAL | Age: 49
End: 2024-08-08
Payer: COMMERCIAL

## 2024-08-08 NOTE — PROGRESS NOTES
I called [phone] provider services, and spoke with Mary and she updated the DOS to 09/23/2024. She advised that due to date being within auth date range there was no further action required, but date was noted. Call ref# Mary [id number] 08/08/2024 at 7:42 am (pacific-her time).

## 2024-08-21 ENCOUNTER — DOCUMENTATION (OUTPATIENT)
Dept: SURGERY | Facility: HOSPITAL | Age: 49
End: 2024-08-21
Payer: COMMERCIAL

## 2024-08-21 NOTE — PROGRESS NOTES
Surgical auth information updated to patients hospital encounter in Epic at the request of Chiquis.

## 2024-08-30 ENCOUNTER — CLINICAL SUPPORT (OUTPATIENT)
Dept: PREADMISSION TESTING | Facility: HOSPITAL | Age: 49
End: 2024-08-30
Payer: COMMERCIAL

## 2024-08-30 NOTE — CPM/PAT NURSE NOTE
CPM/PAT Nurse Note      Name: Ayanna Willard (Ayanna Willard)  /Age: 1975/48 y.o.       Past Medical History:   Diagnosis Date    Arrhythmia     paroxysmal atrial fibrillation x1    Atrial fibrillation (Multi)     Breast cancer (Multi)     Fatty liver     GERD (gastroesophageal reflux disease)     Herpes genitalia     History of bilateral mastectomy 2022    Obesity     Sleep apnea     Vitamin D deficiency        Past Surgical History:   Procedure Laterality Date     SECTION, CLASSIC       Section     COLONOSCOPY      ESOPHAGOGASTRODUODENOSCOPY      MASTECTOMY Bilateral     MR PELVIS ANGIO W IV CONTRAST  2016    MR PELVIS ANGIO W IV CONTRAST 2016 [facility]    OOPHORECTOMY Bilateral     OTHER SURGICAL HISTORY  2016    Breast Reconstruction With Tissue Expander Bilateral    OTHER SURGICAL HISTORY  2016    Breast Surgery Reconstruction With Free Flap       Patient  has no history on file for sexual activity.    Family History   Problem Relation Name Age of Onset    Colon cancer Mother      Breast cancer Mother      Breast cancer Sister      Breast cancer Mother's Sister      Colon cancer Mother's Brother      Colon cancer Maternal Grandmother      Other (C SECTION) Other FAM HX     Oophorectomy Other FAM HX        No Known Allergies    Prior to Admission medications    Medication Sig Start Date End Date Taking? Authorizing Provider   cyanocobalamin (Vitamin B-12) 1,000 mcg/mL injection INJECT 1 ML (1,000 MCG) INTO THE MUSCLE EVERY 30 (THIRTY) DAYS. 24   Macho Hicks MD   ergocalciferol (Vitamin D-2) 1.25 MG (75283 UT) capsule Take 1 capsule (50,000 Units) by mouth 1 (one) time per week. 24   Macho Hicks MD   estradioL 10 mcg insert, dose pack Insert vaginally daily for 14 days followed by 2 times/week 24   Adam Carl APRN-CNP   metoprolol succinate XL (Toprol-XL) 25 mg 24 hr tablet Take 1 tablet (25 mg) by mouth once  daily. 1/17/24 1/16/25  Martinez Garg MD   oxybutynin XL (Ditropan-XL) 5 mg 24 hr tablet Take 1 tablet (5 mg) by mouth once daily. Do not crush, chew, or split.  Patient not taking: Reported on 5/14/2024 4/29/24 4/29/25  Adam Carl, APRN-CNP   pantoprazole (ProtoNix) 40 mg EC tablet TAKE 1 TABLET (40 MG) BY MOUTH DAILY DO NOT CRUSH, CHEW, ORSPLIT 5/29/24   Macho Hicks MD        PAT ROS     DASI Risk Score    No data to display       Caprini DVT Assessment    No data to display       Modified Frailty Index    No data to display       CHADS2 Stroke Risk  Current as of 3 minutes ago        1.9% 3 to 100%: High Risk   2 to < 3%: Medium Risk   0 to < 2%: Low Risk     No Change          This score determines the patient's risk of having a stroke if the patient has atrial fibrillation.          Points Metrics   0 Has Congestive Heart Failure:  No     Patients with congestive heart failure get 1 point.    Current as of 3 minutes ago   0 Has Hypertension:  No     Patients with hypertension get 1 point.    Current as of 3 minutes ago   0 Age:  48     Patients who are 75 years of age or older get 1 point.    Current as of 3 minutes ago   0 Has Diabetes:  No     Patients with diabetes get 1 point.    Current as of 3 minutes ago   0 Had Stroke:  No  Had TIA:  No  Had Thromboembolism:  No     Patients who have had a stroke, TIA, or thromboembolism get 2 points.    Current as of 3 minutes ago             Revised Cardiac Risk Index    No data to display       Apfel Simplified Score    No data to display       Risk Analysis Index Results This Encounter    No data found in the last 1 encounters.     Scheduled for laparoscopic gastric sleeve with Dr. Navas and Dr. Shaw on 9/23/24.  PCP: Macho Hicks MD LOV 4/1/24    Sleep Medicine: Hollie Clifton MD LOV 5/10/24 seen for DARBY.    Per Note: Patient PAP report reviewed and shows excellent adherence. From an DARBY management perspective, she is at low risk of donovan-operative  complications after bariatric surgery with continued use of CPAP for her DARBY.   OBGYN: Adam Carl, APRN- CNP LOV 4/29/24 seen for menopause.  Behavioral Health: Shani Márquez, PHD LOV 4/12/24 seen to assess readiness for bariatric surgery.     Per note: Follow up: post surgery. Ayanna is cleared for bariatric surgery from a behavioral            health perspective.    Cardiology: Martinez Garg MD LOV 1/17/24 follow up visit for A-Fib and pre-op clearance for bariatric surgery.  Per note: Preop CV risk assessment  She is pending bariatric surgery. She is considered a low risk for perioperative cardiovascular events. No further cardiac testing recommended. No cardiac barriers to surgery.     -ECG; 1/17/24  Normal sinus rhythm  Nonspecific intraventricular block  Abnormal ECG    -Transthoracic Echo; 12/19/2017  CONCLUSIONS:   1. The left ventricular systolic function is normal with a 55-60% estimated ejection fraction.    General Surgery: Crow Navas MD LOV 1/9/24 seen for consideration of bariatric surgery.   Gastroenterology: Nasim Taylor MD LOV 10/4/23 seen for left lower quadrant pain. CT scan showed possible inflammatory changes in the sigmoid and possible thickening in the cecum. Plan: Colonoscopy. Scheduled for 10/2024.    Nurse Plan of Action:    ONLY    Khloe Lopez RN  Pre-Admission Testing

## 2024-09-06 ENCOUNTER — HOSPITAL ENCOUNTER (OUTPATIENT)
Dept: RADIOLOGY | Facility: HOSPITAL | Age: 49
Discharge: HOME | End: 2024-09-06
Payer: COMMERCIAL

## 2024-09-06 ENCOUNTER — PRE-ADMISSION TESTING (OUTPATIENT)
Dept: PREADMISSION TESTING | Facility: HOSPITAL | Age: 49
End: 2024-09-06
Payer: COMMERCIAL

## 2024-09-06 VITALS
WEIGHT: 238.1 LBS | DIASTOLIC BLOOD PRESSURE: 97 MMHG | HEIGHT: 65 IN | BODY MASS INDEX: 39.67 KG/M2 | TEMPERATURE: 96.9 F | HEART RATE: 81 BPM | OXYGEN SATURATION: 98 % | SYSTOLIC BLOOD PRESSURE: 130 MMHG

## 2024-09-06 DIAGNOSIS — Z98.84 BARIATRIC SURGERY STATUS: ICD-10-CM

## 2024-09-06 DIAGNOSIS — Z01.818 PREOPERATIVE CLEARANCE: ICD-10-CM

## 2024-09-06 LAB
ABO GROUP (TYPE) IN BLOOD: NORMAL
ALBUMIN SERPL BCP-MCNC: 4.6 G/DL (ref 3.4–5)
ALP SERPL-CCNC: 63 U/L (ref 33–110)
ALT SERPL W P-5'-P-CCNC: 43 U/L (ref 7–45)
ANION GAP SERPL CALC-SCNC: 15 MMOL/L (ref 10–20)
ANTIBODY SCREEN: NORMAL
APPEARANCE UR: CLEAR
AST SERPL W P-5'-P-CCNC: 22 U/L (ref 9–39)
BASOPHILS # BLD AUTO: 0.04 X10*3/UL (ref 0–0.1)
BASOPHILS NFR BLD AUTO: 0.7 %
BILIRUB SERPL-MCNC: 0.5 MG/DL (ref 0–1.2)
BILIRUB UR STRIP.AUTO-MCNC: NEGATIVE MG/DL
BUN SERPL-MCNC: 13 MG/DL (ref 6–23)
CALCIUM SERPL-MCNC: 9.1 MG/DL (ref 8.6–10.6)
CHLORIDE SERPL-SCNC: 104 MMOL/L (ref 98–107)
CO2 SERPL-SCNC: 25 MMOL/L (ref 21–32)
COLOR UR: NORMAL
CREAT SERPL-MCNC: 0.82 MG/DL (ref 0.5–1.05)
EGFRCR SERPLBLD CKD-EPI 2021: 89 ML/MIN/1.73M*2
EOSINOPHIL # BLD AUTO: 0.08 X10*3/UL (ref 0–0.7)
EOSINOPHIL NFR BLD AUTO: 1.5 %
ERYTHROCYTE [DISTWIDTH] IN BLOOD BY AUTOMATED COUNT: 13.9 % (ref 11.5–14.5)
GLUCOSE SERPL-MCNC: 89 MG/DL (ref 74–99)
GLUCOSE UR STRIP.AUTO-MCNC: NORMAL MG/DL
HCT VFR BLD AUTO: 44.8 % (ref 36–46)
HGB BLD-MCNC: 14.5 G/DL (ref 12–16)
IMM GRANULOCYTES # BLD AUTO: 0.01 X10*3/UL (ref 0–0.7)
IMM GRANULOCYTES NFR BLD AUTO: 0.2 % (ref 0–0.9)
INR PPP: 1 (ref 0.9–1.1)
KETONES UR STRIP.AUTO-MCNC: NEGATIVE MG/DL
LEUKOCYTE ESTERASE UR QL STRIP.AUTO: NEGATIVE
LYMPHOCYTES # BLD AUTO: 2.09 X10*3/UL (ref 1.2–4.8)
LYMPHOCYTES NFR BLD AUTO: 38.5 %
MCH RBC QN AUTO: 27.5 PG (ref 26–34)
MCHC RBC AUTO-ENTMCNC: 32.4 G/DL (ref 32–36)
MCV RBC AUTO: 85 FL (ref 80–100)
MONOCYTES # BLD AUTO: 0.49 X10*3/UL (ref 0.1–1)
MONOCYTES NFR BLD AUTO: 9 %
NEUTROPHILS # BLD AUTO: 2.72 X10*3/UL (ref 1.2–7.7)
NEUTROPHILS NFR BLD AUTO: 50.1 %
NITRITE UR QL STRIP.AUTO: NEGATIVE
NRBC BLD-RTO: 0 /100 WBCS (ref 0–0)
PH UR STRIP.AUTO: 6.5 [PH]
PLATELET # BLD AUTO: 264 X10*3/UL (ref 150–450)
POTASSIUM SERPL-SCNC: 3.9 MMOL/L (ref 3.5–5.3)
PROT SERPL-MCNC: 7 G/DL (ref 6.4–8.2)
PROT UR STRIP.AUTO-MCNC: NEGATIVE MG/DL
PROTHROMBIN TIME: 11.4 SECONDS (ref 9.8–12.8)
RBC # BLD AUTO: 5.28 X10*6/UL (ref 4–5.2)
RBC # UR STRIP.AUTO: NEGATIVE /UL
RH FACTOR (ANTIGEN D): NORMAL
SODIUM SERPL-SCNC: 140 MMOL/L (ref 136–145)
SP GR UR STRIP.AUTO: 1.02
UROBILINOGEN UR STRIP.AUTO-MCNC: NORMAL MG/DL
WBC # BLD AUTO: 5.4 X10*3/UL (ref 4.4–11.3)

## 2024-09-06 PROCEDURE — 80053 COMPREHEN METABOLIC PANEL: CPT

## 2024-09-06 PROCEDURE — 99204 OFFICE O/P NEW MOD 45 MIN: CPT | Performed by: NURSE PRACTITIONER

## 2024-09-06 PROCEDURE — 85610 PROTHROMBIN TIME: CPT

## 2024-09-06 PROCEDURE — 85025 COMPLETE CBC W/AUTO DIFF WBC: CPT

## 2024-09-06 PROCEDURE — 71046 X-RAY EXAM CHEST 2 VIEWS: CPT

## 2024-09-06 PROCEDURE — 36415 COLL VENOUS BLD VENIPUNCTURE: CPT

## 2024-09-06 PROCEDURE — 80323 ALKALOIDS NOS: CPT

## 2024-09-06 PROCEDURE — 86901 BLOOD TYPING SEROLOGIC RH(D): CPT

## 2024-09-06 PROCEDURE — 81003 URINALYSIS AUTO W/O SCOPE: CPT

## 2024-09-06 ASSESSMENT — DUKE ACTIVITY SCORE INDEX (DASI)
CAN YOU PARTICIPATE IN MODERATE RECREATIONAL ACTIVITIES LIKE GOLF, BOWLING, DANCING, DOUBLES TENNIS OR THROWING A BASEBALL OR FOOTBALL: YES
CAN YOU DO LIGHT WORK AROUND THE HOUSE LIKE DUSTING OR WASHING DISHES: YES
CAN YOU DO MODERATE WORK AROUND THE HOUSE LIKE VACUUMING, SWEEPING FLOORS OR CARRYING GROCERIES: YES
CAN YOU RUN A SHORT DISTANCE: YES
CAN YOU CLIMB A FLIGHT OF STAIRS OR WALK UP A HILL: YES
TOTAL_SCORE: 58.2
CAN YOU DO HEAVY WORK AROUND THE HOUSE LIKE SCRUBBING FLOORS OR LIFTING AND MOVING HEAVY FURNITURE: YES
CAN YOU PARTICIPATE IN STRENOUS SPORTS LIKE SWIMMING, SINGLES TENNIS, FOOTBALL, BASKETBALL, OR SKIING: YES
CAN YOU HAVE SEXUAL RELATIONS: YES
CAN YOU WALK INDOORS, SUCH AS AROUND YOUR HOUSE: YES
DASI METS SCORE: 9.9
CAN YOU DO YARD WORK LIKE RAKING LEAVES, WEEDING OR PUSHING A MOWER: YES
CAN YOU WALK A BLOCK OR TWO ON LEVEL GROUND: YES
CAN YOU TAKE CARE OF YOURSELF (EAT, DRESS, BATHE, OR USE TOILET): YES

## 2024-09-06 ASSESSMENT — ENCOUNTER SYMPTOMS
ENDOCRINE NEGATIVE: 1
RESPIRATORY NEGATIVE: 1
CARDIOVASCULAR NEGATIVE: 1
NECK NEGATIVE: 1
GASTROINTESTINAL NEGATIVE: 1
MUSCULOSKELETAL NEGATIVE: 1
NEUROLOGICAL NEGATIVE: 1
EYES NEGATIVE: 1
CONSTITUTIONAL NEGATIVE: 1

## 2024-09-06 ASSESSMENT — LIFESTYLE VARIABLES: SMOKING_STATUS: NONSMOKER

## 2024-09-06 NOTE — PREPROCEDURE INSTRUCTIONS
Fasting Guidelines    Why must I stop eating and drinking near surgery time?  With sedation, food or liquid in your stomach can enter your lungs causing serious complications  Increases nausea and vomiting    When do I need to stop eating and drinking before my surgery?  Do not eat any food or drink any liquids after midnight the night before your surgery/procedure.  You may have sips of water to take medications.    Additional Instructions:     Avoid herbal supplements, multivitamins and NSAIDS (non-steroidal anti-inflammatory drugs) such as Advil, Aleve, Ibuprofen, Naproxen, Excedrin, Meloxicam or Celebrex for at least 7 days prior to surgery. May take Tylenol as needed.    Avoid tobacco and alcohol products for 24 hours prior to surgery.    Please bring CPAP/BIPAP with you the day of surgery.     CONTACT SURGEON'S OFFICE IF YOU DEVELOP:  * Fever = 100.4 F   * New respiratory symptoms (e.g. cough, shortness of breath, respiratory distress, sore throat)  * Recent loss of taste or smell  *Flu like symptoms such as headache, fatigue or gastrointestinal symptoms  * You develop any open sores, shingles, burning or painful urination   AND/OR:  * You no longer wish to have the surgery.  * Any other personal circumstances change that may lead to the need to cancel or defer this surgery.  *You were admitted to any hospital within one week of your planned procedure.    Seven/Six Days before Surgery:  Review your medication instructions, stop indicated medications    Day of Surgery:  Review your medication instructions, take indicated medications  Wear comfortable loose fitting clothing  Do not use moisturizers, creams, lotions or perfume  All jewelry and valuables should be left at home    [facility]  [facility]  [phone]  [phone]  [email or web address]      Preoperative Brain Exercises    What are brain exercises?  A brain exercise is any activity that  engages your thinking (cognitive) skills.    What types of activities are considered brain exercises?  Jigsaw puzzles, crossword puzzles, word jumble, memory games, word search, and many more.  Many can be found free online or on your phone via a mobile tracy.    Why should I do brain exercises before my surgery?  More recent research has shown brain exercise before surgery can lower the risk of postoperative delirium (confusion) which can be especially important for older adults.  Patients who did brain exercises for 5 to 10 hours the days before surgery, cut their risk of postoperative delirium in half up to 1 week after surgery.         [facility]    Preoperative Deep Breathing Exercises    Why it is important to do deep breathing exercises before my surgery?  Deep breathing exercises strengthen your breathing muscles.  This helps you to recover after your surgery and decreases the chance of breathing complications.      How are the deep breathing exercises done?  Sit straight with your back supported.  Breathe in deeply and slowly through your nose. Your lower rib cage should expand and your abdomen may move forward.  Hold that breath for 3 to 5 seconds.  Breathe out through pursed lips, slowly and completely.  Rest and repeat 10 times every hour while awake.  Rest longer if you become dizzy or lightheaded.         Patient and Family Education             Ways You Can Help Prevent Blood Clots             This handout explains some simple things you can do to help prevent blood clots.      Blood clots are blockages that can form in the body's veins. When a blood clot forms in your deep veins, it may be called a deep vein thrombosis, or DVT for short. Blood clots can happen in any part of the body where blood flows, but they are most common in the arms and legs. If a piece of a blood clot breaks free and travels to the lungs, it is called a pulmonary embolus (PE). A PE can be a very  serious problem.         Being in the hospital or having surgery can raise your chances of getting a blood clot because you may not be well enough to move around as much as you normally do.         Ways you can help prevent blood clots in the hospital         Wearing SCDs. SCDs stands for Sequential Compression Devices.   SCDs are special sleeves that wrap around your legs  They attach to a pump that fills them with air to gently squeeze your legs every few minutes.   This helps return the blood in your legs to your heart.   SCDs should only be taken off when walking or bathing.   SCDs may not be comfortable, but they can help save your life.               Wearing compression stockings - if your doctor orders them. These special snug fitting stockings gently squeeze your legs to help blood flow.       Walking. Walking helps move the blood in your legs.   If your doctor says it is ok, try walking the halls at least   5 times a day. Ask us to help you get up, so you don't fall.      Taking any blood thinning medicines your doctor orders.        Page 1 of 2     [facility]; 3/23   Ways you can help prevent blood clots at home       Wearing compression stockings - if your doctor orders them. ? Walking - to help move the blood in your legs.       Taking any blood thinning medicines your doctor orders.      Signs of a blood clot or PE      Tell your doctor or nurse know right away if you have of the problems listed below.    If you are at home, seek medical care right away. Call 911 for chest pain or problems breathing.               Signs of a blood clot (DVT) - such as pain,  swelling, redness or warmth in your arm or leg      Signs of a pulmonary embolism (PE) - such as chest     pain or feeling short of breath

## 2024-09-06 NOTE — PROGRESS NOTES
BARIATRIC SURGERY PREOPERATIVE VISIT    Date: 09/06/24  Time: 1055    Name: Ayanna Willard    MRN: [id number]    This is a 48 y.o. y.o. female with morbid obesity (Body mass index is 39.04 kg/m².) who plans to undergo Laparoscopic sleeve gastrectomy  31543 surgery. They have completed a rigorous preoperative medical work-up and bariatric surgery educational program.     PMH:   Patient Active Problem List   Diagnosis    Abdominal pain, LLQ (left lower quadrant)    Abnormal urine findings    Afib (Multi)    Atypical chest pain    Bacterial vaginosis    Closed nondisplaced fracture of fourth metatarsal bone of right foot    Closed nondisplaced fracture of second metatarsal bone of right foot    Closed nondisplaced fracture of third metatarsal bone of right foot    Dense breast    Decreased range of motion of right ankle    Eczema    Fatigue    Fatty liver    Fibrocystic breast    Fracture of foot    Gastroesophageal reflux disease    Gastroesophageal reflux disease with esophagitis    Genetic susceptibility to malignant neoplasm of breast    Herpes genitalia    Impaired fasting glucose    Impaired strength of lower extremity    Increased frequency of urination    Left wrist pain    Right wrist pain    Lower urinary tract infectious disease    Lumbar sprain    Nasal congestion    Neck pain    Obesity, unspecified    Obstructive sleep apnea syndrome    Other disorders of intestinal carbohydrate absorption    Polyphagia    Prehypertension    Right foot pain    Joint pain, foot    Right shoulder pain    S/P breast reconstruction    Sinusitis    Sore throat    Sprain of ankle    Sprain of ligament of tarsometatarsal joint, right, initial encounter    Tachycardia    Tenosynovitis of left wrist    Vaginitis    Vitamin D deficiency    Anemia    Familial cancer of breast (Multi)    Hypothyroidism    Vitamin B12 deficiency    LLQ pain    Lower abdominal pain    Problems related to inappropriate diet and eating habits     Pre-operative clearance    History of bariatric surgery    History of colonic polyps    Routine general medical examination at a health care facility    Class 2 obesity with alveolar hypoventilation, serious comorbidity, and body mass index (BMI) of 38.0 to 38.9 in adult (Multi)    Morbid obesity (Multi)       PSH:   Past Surgical History:   Procedure Laterality Date     SECTION, CLASSIC       Section     COLONOSCOPY      ESOPHAGOGASTRODUODENOSCOPY      MASTECTOMY Bilateral 2016    MR PELVIS ANGIO W IV CONTRAST  2016    MR PELVIS ANGIO W IV CONTRAST 2016 [facility]    OOPHORECTOMY Bilateral     OTHER SURGICAL HISTORY  2016    Breast Reconstruction With Tissue Expander Bilateral    OTHER SURGICAL HISTORY  2016    Breast Surgery Reconstruction With Free Flap       Social hx:   Social History     Socioeconomic History    Marital status: Single     Spouse name: Not on file    Number of children: Not on file    Years of education: Not on file    Highest education level: Not on file   Occupational History    Not on file   Tobacco Use    Smoking status: Former     Current packs/day: 0.00     Types: Cigarettes     Quit date:      Years since quitting: 15.6    Smokeless tobacco: Never   Vaping Use    Vaping status: Former    Quit date: 2009   Substance and Sexual Activity    Alcohol use: Yes     Alcohol/week: 1.0 standard drink of alcohol     Types: 1 Glasses of wine per week     Comment: social    Drug use: Never    Sexual activity: Not on file   Other Topics Concern    Not on file   Social History Narrative    Not on file     Social Determinants of Health     Financial Resource Strain: Not on file   Food Insecurity: Not on file   Transportation Needs: Not on file   Physical Activity: Not on file   Stress: Not on file   Social Connections: Not on file   Intimate Partner Violence: Not on file   Housing Stability: Not on file       Initial weight: 228  lbs  Current weight:   Vitals:    09/10/24 1045   Weight: 106 kg (234 lb 9.6 oz)       Preop Clearances:  Cardiac: Cleared Dr. Cespedes 1/17/24  Psych: Cleared Dr. Márquez 4/12/24    Sleep Study: Moderate apnea, Severe apnea, and Compliant with CPAP: >4 hours 73%    EGD: 5/14/24:  Impression  The esophagus appeared normal.  The body of the stomach appeared normal.  Abnormal mucosa in the antrum and prepyloric region; performed cold forceps biopsies  The duodenal bulb, 1st part of the duodenum and 2nd part of the duodenum appeared normal.  The cardia and fundus of the stomach appeared normal.    Findings  The esophagus appeared normal.  Regular Z-line 37 cm from the incisors  The body of the stomach appeared normal.  Abnormal mucosa in the antrum and prepyloric region; performed 4 cold forceps biopsies;  The duodenal bulb, 1st part of the duodenum and 2nd part of the duodenum appeared normal.  The cardia and fundus of the stomach appeared normal.    Preadmission testing date: 9/6/24    MEDICATIONS:  Prior to Admission Medications:    Current Outpatient Medications:     cyanocobalamin (Vitamin B-12) 1,000 mcg/mL injection, INJECT 1 ML (1,000 MCG) INTO THE MUSCLE EVERY 30 (THIRTY) DAYS., Disp: 9 mL, Rfl: 1    ergocalciferol (Vitamin D-2) 1.25 MG (94178 UT) capsule, Take 1 capsule (50,000 Units) by mouth 1 (one) time per week., Disp: 13 capsule, Rfl: 1    estradioL 10 mcg insert, dose pack, Insert vaginally daily for 14 days followed by 2 times/week, Disp: 18 each, Rfl: 0    metoprolol succinate XL (Toprol-XL) 25 mg 24 hr tablet, Take 1 tablet (25 mg) by mouth once daily., Disp: 90 tablet, Rfl: 3    oxybutynin XL (Ditropan-XL) 5 mg 24 hr tablet, Take 1 tablet (5 mg) by mouth once daily. Do not crush, chew, or split. (Patient not taking: Reported on 5/14/2024), Disp: 30 tablet, Rfl: 11    pantoprazole (ProtoNix) 40 mg EC tablet, TAKE 1 TABLET (40 MG) BY MOUTH DAILY DO NOT CRUSH, CHEW, ORSPLIT, Disp: 90 tablet, Rfl:  1    ALLERGIES:  No Known Allergies    REVIEW OF SYSTEMS:  GENERAL: Obese. Negative for malaise, significant weight loss and fever  NECK: Negative for lumps, goiter, pain and significant neck swelling  RESPIRATORY: Negative for cough, wheezing or shortness of breath.  CARDIOVASCULAR: Negative for chest pain, leg swelling or palpitations.  GI: Negative for abdominal discomfort, blood in stools or black stools or change in bowel habits  : No history of dysuria, frequency or incontinence  MUSCULOSKELETAL: Negative for joint pain or swelling, back pain or muscle pain.  SKIN: Negative for lesions, rash, and itching.  PSYCH: Negative for sleep disturbance, mood disorder and recent psychosocial stressors.  ENDOCRINE: Negative for cold or heat intolerance, polyuria, polydipsia and goiter.    PHYSICAL EXAM:  Visit Vitals  /90     General appearance: obese  Skin: warm, no erythema or rashes  Lungs: clear to percussion and auscultation  Heart: regular rhythm and S1, S2 normal  Abdomen: soft, non-tender, no masses, no organomegaly  Extremities: Normal exam of the extremities. No swelling or pain.        IMPRESSION:  Ayanna Willard is a 48 y.o. y.o. female with a BMI of Body mass index is 39.04 kg/m²..    They have been preoperatively evaluated and deemed to be an appropriate candidate for bariatric surgery.  Surgery Type: Laparoscopic sleeve gastrectomy  70846    All testing reviewed.  All clearances contained.    PLAN:    The risks of Laparoscopic sleeve gastrectomy  75325 surgery including bleeding, leak, wound infection, dehydration,  DVT/PE, prolonged nausea/vomiting, incomplete resolution of associated medical conditions, risk of persistent or worsening reflux, possible need for gastric bypass in future, weight regain, vitamin/mineral deficiencies, and death have been explained to the patient and Ayanna Willard has expressed understanding and acceptance of them.    The possible benefits of the above surgery  including weight loss, improvement/resolution of associated medical and mental health conditions, improved mobility, and decreased mortality have been explained the the patient and Ayanna Willard has expressed understanding and acceptance of them.    Operative and blood transfusion consent forms were signed by the patient and witnessed today.    Prescriptions for all required post-operative home medications were sent to the patient's pharmacy today and the patient will pick them up prior to surgery.    Further education was provided on day of surgery instructions and what to expect from the inpatient admission after surgery.    Crow Navas MD   Bariatric and Minimally Invasive General Surgery

## 2024-09-06 NOTE — H&P (VIEW-ONLY)
BARIATRIC SURGERY PREOPERATIVE VISIT    Date: 09/06/24  Time: 1055    Name: Ayanna Willard    MRN: [id number]    This is a 48 y.o. y.o. female with morbid obesity (Body mass index is 39.04 kg/m².) who plans to undergo Laparoscopic sleeve gastrectomy  99269 surgery. They have completed a rigorous preoperative medical work-up and bariatric surgery educational program.     PMH:   Patient Active Problem List   Diagnosis    Abdominal pain, LLQ (left lower quadrant)    Abnormal urine findings    Afib (Multi)    Atypical chest pain    Bacterial vaginosis    Closed nondisplaced fracture of fourth metatarsal bone of right foot    Closed nondisplaced fracture of second metatarsal bone of right foot    Closed nondisplaced fracture of third metatarsal bone of right foot    Dense breast    Decreased range of motion of right ankle    Eczema    Fatigue    Fatty liver    Fibrocystic breast    Fracture of foot    Gastroesophageal reflux disease    Gastroesophageal reflux disease with esophagitis    Genetic susceptibility to malignant neoplasm of breast    Herpes genitalia    Impaired fasting glucose    Impaired strength of lower extremity    Increased frequency of urination    Left wrist pain    Right wrist pain    Lower urinary tract infectious disease    Lumbar sprain    Nasal congestion    Neck pain    Obesity, unspecified    Obstructive sleep apnea syndrome    Other disorders of intestinal carbohydrate absorption    Polyphagia    Prehypertension    Right foot pain    Joint pain, foot    Right shoulder pain    S/P breast reconstruction    Sinusitis    Sore throat    Sprain of ankle    Sprain of ligament of tarsometatarsal joint, right, initial encounter    Tachycardia    Tenosynovitis of left wrist    Vaginitis    Vitamin D deficiency    Anemia    Familial cancer of breast (Multi)    Hypothyroidism    Vitamin B12 deficiency    LLQ pain    Lower abdominal pain    Problems related to inappropriate diet and eating habits     Pre-operative clearance    History of bariatric surgery    History of colonic polyps    Routine general medical examination at a health care facility    Class 2 obesity with alveolar hypoventilation, serious comorbidity, and body mass index (BMI) of 38.0 to 38.9 in adult (Multi)    Morbid obesity (Multi)       PSH:   Past Surgical History:   Procedure Laterality Date     SECTION, CLASSIC       Section     COLONOSCOPY      ESOPHAGOGASTRODUODENOSCOPY      MASTECTOMY Bilateral 2016    MR PELVIS ANGIO W IV CONTRAST  2016    MR PELVIS ANGIO W IV CONTRAST 2016 [facility]    OOPHORECTOMY Bilateral     OTHER SURGICAL HISTORY  2016    Breast Reconstruction With Tissue Expander Bilateral    OTHER SURGICAL HISTORY  2016    Breast Surgery Reconstruction With Free Flap       Social hx:   Social History     Socioeconomic History    Marital status: Single     Spouse name: Not on file    Number of children: Not on file    Years of education: Not on file    Highest education level: Not on file   Occupational History    Not on file   Tobacco Use    Smoking status: Former     Current packs/day: 0.00     Types: Cigarettes     Quit date:      Years since quitting: 15.6    Smokeless tobacco: Never   Vaping Use    Vaping status: Former    Quit date: 2009   Substance and Sexual Activity    Alcohol use: Yes     Alcohol/week: 1.0 standard drink of alcohol     Types: 1 Glasses of wine per week     Comment: social    Drug use: Never    Sexual activity: Not on file   Other Topics Concern    Not on file   Social History Narrative    Not on file     Social Determinants of Health     Financial Resource Strain: Not on file   Food Insecurity: Not on file   Transportation Needs: Not on file   Physical Activity: Not on file   Stress: Not on file   Social Connections: Not on file   Intimate Partner Violence: Not on file   Housing Stability: Not on file       Initial weight: 228  lbs  Current weight:   Vitals:    09/10/24 1045   Weight: 106 kg (234 lb 9.6 oz)       Preop Clearances:  Cardiac: Cleared Dr. Cespedes 1/17/24  Psych: Cleared Dr. Márquez 4/12/24    Sleep Study: Moderate apnea, Severe apnea, and Compliant with CPAP: >4 hours 73%    EGD: 5/14/24:  Impression  The esophagus appeared normal.  The body of the stomach appeared normal.  Abnormal mucosa in the antrum and prepyloric region; performed cold forceps biopsies  The duodenal bulb, 1st part of the duodenum and 2nd part of the duodenum appeared normal.  The cardia and fundus of the stomach appeared normal.    Findings  The esophagus appeared normal.  Regular Z-line 37 cm from the incisors  The body of the stomach appeared normal.  Abnormal mucosa in the antrum and prepyloric region; performed 4 cold forceps biopsies;  The duodenal bulb, 1st part of the duodenum and 2nd part of the duodenum appeared normal.  The cardia and fundus of the stomach appeared normal.    Preadmission testing date: 9/6/24    MEDICATIONS:  Prior to Admission Medications:    Current Outpatient Medications:     cyanocobalamin (Vitamin B-12) 1,000 mcg/mL injection, INJECT 1 ML (1,000 MCG) INTO THE MUSCLE EVERY 30 (THIRTY) DAYS., Disp: 9 mL, Rfl: 1    ergocalciferol (Vitamin D-2) 1.25 MG (51279 UT) capsule, Take 1 capsule (50,000 Units) by mouth 1 (one) time per week., Disp: 13 capsule, Rfl: 1    estradioL 10 mcg insert, dose pack, Insert vaginally daily for 14 days followed by 2 times/week, Disp: 18 each, Rfl: 0    metoprolol succinate XL (Toprol-XL) 25 mg 24 hr tablet, Take 1 tablet (25 mg) by mouth once daily., Disp: 90 tablet, Rfl: 3    oxybutynin XL (Ditropan-XL) 5 mg 24 hr tablet, Take 1 tablet (5 mg) by mouth once daily. Do not crush, chew, or split. (Patient not taking: Reported on 5/14/2024), Disp: 30 tablet, Rfl: 11    pantoprazole (ProtoNix) 40 mg EC tablet, TAKE 1 TABLET (40 MG) BY MOUTH DAILY DO NOT CRUSH, CHEW, ORSPLIT, Disp: 90 tablet, Rfl:  1    ALLERGIES:  No Known Allergies    REVIEW OF SYSTEMS:  GENERAL: Obese. Negative for malaise, significant weight loss and fever  NECK: Negative for lumps, goiter, pain and significant neck swelling  RESPIRATORY: Negative for cough, wheezing or shortness of breath.  CARDIOVASCULAR: Negative for chest pain, leg swelling or palpitations.  GI: Negative for abdominal discomfort, blood in stools or black stools or change in bowel habits  : No history of dysuria, frequency or incontinence  MUSCULOSKELETAL: Negative for joint pain or swelling, back pain or muscle pain.  SKIN: Negative for lesions, rash, and itching.  PSYCH: Negative for sleep disturbance, mood disorder and recent psychosocial stressors.  ENDOCRINE: Negative for cold or heat intolerance, polyuria, polydipsia and goiter.    PHYSICAL EXAM:  Visit Vitals  /90     General appearance: obese  Skin: warm, no erythema or rashes  Lungs: clear to percussion and auscultation  Heart: regular rhythm and S1, S2 normal  Abdomen: soft, non-tender, no masses, no organomegaly  Extremities: Normal exam of the extremities. No swelling or pain.        IMPRESSION:  Ayanna Willard is a 48 y.o. y.o. female with a BMI of Body mass index is 39.04 kg/m²..    They have been preoperatively evaluated and deemed to be an appropriate candidate for bariatric surgery.  Surgery Type: Laparoscopic sleeve gastrectomy  97644    All testing reviewed.  All clearances contained.    PLAN:    The risks of Laparoscopic sleeve gastrectomy  08733 surgery including bleeding, leak, wound infection, dehydration,  DVT/PE, prolonged nausea/vomiting, incomplete resolution of associated medical conditions, risk of persistent or worsening reflux, possible need for gastric bypass in future, weight regain, vitamin/mineral deficiencies, and death have been explained to the patient and Ayanna Willard has expressed understanding and acceptance of them.    The possible benefits of the above surgery  including weight loss, improvement/resolution of associated medical and mental health conditions, improved mobility, and decreased mortality have been explained the the patient and Ayanna Willard has expressed understanding and acceptance of them.    Operative and blood transfusion consent forms were signed by the patient and witnessed today.    Prescriptions for all required post-operative home medications were sent to the patient's pharmacy today and the patient will pick them up prior to surgery.    Further education was provided on day of surgery instructions and what to expect from the inpatient admission after surgery.    Crow Navas MD   Bariatric and Minimally Invasive General Surgery

## 2024-09-07 LAB — HOLD SPECIMEN: NORMAL

## 2024-09-10 ENCOUNTER — CLINICAL SUPPORT (OUTPATIENT)
Dept: SURGERY | Facility: HOSPITAL | Age: 49
End: 2024-09-10
Payer: COMMERCIAL

## 2024-09-10 ENCOUNTER — OFFICE VISIT (OUTPATIENT)
Dept: SURGERY | Facility: HOSPITAL | Age: 49
End: 2024-09-10
Payer: COMMERCIAL

## 2024-09-10 VITALS
BODY MASS INDEX: 39.09 KG/M2 | HEIGHT: 65 IN | WEIGHT: 234.6 LBS | SYSTOLIC BLOOD PRESSURE: 130 MMHG | DIASTOLIC BLOOD PRESSURE: 90 MMHG

## 2024-09-10 DIAGNOSIS — E66.9 CLASS 2 OBESITY WITH BODY MASS INDEX (BMI) OF 38.0 TO 38.9 IN ADULT, UNSPECIFIED OBESITY TYPE, UNSPECIFIED WHETHER SERIOUS COMORBIDITY PRESENT: Primary | ICD-10-CM

## 2024-09-10 DIAGNOSIS — E53.8 VITAMIN B12 DEFICIENCY: ICD-10-CM

## 2024-09-10 DIAGNOSIS — K76.0 FATTY LIVER: ICD-10-CM

## 2024-09-10 DIAGNOSIS — G89.18 POST-OP PAIN: ICD-10-CM

## 2024-09-10 DIAGNOSIS — Z13.21 ENCOUNTER FOR VITAMIN DEFICIENCY SCREENING: ICD-10-CM

## 2024-09-10 DIAGNOSIS — R11.0 NAUSEA: ICD-10-CM

## 2024-09-10 DIAGNOSIS — Z98.84 BARIATRIC SURGERY STATUS: ICD-10-CM

## 2024-09-10 DIAGNOSIS — Z71.3 PRE-BARIATRIC SURGERY NUTRITION EVALUATION: ICD-10-CM

## 2024-09-10 DIAGNOSIS — K21.00 GASTROESOPHAGEAL REFLUX DISEASE WITH ESOPHAGITIS WITHOUT HEMORRHAGE: ICD-10-CM

## 2024-09-10 PROCEDURE — 99215 OFFICE O/P EST HI 40 MIN: CPT | Performed by: SURGERY

## 2024-09-10 PROCEDURE — 3008F BODY MASS INDEX DOCD: CPT | Performed by: SURGERY

## 2024-09-10 RX ORDER — ONDANSETRON 4 MG/1
4 TABLET, ORALLY DISINTEGRATING ORAL EVERY 6 HOURS PRN
Qty: 60 TABLET | Refills: 1 | Status: SHIPPED | OUTPATIENT
Start: 2024-09-10

## 2024-09-10 RX ORDER — OMEPRAZOLE 40 MG/1
40 CAPSULE, DELAYED RELEASE ORAL
Qty: 30 CAPSULE | Refills: 5 | Status: SHIPPED | OUTPATIENT
Start: 2024-09-10

## 2024-09-10 RX ORDER — OXYCODONE HCL 5 MG/5 ML
5 SOLUTION, ORAL ORAL EVERY 6 HOURS PRN
Qty: 140 ML | Refills: 0 | Status: SHIPPED | OUTPATIENT
Start: 2024-09-10 | End: 2024-09-17

## 2024-09-10 NOTE — PROGRESS NOTES
Ayanna Willard attended final pre op class. Patient participated in quiz and asked appropriate questions.    Index Surgery  Date of Surgery: 9/23/24    Surgeon: Dr. Navas    Surgical Procedure: Laparoscopic sleeve gastrectomy  17421    EDMUND BERNARDO

## 2024-09-11 LAB
ANABASINE UR-MCNC: <5 NG/ML
COTININE UR-MCNC: <15 NG/ML
NICOTINE UR-MCNC: <15 NG/ML
TRANS-3-OH-COTININE UR-MCNC: <50 NG/ML

## 2024-09-17 DIAGNOSIS — E55.9 VITAMIN D DEFICIENCY: ICD-10-CM

## 2024-09-17 RX ORDER — ERGOCALCIFEROL 1.25 MG/1
50000 CAPSULE ORAL
Qty: 13 CAPSULE | Refills: 0 | Status: SHIPPED | OUTPATIENT
Start: 2024-09-17

## 2024-09-20 ENCOUNTER — TELEPHONE (OUTPATIENT)
Dept: SURGERY | Facility: HOSPITAL | Age: 49
End: 2024-09-20
Payer: COMMERCIAL

## 2024-09-20 NOTE — PROGRESS NOTES
Pharmacy Medication History Review    Ayanna Willard is a 48 y.o. female who is planned to be admitted for Morbid obesity (Multi). Pharmacy called the patient prior to their scheduled procedure and reviewed the patient's [id number] medications for accuracy.    Medications ADDED:  none  Medications CHANGED:  none  Medications REMOVED:   Estradiol cream  Oxybutynin ER 5mg    Please review updated prior to admission medication list and comments regarding how patient may be taking medications differently by going to Admission tab --> Admission Orders --> Admit Orders / Review prior to admission medications.     Preferred pharmacy and allergies to be confirmed with patient by nursing the day of procedure.     Sources used to complete the med history include spoke with patient, surescripts, oarrs, care everywhere    Below are additional concerns with the patient's PTA list.  Patient confirmed they are taking #1 tablet daily of the metoprolol succinate 25mg    Elo Odell    [facility]  Please reach out via Secure Chat for questions

## 2024-09-23 ENCOUNTER — HOSPITAL ENCOUNTER (INPATIENT)
Facility: HOSPITAL | Age: 49
LOS: 1 days | Discharge: HOME | DRG: 621 | End: 2024-09-24
Attending: SURGERY | Admitting: SURGERY
Payer: COMMERCIAL

## 2024-09-23 ENCOUNTER — ANESTHESIA EVENT (OUTPATIENT)
Dept: OPERATING ROOM | Facility: HOSPITAL | Age: 49
End: 2024-09-23
Payer: COMMERCIAL

## 2024-09-23 ENCOUNTER — APPOINTMENT (OUTPATIENT)
Dept: PRIMARY CARE | Facility: CLINIC | Age: 49
End: 2024-09-23
Payer: COMMERCIAL

## 2024-09-23 ENCOUNTER — ANESTHESIA (OUTPATIENT)
Dept: OPERATING ROOM | Facility: HOSPITAL | Age: 49
End: 2024-09-23
Payer: COMMERCIAL

## 2024-09-23 DIAGNOSIS — E66.812 CLASS 2 OBESITY WITH BODY MASS INDEX (BMI) OF 38.0 TO 38.9 IN ADULT, UNSPECIFIED OBESITY TYPE, UNSPECIFIED WHETHER SERIOUS COMORBIDITY PRESENT: ICD-10-CM

## 2024-09-23 DIAGNOSIS — E66.01 MORBID OBESITY DUE TO EXCESS CALORIES (MULTI): Primary | ICD-10-CM

## 2024-09-23 DIAGNOSIS — E66.9 CLASS 2 OBESITY WITH BODY MASS INDEX (BMI) OF 38.0 TO 38.9 IN ADULT, UNSPECIFIED OBESITY TYPE, UNSPECIFIED WHETHER SERIOUS COMORBIDITY PRESENT: ICD-10-CM

## 2024-09-23 DIAGNOSIS — E66.01 MORBID OBESITY (MULTI): ICD-10-CM

## 2024-09-23 DIAGNOSIS — G89.18 POST-OP PAIN: ICD-10-CM

## 2024-09-23 DIAGNOSIS — Z98.84 BARIATRIC SURGERY STATUS: ICD-10-CM

## 2024-09-23 LAB
ABO GROUP (TYPE) IN BLOOD: NORMAL
ANTIBODY SCREEN: NORMAL
RH FACTOR (ANTIGEN D): NORMAL

## 2024-09-23 PROCEDURE — 2720000007 HC OR 272 NO HCPCS: Performed by: SURGERY

## 2024-09-23 PROCEDURE — 43775 LAP SLEEVE GASTRECTOMY: CPT | Performed by: SURGERY

## 2024-09-23 PROCEDURE — 2500000001 HC RX 250 WO HCPCS SELF ADMINISTERED DRUGS (ALT 637 FOR MEDICARE OP)

## 2024-09-23 PROCEDURE — 2500000005 HC RX 250 GENERAL PHARMACY W/O HCPCS

## 2024-09-23 PROCEDURE — 1200000002 HC GENERAL ROOM WITH TELEMETRY DAILY

## 2024-09-23 PROCEDURE — 2500000004 HC RX 250 GENERAL PHARMACY W/ HCPCS (ALT 636 FOR OP/ED): Performed by: ANESTHESIOLOGY

## 2024-09-23 PROCEDURE — A43775 PR LAP, GAST RESTRICT PROC, LONGITUDINAL GASTRECTOMY: Performed by: ANESTHESIOLOGY

## 2024-09-23 PROCEDURE — 7100000002 HC RECOVERY ROOM TIME - EACH INCREMENTAL 1 MINUTE: Performed by: SURGERY

## 2024-09-23 PROCEDURE — 2500000005 HC RX 250 GENERAL PHARMACY W/O HCPCS: Performed by: SURGERY

## 2024-09-23 PROCEDURE — 3600000009 HC OR TIME - EACH INCREMENTAL 1 MINUTE - PROCEDURE LEVEL FOUR: Performed by: SURGERY

## 2024-09-23 PROCEDURE — 3700000002 HC GENERAL ANESTHESIA TIME - EACH INCREMENTAL 1 MINUTE: Performed by: SURGERY

## 2024-09-23 PROCEDURE — A43775 PR LAP, GAST RESTRICT PROC, LONGITUDINAL GASTRECTOMY

## 2024-09-23 PROCEDURE — 0DJ08ZZ INSPECTION OF UPPER INTESTINAL TRACT, VIA NATURAL OR ARTIFICIAL OPENING ENDOSCOPIC: ICD-10-PCS | Performed by: SURGERY

## 2024-09-23 PROCEDURE — 2500000004 HC RX 250 GENERAL PHARMACY W/ HCPCS (ALT 636 FOR OP/ED)

## 2024-09-23 PROCEDURE — 86901 BLOOD TYPING SEROLOGIC RH(D): CPT

## 2024-09-23 PROCEDURE — 7100000001 HC RECOVERY ROOM TIME - INITIAL BASE CHARGE: Performed by: SURGERY

## 2024-09-23 PROCEDURE — 3600000004 HC OR TIME - INITIAL BASE CHARGE - PROCEDURE LEVEL FOUR: Performed by: SURGERY

## 2024-09-23 PROCEDURE — 0DB64Z3 EXCISION OF STOMACH, PERCUTANEOUS ENDOSCOPIC APPROACH, VERTICAL: ICD-10-PCS | Performed by: SURGERY

## 2024-09-23 PROCEDURE — 3700000001 HC GENERAL ANESTHESIA TIME - INITIAL BASE CHARGE: Performed by: SURGERY

## 2024-09-23 PROCEDURE — 36415 COLL VENOUS BLD VENIPUNCTURE: CPT

## 2024-09-23 PROCEDURE — 2500000004 HC RX 250 GENERAL PHARMACY W/ HCPCS (ALT 636 FOR OP/ED): Performed by: SURGERY

## 2024-09-23 PROCEDURE — 0754T DGTZ GLS MCRSCP SLD LEVEL V: CPT | Mod: TC,SUR | Performed by: STUDENT IN AN ORGANIZED HEALTH CARE EDUCATION/TRAINING PROGRAM

## 2024-09-23 PROCEDURE — 2500000001 HC RX 250 WO HCPCS SELF ADMINISTERED DRUGS (ALT 637 FOR MEDICARE OP): Performed by: SURGERY

## 2024-09-23 RX ORDER — HYDROMORPHONE HYDROCHLORIDE 1 MG/ML
0.5 INJECTION, SOLUTION INTRAMUSCULAR; INTRAVENOUS; SUBCUTANEOUS EVERY 5 MIN PRN
Status: DISCONTINUED | OUTPATIENT
Start: 2024-09-23 | End: 2024-09-23 | Stop reason: HOSPADM

## 2024-09-23 RX ORDER — METHOCARBAMOL 100 MG/ML
1000 INJECTION, SOLUTION INTRAMUSCULAR; INTRAVENOUS ONCE
Status: COMPLETED | OUTPATIENT
Start: 2024-09-23 | End: 2024-09-23

## 2024-09-23 RX ORDER — SODIUM CHLORIDE, SODIUM LACTATE, POTASSIUM CHLORIDE, CALCIUM CHLORIDE 600; 310; 30; 20 MG/100ML; MG/100ML; MG/100ML; MG/100ML
100 INJECTION, SOLUTION INTRAVENOUS CONTINUOUS
Status: DISCONTINUED | OUTPATIENT
Start: 2024-09-23 | End: 2024-09-23 | Stop reason: HOSPADM

## 2024-09-23 RX ORDER — ONDANSETRON 4 MG/1
4 TABLET, FILM COATED ORAL EVERY 8 HOURS PRN
Status: DISCONTINUED | OUTPATIENT
Start: 2024-09-23 | End: 2024-09-24 | Stop reason: HOSPADM

## 2024-09-23 RX ORDER — MIDAZOLAM HYDROCHLORIDE 1 MG/ML
INJECTION INTRAMUSCULAR; INTRAVENOUS AS NEEDED
Status: DISCONTINUED | OUTPATIENT
Start: 2024-09-23 | End: 2024-09-23

## 2024-09-23 RX ORDER — ONDANSETRON HYDROCHLORIDE 2 MG/ML
4 INJECTION, SOLUTION INTRAVENOUS EVERY 8 HOURS PRN
Status: DISCONTINUED | OUTPATIENT
Start: 2024-09-23 | End: 2024-09-24 | Stop reason: HOSPADM

## 2024-09-23 RX ORDER — LIDOCAINE HCL/PF 100 MG/5ML
SYRINGE (ML) INTRAVENOUS
Status: DISCONTINUED
Start: 2024-09-23 | End: 2024-09-24 | Stop reason: HOSPADM

## 2024-09-23 RX ORDER — SODIUM CHLORIDE, SODIUM LACTATE, POTASSIUM CHLORIDE, CALCIUM CHLORIDE 600; 310; 30; 20 MG/100ML; MG/100ML; MG/100ML; MG/100ML
20 INJECTION, SOLUTION INTRAVENOUS CONTINUOUS
Status: DISCONTINUED | OUTPATIENT
Start: 2024-09-23 | End: 2024-09-24 | Stop reason: HOSPADM

## 2024-09-23 RX ORDER — ESMOLOL HYDROCHLORIDE 10 MG/ML
INJECTION INTRAVENOUS
Status: COMPLETED
Start: 2024-09-23 | End: 2024-09-23

## 2024-09-23 RX ORDER — HYDROMORPHONE HYDROCHLORIDE 1 MG/ML
0.2 INJECTION, SOLUTION INTRAMUSCULAR; INTRAVENOUS; SUBCUTANEOUS EVERY 5 MIN PRN
Status: DISCONTINUED | OUTPATIENT
Start: 2024-09-23 | End: 2024-09-23 | Stop reason: HOSPADM

## 2024-09-23 RX ORDER — PROPOFOL 10 MG/ML
INJECTION, EMULSION INTRAVENOUS
Status: COMPLETED
Start: 2024-09-23 | End: 2024-09-23

## 2024-09-23 RX ORDER — LIDOCAINE HYDROCHLORIDE 10 MG/ML
0.1 INJECTION, SOLUTION INFILTRATION; PERINEURAL ONCE
Status: DISCONTINUED | OUTPATIENT
Start: 2024-09-23 | End: 2024-09-23 | Stop reason: HOSPADM

## 2024-09-23 RX ORDER — OXYCODONE HYDROCHLORIDE 5 MG/1
10 TABLET ORAL EVERY 4 HOURS PRN
Status: DISCONTINUED | OUTPATIENT
Start: 2024-09-23 | End: 2024-09-24

## 2024-09-23 RX ORDER — KETOROLAC TROMETHAMINE 30 MG/ML
15 INJECTION, SOLUTION INTRAMUSCULAR; INTRAVENOUS EVERY 6 HOURS SCHEDULED
Status: DISCONTINUED | OUTPATIENT
Start: 2024-09-24 | End: 2024-09-24 | Stop reason: HOSPADM

## 2024-09-23 RX ORDER — OXYCODONE HCL 5 MG/5 ML
5 SOLUTION, ORAL ORAL EVERY 6 HOURS PRN
Status: DISCONTINUED | OUTPATIENT
Start: 2024-09-23 | End: 2024-09-24 | Stop reason: HOSPADM

## 2024-09-23 RX ORDER — PANTOPRAZOLE SODIUM 40 MG/10ML
40 INJECTION, POWDER, LYOPHILIZED, FOR SOLUTION INTRAVENOUS
Status: DISCONTINUED | OUTPATIENT
Start: 2024-09-24 | End: 2024-09-24 | Stop reason: HOSPADM

## 2024-09-23 RX ORDER — FENTANYL CITRATE 50 UG/ML
INJECTION, SOLUTION INTRAMUSCULAR; INTRAVENOUS
Status: COMPLETED
Start: 2024-09-23 | End: 2024-09-23

## 2024-09-23 RX ORDER — CEFAZOLIN 1 G/1
INJECTION, POWDER, FOR SOLUTION INTRAVENOUS
Status: DISCONTINUED
Start: 2024-09-23 | End: 2024-09-24 | Stop reason: HOSPADM

## 2024-09-23 RX ORDER — NALOXONE HYDROCHLORIDE 0.4 MG/ML
0.2 INJECTION, SOLUTION INTRAMUSCULAR; INTRAVENOUS; SUBCUTANEOUS EVERY 5 MIN PRN
Status: DISCONTINUED | OUTPATIENT
Start: 2024-09-23 | End: 2024-09-24 | Stop reason: HOSPADM

## 2024-09-23 RX ORDER — LABETALOL HYDROCHLORIDE 5 MG/ML
5 INJECTION, SOLUTION INTRAVENOUS ONCE AS NEEDED
Status: COMPLETED | OUTPATIENT
Start: 2024-09-23 | End: 2024-09-23

## 2024-09-23 RX ORDER — CEFAZOLIN 1 G/1
INJECTION, POWDER, FOR SOLUTION INTRAVENOUS AS NEEDED
Status: DISCONTINUED | OUTPATIENT
Start: 2024-09-23 | End: 2024-09-23

## 2024-09-23 RX ORDER — OXYCODONE HYDROCHLORIDE 5 MG/1
5 TABLET ORAL EVERY 4 HOURS PRN
Status: DISCONTINUED | OUTPATIENT
Start: 2024-09-23 | End: 2024-09-23 | Stop reason: HOSPADM

## 2024-09-23 RX ORDER — ROCURONIUM BROMIDE 10 MG/ML
INJECTION, SOLUTION INTRAVENOUS
Status: COMPLETED
Start: 2024-09-23 | End: 2024-09-23

## 2024-09-23 RX ORDER — CEFAZOLIN SODIUM 2 G/100ML
2 INJECTION, SOLUTION INTRAVENOUS EVERY 8 HOURS
Status: DISCONTINUED | OUTPATIENT
Start: 2024-09-23 | End: 2024-09-24

## 2024-09-23 RX ORDER — SODIUM CHLORIDE, SODIUM LACTATE, POTASSIUM CHLORIDE, CALCIUM CHLORIDE 600; 310; 30; 20 MG/100ML; MG/100ML; MG/100ML; MG/100ML
150 INJECTION, SOLUTION INTRAVENOUS CONTINUOUS
Status: DISCONTINUED | OUTPATIENT
Start: 2024-09-23 | End: 2024-09-24 | Stop reason: HOSPADM

## 2024-09-23 RX ORDER — ONDANSETRON HYDROCHLORIDE 2 MG/ML
INJECTION, SOLUTION INTRAVENOUS
Status: COMPLETED
Start: 2024-09-23 | End: 2024-09-23

## 2024-09-23 RX ORDER — SODIUM CHLORIDE 0.9 G/100ML
IRRIGANT IRRIGATION AS NEEDED
Status: DISCONTINUED | OUTPATIENT
Start: 2024-09-23 | End: 2024-09-23 | Stop reason: HOSPADM

## 2024-09-23 RX ORDER — ONDANSETRON HYDROCHLORIDE 2 MG/ML
INJECTION, SOLUTION INTRAVENOUS AS NEEDED
Status: DISCONTINUED | OUTPATIENT
Start: 2024-09-23 | End: 2024-09-23

## 2024-09-23 RX ORDER — ROCURONIUM BROMIDE 10 MG/ML
INJECTION, SOLUTION INTRAVENOUS
Status: DISCONTINUED
Start: 2024-09-23 | End: 2024-09-24 | Stop reason: HOSPADM

## 2024-09-23 RX ORDER — FENTANYL CITRATE 50 UG/ML
INJECTION, SOLUTION INTRAMUSCULAR; INTRAVENOUS AS NEEDED
Status: DISCONTINUED | OUTPATIENT
Start: 2024-09-23 | End: 2024-09-23

## 2024-09-23 RX ORDER — CEFAZOLIN 1 G/1
INJECTION, POWDER, FOR SOLUTION INTRAVENOUS
Status: COMPLETED
Start: 2024-09-23 | End: 2024-09-23

## 2024-09-23 RX ORDER — ONDANSETRON HYDROCHLORIDE 2 MG/ML
INJECTION, SOLUTION INTRAVENOUS
Status: DISCONTINUED
Start: 2024-09-23 | End: 2024-09-24 | Stop reason: HOSPADM

## 2024-09-23 RX ORDER — PANTOPRAZOLE SODIUM 40 MG/1
40 TABLET, DELAYED RELEASE ORAL
Status: DISCONTINUED | OUTPATIENT
Start: 2024-09-24 | End: 2024-09-24 | Stop reason: HOSPADM

## 2024-09-23 RX ORDER — WATER 1 ML/ML
IRRIGANT IRRIGATION AS NEEDED
Status: DISCONTINUED | OUTPATIENT
Start: 2024-09-23 | End: 2024-09-23 | Stop reason: HOSPADM

## 2024-09-23 RX ORDER — GABAPENTIN 300 MG/1
600 CAPSULE ORAL ONCE
Status: COMPLETED | OUTPATIENT
Start: 2024-09-23 | End: 2024-09-23

## 2024-09-23 RX ORDER — CEFAZOLIN SODIUM 2 G/100ML
2 INJECTION, SOLUTION INTRAVENOUS ONCE
Status: DISCONTINUED | OUTPATIENT
Start: 2024-09-23 | End: 2024-09-23 | Stop reason: HOSPADM

## 2024-09-23 RX ORDER — HEPARIN SODIUM 5000 [USP'U]/ML
5000 INJECTION, SOLUTION INTRAVENOUS; SUBCUTANEOUS EVERY 8 HOURS
Status: DISCONTINUED | OUTPATIENT
Start: 2024-09-24 | End: 2024-09-24 | Stop reason: HOSPADM

## 2024-09-23 RX ORDER — ALBUTEROL SULFATE 0.83 MG/ML
2.5 SOLUTION RESPIRATORY (INHALATION) ONCE AS NEEDED
Status: DISCONTINUED | OUTPATIENT
Start: 2024-09-23 | End: 2024-09-23 | Stop reason: HOSPADM

## 2024-09-23 RX ORDER — DIPHENHYDRAMINE HYDROCHLORIDE 50 MG/ML
25 INJECTION INTRAMUSCULAR; INTRAVENOUS ONCE AS NEEDED
Status: DISCONTINUED | OUTPATIENT
Start: 2024-09-23 | End: 2024-09-23 | Stop reason: HOSPADM

## 2024-09-23 RX ORDER — ONDANSETRON HYDROCHLORIDE 2 MG/ML
4 INJECTION, SOLUTION INTRAVENOUS ONCE AS NEEDED
Status: COMPLETED | OUTPATIENT
Start: 2024-09-23 | End: 2024-09-23

## 2024-09-23 RX ORDER — SIMETHICONE 80 MG
80 TABLET,CHEWABLE ORAL EVERY 4 HOURS PRN
Status: DISCONTINUED | OUTPATIENT
Start: 2024-09-23 | End: 2024-09-24 | Stop reason: HOSPADM

## 2024-09-23 RX ORDER — ROCURONIUM BROMIDE 10 MG/ML
INJECTION, SOLUTION INTRAVENOUS AS NEEDED
Status: DISCONTINUED | OUTPATIENT
Start: 2024-09-23 | End: 2024-09-23

## 2024-09-23 RX ORDER — ACETAMINOPHEN 10 MG/ML
INJECTION, SOLUTION INTRAVENOUS AS NEEDED
Status: DISCONTINUED | OUTPATIENT
Start: 2024-09-23 | End: 2024-09-23

## 2024-09-23 RX ORDER — ESMOLOL HYDROCHLORIDE 10 MG/ML
INJECTION INTRAVENOUS AS NEEDED
Status: DISCONTINUED | OUTPATIENT
Start: 2024-09-23 | End: 2024-09-23

## 2024-09-23 RX ORDER — METOPROLOL TARTRATE 1 MG/ML
5 INJECTION, SOLUTION INTRAVENOUS EVERY 6 HOURS
Status: DISCONTINUED | OUTPATIENT
Start: 2024-09-23 | End: 2024-09-24 | Stop reason: HOSPADM

## 2024-09-23 RX ORDER — PROPOFOL 10 MG/ML
INJECTION, EMULSION INTRAVENOUS AS NEEDED
Status: DISCONTINUED | OUTPATIENT
Start: 2024-09-23 | End: 2024-09-23

## 2024-09-23 RX ORDER — GABAPENTIN 300 MG/1
CAPSULE ORAL AS NEEDED
Status: DISCONTINUED | OUTPATIENT
Start: 2024-09-23 | End: 2024-09-23

## 2024-09-23 RX ORDER — BUPIVACAINE HYDROCHLORIDE 5 MG/ML
INJECTION, SOLUTION PERINEURAL AS NEEDED
Status: DISCONTINUED | OUTPATIENT
Start: 2024-09-23 | End: 2024-09-23 | Stop reason: HOSPADM

## 2024-09-23 RX ORDER — LIDOCAINE HYDROCHLORIDE 20 MG/ML
INJECTION, SOLUTION INFILTRATION; PERINEURAL AS NEEDED
Status: DISCONTINUED | OUTPATIENT
Start: 2024-09-23 | End: 2024-09-23

## 2024-09-23 RX ORDER — SCOLOPAMINE TRANSDERMAL SYSTEM 1 MG/1
1 PATCH, EXTENDED RELEASE TRANSDERMAL ONCE
Status: DISCONTINUED | OUTPATIENT
Start: 2024-09-23 | End: 2024-09-24 | Stop reason: HOSPADM

## 2024-09-23 RX ORDER — ESOMEPRAZOLE MAGNESIUM 40 MG/1
40 GRANULE, DELAYED RELEASE ORAL
Status: DISCONTINUED | OUTPATIENT
Start: 2024-09-24 | End: 2024-09-24 | Stop reason: HOSPADM

## 2024-09-23 RX ORDER — HYDROMORPHONE HYDROCHLORIDE 1 MG/ML
0.5 INJECTION, SOLUTION INTRAMUSCULAR; INTRAVENOUS; SUBCUTANEOUS EVERY 4 HOURS PRN
Status: DISCONTINUED | OUTPATIENT
Start: 2024-09-23 | End: 2024-09-24 | Stop reason: HOSPADM

## 2024-09-23 RX ORDER — MIDAZOLAM HYDROCHLORIDE 1 MG/ML
INJECTION INTRAMUSCULAR; INTRAVENOUS
Status: COMPLETED
Start: 2024-09-23 | End: 2024-09-23

## 2024-09-23 RX ORDER — ACETAMINOPHEN 10 MG/ML
1000 INJECTION, SOLUTION INTRAVENOUS EVERY 6 HOURS SCHEDULED
Status: DISCONTINUED | OUTPATIENT
Start: 2024-09-24 | End: 2024-09-24 | Stop reason: HOSPADM

## 2024-09-23 RX ORDER — METOPROLOL SUCCINATE 25 MG/1
25 TABLET, EXTENDED RELEASE ORAL DAILY
Status: DISCONTINUED | OUTPATIENT
Start: 2024-09-25 | End: 2024-09-24 | Stop reason: HOSPADM

## 2024-09-23 RX ORDER — HEPARIN SODIUM 5000 [USP'U]/ML
5000 INJECTION, SOLUTION INTRAVENOUS; SUBCUTANEOUS ONCE
Status: COMPLETED | OUTPATIENT
Start: 2024-09-23 | End: 2024-09-23

## 2024-09-23 SDOH — SOCIAL STABILITY: SOCIAL INSECURITY: DO YOU FEEL ANYONE HAS EXPLOITED OR TAKEN ADVANTAGE OF YOU FINANCIALLY OR OF YOUR PERSONAL PROPERTY?: NO

## 2024-09-23 SDOH — HEALTH STABILITY: MENTAL HEALTH
STRESS IS WHEN SOMEONE FEELS TENSE, NERVOUS, ANXIOUS, OR CAN'T SLEEP AT NIGHT BECAUSE THEIR MIND IS TROUBLED. HOW STRESSED ARE YOU?: NOT AT ALL

## 2024-09-23 SDOH — ECONOMIC STABILITY: FOOD INSECURITY: WITHIN THE PAST 12 MONTHS, THE FOOD YOU BOUGHT JUST DIDN'T LAST AND YOU DIDN'T HAVE MONEY TO GET MORE.: PATIENT DECLINED

## 2024-09-23 SDOH — ECONOMIC STABILITY: HOUSING INSECURITY: AT ANY TIME IN THE PAST 12 MONTHS, WERE YOU HOMELESS OR LIVING IN A SHELTER (INCLUDING NOW)?: PATIENT DECLINED

## 2024-09-23 SDOH — ECONOMIC STABILITY: INCOME INSECURITY: IN THE LAST 12 MONTHS, WAS THERE A TIME WHEN YOU WERE NOT ABLE TO PAY THE MORTGAGE OR RENT ON TIME?: PATIENT DECLINED

## 2024-09-23 SDOH — SOCIAL STABILITY: SOCIAL INSECURITY
WITHIN THE LAST YEAR, HAVE TO BEEN RAPED OR FORCED TO HAVE ANY KIND OF SEXUAL ACTIVITY BY YOUR PARTNER OR EX-PARTNER?: NO

## 2024-09-23 SDOH — SOCIAL STABILITY: SOCIAL INSECURITY: WITHIN THE LAST YEAR, HAVE YOU BEEN AFRAID OF YOUR PARTNER OR EX-PARTNER?: NO

## 2024-09-23 SDOH — ECONOMIC STABILITY: INCOME INSECURITY
IN THE PAST 12 MONTHS, HAS THE ELECTRIC, GAS, OIL, OR WATER COMPANY THREATENED TO SHUT OFF SERVICE IN YOUR HOME?: PATIENT DECLINED

## 2024-09-23 SDOH — SOCIAL STABILITY: SOCIAL INSECURITY: HAVE YOU HAD THOUGHTS OF HARMING ANYONE ELSE?: NO

## 2024-09-23 SDOH — SOCIAL STABILITY: SOCIAL NETWORK: IN A TYPICAL WEEK, HOW MANY TIMES DO YOU TALK ON THE PHONE WITH FAMILY, FRIENDS, OR NEIGHBORS?: TWICE A WEEK

## 2024-09-23 SDOH — ECONOMIC STABILITY: INCOME INSECURITY: IN THE PAST 12 MONTHS, HAS THE ELECTRIC, GAS, OIL, OR WATER COMPANY THREATENED TO SHUT OFF SERVICE IN YOUR HOME?: NO

## 2024-09-23 SDOH — ECONOMIC STABILITY: TRANSPORTATION INSECURITY
IN THE PAST 12 MONTHS, HAS THE LACK OF TRANSPORTATION KEPT YOU FROM MEDICAL APPOINTMENTS OR FROM GETTING MEDICATIONS?: PATIENT DECLINED

## 2024-09-23 SDOH — SOCIAL STABILITY: SOCIAL INSECURITY: HAS ANYONE EVER THREATENED TO HURT YOUR FAMILY OR YOUR PETS?: NO

## 2024-09-23 SDOH — HEALTH STABILITY: PHYSICAL HEALTH
ON AVERAGE, HOW MANY DAYS PER WEEK DO YOU ENGAGE IN MODERATE TO STRENUOUS EXERCISE (LIKE A BRISK WALK)?: PATIENT DECLINED

## 2024-09-23 SDOH — SOCIAL STABILITY: SOCIAL INSECURITY: WITHIN THE LAST YEAR, HAVE YOU BEEN HUMILIATED OR EMOTIONALLY ABUSED IN OTHER WAYS BY YOUR PARTNER OR EX-PARTNER?: NO

## 2024-09-23 SDOH — SOCIAL STABILITY: SOCIAL NETWORK
DO YOU BELONG TO ANY CLUBS OR ORGANIZATIONS SUCH AS CHURCH GROUPS UNIONS, FRATERNAL OR ATHLETIC GROUPS, OR SCHOOL GROUPS?: PATIENT DECLINED

## 2024-09-23 SDOH — SOCIAL STABILITY: SOCIAL INSECURITY
WITHIN THE LAST YEAR, HAVE YOU BEEN KICKED, HIT, SLAPPED, OR OTHERWISE PHYSICALLY HURT BY YOUR PARTNER OR EX-PARTNER?: NO

## 2024-09-23 SDOH — HEALTH STABILITY: MENTAL HEALTH
STRESS IS WHEN SOMEONE FEELS TENSE, NERVOUS, ANXIOUS, OR CAN'T SLEEP AT NIGHT BECAUSE THEIR MIND IS TROUBLED. HOW STRESSED ARE YOU?: PATIENT DECLINED

## 2024-09-23 SDOH — HEALTH STABILITY: MENTAL HEALTH
HOW OFTEN DO YOU NEED TO HAVE SOMEONE HELP YOU WHEN YOU READ INSTRUCTIONS, PAMPHLETS, OR OTHER WRITTEN MATERIAL FROM YOUR DOCTOR OR PHARMACY?: NEVER

## 2024-09-23 SDOH — HEALTH STABILITY: PHYSICAL HEALTH: ON AVERAGE, HOW MANY MINUTES DO YOU ENGAGE IN EXERCISE AT THIS LEVEL?: PATIENT DECLINED

## 2024-09-23 SDOH — HEALTH STABILITY: MENTAL HEALTH: HOW OFTEN DO YOU HAVE 6 OR MORE DRINKS ON ONE OCCASION?: NEVER

## 2024-09-23 SDOH — HEALTH STABILITY: PHYSICAL HEALTH: ON AVERAGE, HOW MANY DAYS PER WEEK DO YOU ENGAGE IN MODERATE TO STRENUOUS EXERCISE (LIKE A BRISK WALK)?: 0 DAYS

## 2024-09-23 SDOH — HEALTH STABILITY: MENTAL HEALTH: HOW OFTEN DO YOU HAVE A DRINK CONTAINING ALCOHOL?: NEVER

## 2024-09-23 SDOH — ECONOMIC STABILITY: TRANSPORTATION INSECURITY
IN THE PAST 12 MONTHS, HAS LACK OF TRANSPORTATION KEPT YOU FROM MEETINGS, WORK, OR FROM GETTING THINGS NEEDED FOR DAILY LIVING?: PATIENT DECLINED

## 2024-09-23 SDOH — ECONOMIC STABILITY: INCOME INSECURITY: HOW HARD IS IT FOR YOU TO PAY FOR THE VERY BASICS LIKE FOOD, HOUSING, MEDICAL CARE, AND HEATING?: PATIENT DECLINED

## 2024-09-23 SDOH — SOCIAL STABILITY: SOCIAL INSECURITY: ABUSE: ADULT

## 2024-09-23 SDOH — SOCIAL STABILITY: SOCIAL NETWORK: HOW OFTEN DO YOU ATTEND CHURCH OR RELIGIOUS SERVICES?: PATIENT DECLINED

## 2024-09-23 SDOH — SOCIAL STABILITY: SOCIAL NETWORK: ARE YOU MARRIED, WIDOWED, DIVORCED, SEPARATED, NEVER MARRIED, OR LIVING WITH A PARTNER?: PATIENT DECLINED

## 2024-09-23 SDOH — SOCIAL STABILITY: SOCIAL INSECURITY: HAVE YOU HAD ANY THOUGHTS OF HARMING ANYONE ELSE?: NO

## 2024-09-23 SDOH — SOCIAL STABILITY: SOCIAL INSECURITY: DOES ANYONE TRY TO KEEP YOU FROM HAVING/CONTACTING OTHER FRIENDS OR DOING THINGS OUTSIDE YOUR HOME?: NO

## 2024-09-23 SDOH — SOCIAL STABILITY: SOCIAL NETWORK: HOW OFTEN DO YOU ATTENT MEETINGS OF THE CLUB OR ORGANIZATION YOU BELONG TO?: 1 TO 4 TIMES PER YEAR

## 2024-09-23 SDOH — ECONOMIC STABILITY: FOOD INSECURITY: WITHIN THE PAST 12 MONTHS, YOU WORRIED THAT YOUR FOOD WOULD RUN OUT BEFORE YOU GOT MONEY TO BUY MORE.: PATIENT DECLINED

## 2024-09-23 SDOH — SOCIAL STABILITY: SOCIAL INSECURITY: ARE THERE ANY APPARENT SIGNS OF INJURIES/BEHAVIORS THAT COULD BE RELATED TO ABUSE/NEGLECT?: NO

## 2024-09-23 SDOH — ECONOMIC STABILITY: HOUSING INSECURITY: IN THE PAST 12 MONTHS, HOW MANY TIMES HAVE YOU MOVED WHERE YOU WERE LIVING?: 0

## 2024-09-23 SDOH — SOCIAL STABILITY: SOCIAL INSECURITY: ARE YOU OR HAVE YOU BEEN THREATENED OR ABUSED PHYSICALLY, EMOTIONALLY, OR SEXUALLY BY ANYONE?: NO

## 2024-09-23 SDOH — SOCIAL STABILITY: SOCIAL NETWORK: HOW OFTEN DO YOU GET TOGETHER WITH FRIENDS OR RELATIVES?: TWICE A WEEK

## 2024-09-23 SDOH — SOCIAL STABILITY: SOCIAL NETWORK: HOW OFTEN DO YOU ATTENT MEETINGS OF THE CLUB OR ORGANIZATION YOU BELONG TO?: PATIENT DECLINED

## 2024-09-23 SDOH — HEALTH STABILITY: PHYSICAL HEALTH: ON AVERAGE, HOW MANY MINUTES DO YOU ENGAGE IN EXERCISE AT THIS LEVEL?: 0 MIN

## 2024-09-23 SDOH — SOCIAL STABILITY: SOCIAL NETWORK: IN A TYPICAL WEEK, HOW MANY TIMES DO YOU TALK ON THE PHONE WITH FAMILY, FRIENDS, OR NEIGHBORS?: PATIENT DECLINED

## 2024-09-23 SDOH — HEALTH STABILITY: MENTAL HEALTH: HOW MANY STANDARD DRINKS CONTAINING ALCOHOL DO YOU HAVE ON A TYPICAL DAY?: PATIENT DOES NOT DRINK

## 2024-09-23 SDOH — SOCIAL STABILITY: SOCIAL INSECURITY: WERE YOU ABLE TO COMPLETE ALL THE BEHAVIORAL HEALTH SCREENINGS?: YES

## 2024-09-23 SDOH — SOCIAL STABILITY: SOCIAL NETWORK: HOW OFTEN DO YOU GET TOGETHER WITH FRIENDS OR RELATIVES?: PATIENT DECLINED

## 2024-09-23 SDOH — SOCIAL STABILITY: SOCIAL INSECURITY: DO YOU FEEL UNSAFE GOING BACK TO THE PLACE WHERE YOU ARE LIVING?: NO

## 2024-09-23 ASSESSMENT — PAIN SCALES - GENERAL
PAINLEVEL_OUTOF10: 10 - WORST POSSIBLE PAIN
PAINLEVEL_OUTOF10: 8
PAIN_LEVEL: 1
PAINLEVEL_OUTOF10: 2
PAINLEVEL_OUTOF10: 8
PAINLEVEL_OUTOF10: 10 - WORST POSSIBLE PAIN
PAINLEVEL_OUTOF10: 7
PAINLEVEL_OUTOF10: 0 - NO PAIN
PAINLEVEL_OUTOF10: 10 - WORST POSSIBLE PAIN
PAINLEVEL_OUTOF10: 10 - WORST POSSIBLE PAIN
PAINLEVEL_OUTOF10: 0 - NO PAIN
PAINLEVEL_OUTOF10: 2
PAINLEVEL_OUTOF10: 7
PAINLEVEL_OUTOF10: 4
PAINLEVEL_OUTOF10: 7
PAINLEVEL_OUTOF10: 4

## 2024-09-23 ASSESSMENT — PAIN - FUNCTIONAL ASSESSMENT
PAIN_FUNCTIONAL_ASSESSMENT: 0-10
PAIN_FUNCTIONAL_ASSESSMENT: UNABLE TO SELF-REPORT
PAIN_FUNCTIONAL_ASSESSMENT: 0-10
PAIN_FUNCTIONAL_ASSESSMENT: UNABLE TO SELF-REPORT
PAIN_FUNCTIONAL_ASSESSMENT: 0-10
PAIN_FUNCTIONAL_ASSESSMENT: 0-10
PAIN_FUNCTIONAL_ASSESSMENT: UNABLE TO SELF-REPORT
PAIN_FUNCTIONAL_ASSESSMENT: 0-10
PAIN_FUNCTIONAL_ASSESSMENT: UNABLE TO SELF-REPORT
PAIN_FUNCTIONAL_ASSESSMENT: 0-10
PAIN_FUNCTIONAL_ASSESSMENT: UNABLE TO SELF-REPORT
PAIN_FUNCTIONAL_ASSESSMENT: 0-10
PAIN_FUNCTIONAL_ASSESSMENT: UNABLE TO SELF-REPORT
PAIN_FUNCTIONAL_ASSESSMENT: UNABLE TO SELF-REPORT
PAIN_FUNCTIONAL_ASSESSMENT: 0-10
PAIN_FUNCTIONAL_ASSESSMENT: 0-10
PAIN_FUNCTIONAL_ASSESSMENT: UNABLE TO SELF-REPORT
PAIN_FUNCTIONAL_ASSESSMENT: 0-10
PAIN_FUNCTIONAL_ASSESSMENT: 0-10
PAIN_FUNCTIONAL_ASSESSMENT: UNABLE TO SELF-REPORT

## 2024-09-23 ASSESSMENT — COGNITIVE AND FUNCTIONAL STATUS - GENERAL
MOBILITY SCORE: 20
MOVING FROM LYING ON BACK TO SITTING ON SIDE OF FLAT BED WITH BEDRAILS: A LITTLE
WALKING IN HOSPITAL ROOM: A LITTLE
DAILY ACTIVITIY SCORE: 23
PATIENT BASELINE BEDBOUND: NO
DRESSING REGULAR LOWER BODY CLOTHING: A LITTLE
CLIMB 3 TO 5 STEPS WITH RAILING: A LITTLE
TURNING FROM BACK TO SIDE WHILE IN FLAT BAD: A LITTLE

## 2024-09-23 ASSESSMENT — ACTIVITIES OF DAILY LIVING (ADL)
FEEDING YOURSELF: INDEPENDENT
ADEQUATE_TO_COMPLETE_ADL: YES
GROOMING: INDEPENDENT
BATHING: INDEPENDENT
HEARING - LEFT EAR: FUNCTIONAL
HEARING - RIGHT EAR: FUNCTIONAL
JUDGMENT_ADEQUATE_SAFELY_COMPLETE_DAILY_ACTIVITIES: YES
LACK_OF_TRANSPORTATION: PATIENT DECLINED
DRESSING YOURSELF: INDEPENDENT
TOILETING: INDEPENDENT
PATIENT'S MEMORY ADEQUATE TO SAFELY COMPLETE DAILY ACTIVITIES?: YES
WALKS IN HOME: INDEPENDENT

## 2024-09-23 ASSESSMENT — LIFESTYLE VARIABLES
SUBSTANCE_ABUSE_PAST_12_MONTHS: NO
HOW MANY STANDARD DRINKS CONTAINING ALCOHOL DO YOU HAVE ON A TYPICAL DAY: PATIENT DOES NOT DRINK
SKIP TO QUESTIONS 9-10: 1
SKIP TO QUESTIONS 9-10: 1
AUDIT-C TOTAL SCORE: 0
HOW OFTEN DO YOU HAVE A DRINK CONTAINING ALCOHOL: NEVER
PRESCIPTION_ABUSE_PAST_12_MONTHS: NO
HOW OFTEN DO YOU HAVE 6 OR MORE DRINKS ON ONE OCCASION: NEVER
AUDIT-C TOTAL SCORE: 0
AUDIT-C TOTAL SCORE: 0

## 2024-09-23 ASSESSMENT — PAIN DESCRIPTION - LOCATION: LOCATION: ABDOMEN

## 2024-09-23 ASSESSMENT — COLUMBIA-SUICIDE SEVERITY RATING SCALE - C-SSRS
1. IN THE PAST MONTH, HAVE YOU WISHED YOU WERE DEAD OR WISHED YOU COULD GO TO SLEEP AND NOT WAKE UP?: NO
2. HAVE YOU ACTUALLY HAD ANY THOUGHTS OF KILLING YOURSELF?: NO
6. HAVE YOU EVER DONE ANYTHING, STARTED TO DO ANYTHING, OR PREPARED TO DO ANYTHING TO END YOUR LIFE?: NO

## 2024-09-23 NOTE — ANESTHESIA PREPROCEDURE EVALUATION
Patient: Ayanna Willard    Procedure Information       Date/Time: 09/23/24 1115    Procedure: Gastric Sleeve Laparoscopic    Location: [facility] OR 14 / Virtual [facility] OR    Surgeons: Crow Navas MD            Relevant Problems   Anesthesia (within normal limits)      Cardiac   (+) Afib (Multi)   (+) Atypical chest pain      Pulmonary   (+) Obstructive sleep apnea syndrome      GI   (+) Gastroesophageal reflux disease   (+) Gastroesophageal reflux disease with esophagitis      /Renal   (+) Lower urinary tract infectious disease      Liver   (+) Fatty liver      Endocrine   (+) Class 2 obesity with alveolar hypoventilation, serious comorbidity, and body mass index (BMI) of 38.0 to 38.9 in adult   (+) Hypothyroidism   (+) Morbid obesity (Multi)   (+) Morbid obesity due to excess calories (Multi)   (+) Obesity, unspecified      Hematology   (+) Anemia      HEENT   (+) Sinusitis      ID   (+) Bacterial vaginosis   (+) Herpes genitalia   (+) Lower urinary tract infectious disease      Skin   (+) Eczema      GYN   (+) Familial cancer of breast (Multi)       Clinical information reviewed:   Tobacco  Allergies  Meds   Med Hx  Surg Hx   Fam Hx  Soc Hx        NPO Detail:  NPO/Void Status  Date of Last Liquid: 09/23/24  Time of Last Liquid: 0600  Date of Last Solid: 09/21/24  Time of Last Solid: 2300         Physical Exam    Airway  Mallampati: III  TM distance: >3 FB     Cardiovascular - normal exam     Dental    Pulmonary - normal exam     Abdominal            Anesthesia Plan    History of general anesthesia?: yes  History of complications of general anesthesia?: no    ASA 3     general     Anesthetic plan and risks discussed with patient.    Plan discussed with CAA.

## 2024-09-23 NOTE — ANESTHESIA POSTPROCEDURE EVALUATION
Patient: Ayanna Willard    Procedure Summary       Date: 09/23/24 Room / Location: [facility] OR 14 / [facility]    Anesthesia Start: 1135 Anesthesia Stop: 1344    Procedure: Gastric Sleeve Laparoscopic, EGD (Abdomen) Diagnosis:       Morbid obesity (Multi)      (Morbid obesity (Multi) [E66.01])    Surgeons: Crow Navas MD Responsible Provider: Lucio Khan MD    Anesthesia Type: general ASA Status: 3            Anesthesia Type: general    Vitals Value Taken Time   /69 09/23/24 1333   Temp 35.9 09/23/24 1345   Pulse 75 09/23/24 1343   Resp 10 09/23/24 1343   SpO2 100 % 09/23/24 1343   Vitals shown include unfiled device data.    Anesthesia Post Evaluation    Patient location during evaluation: PACU  Patient participation: complete - patient participated  Level of consciousness: awake  Pain score: 1  Pain management: adequate  Multimodal analgesia pain management approach  Airway patency: patent  Two or more strategies used to mitigate risk of obstructive sleep apnea  Cardiovascular status: acceptable  Respiratory status: acceptable, airway suctioned and face mask  Hydration status: acceptable  Postoperative Nausea and Vomiting: none      No notable events documented.

## 2024-09-23 NOTE — SIGNIFICANT EVENT
Postop Check    Ayanna Willard is a 49yo F s/p laparoscopic sleeve gastrectomy. Patient is doing well postoperatively. Patient's pain is well-controlled, tolerating tiny sips of clears. Patient denies N/V, fevers, chills, chest pain, and shortness of breath.    O:  Vitals:    09/23/24 1745   BP: (!) 140/97   Pulse: 82   Resp: 16   Temp:    SpO2: 96%       General: easily arousable, alert, conversive  Cardiovascular: RR   Respiratory/Thorax: even, unlabored on RA  Gastrointestinal: soft, appropriately tender, ND.  Genitourinary: voiding independently  Skin: warm, dry  Musculoskeletal: PINEDO  Extremities: no edema, SCDs on  Psychological: appropriate mood/affect    Assessment/Plan:    Ayanna Willard is a 49yo F s/p laparoscopic sleeve gastrectomy. Patient is doing well postoperatively.    - pain management with tylenol, toradol, oxy and dilaudid PRN  - diet sips of CLD  - UGI tomorrow morning  - PPI  - zofran PRN  - strict I&Os  - monitor for signs of bleeding  - continue intra-op ancef (2 more doses)  - continue management on RNF    Joana Gomez MD   General Surgery PGY3  [facility] [id number]

## 2024-09-23 NOTE — ANESTHESIA PROCEDURE NOTES
Airway  Date/Time: 9/23/2024 11:55 AM  Urgency: elective    Airway not difficult    Staffing  Performed: EMILY   Authorized by: Lucio Khan MD    Performed by: Lucy Palladino  Patient location during procedure: OR    Indications and Patient Condition  Indications for airway management: anesthesia      Final Airway Details  Final airway type: endotracheal airway         Successful intubation technique: video laryngoscopy  Endotracheal tube insertion site: oral  Blade: Bhaskar  Blade size: #3  Cormack-Lehane Classification: grade I - full view of glottis  Placement verified by: chest auscultation and capnometry   Measured from: lips  ETT to lips (cm): 21

## 2024-09-23 NOTE — OP NOTE
Gastric Sleeve Laparoscopic, EGD Operative Note     Date: 2024  OR Location: [facility] OR    Name: Ayanna Willard, : 1975, Age: 48 y.o., MRN: [id number], Sex: female    Diagnosis  Pre-op Diagnosis      * Morbid obesity (Multi) [E66.01] Post-op Diagnosis     * Morbid obesity (Multi) [E66.01]     Procedures  Gastric Sleeve Laparoscopic, EGD  17444 - NV LAPS GSTRC RSTRICTIV PX LONGITUDINAL GASTRECTOMY    NV EGD TRANSORAL BIOPSY SINGLE/MULTIPLE [24201]  Surgeons      * Crow Navas - Primary    Resident/Fellow/Other Assistant:  Surgeons and Role:     * Joana Gomez MD - Resident - Assisting    Procedure Summary  Anesthesia: General  ASA: III  Anesthesia Staff: Anesthesiologist: Lucio Khan MD  C-AA: KARLA Perdomo  EMILY: Lucy Palladino  Estimated Blood Loss: 25 mL  Intra-op Medications:   Administrations occurring from 1115 to 1310 on 24:   Medication Name Total Dose   sterile water irrigation solution 1,000 mL   BUPivacaine HCl (Marcaine) 0.5 % (5 mg/mL) injection 30 mL   sodium chloride 0.9 % irrigation solution 1,000 mL   surgical lubricant gel 1 Application   lactated Ringer's infusion Cannot be calculated   ceFAZolin (Ancef) injection  - Omnicell Override Pull Cannot be calculated   dexAMETHasone (Decadron) injection  - Omnicell Override Pull Cannot be calculated   esmolol (Brevibloc) injection  - Omnicell Override Pull Cannot be calculated   fentaNYL PF (Sublimaze) injection  - Omnicell Override Pull Cannot be calculated   heparin (porcine) injection 5,000 Units 5,000 Units   midazolam (Versed) injection  - Omnicell Override Pull Cannot be calculated   ondansetron (Zofran) injection  - Omnicell Override Pull Cannot be calculated   propofol (Diprivan) injection  - Omnicell Override Pull Cannot be calculated   propofol (Diprivan) injection  - Omnicell Override Pull Cannot be calculated   rocuronium (ZeMuron) injection  - Omnicell Override Pull Cannot be calculated              Anesthesia  Record               Intraprocedure I/O Totals       None           Specimen:   ID Type Source Tests Collected by Time   1 : STOMACH Tissue STOMACH SLEEVE RESECTION SURGICAL PATHOLOGY EXAM Crow Navas MD 9/23/2024 1938        Staff:   Relief Circulator: Joycelyn Smith Person: Matheus  Circulator: Amara Shaikh Scrub: Sanaz         Drains and/or Catheters: * None in log *    Tourniquet Times:         Implants:     Findings: normal anatomy, posterior adhesions, negative leak test, esophagitis     Indications: Ayanna Willard is an 48 y.o. female who is having surgery for Morbid obesity (Multi) [E66.01].     The patient was seen in the preoperative area. The risks, benefits, complications, treatment options, non-operative alternatives, expected recovery and outcomes were discussed with the patient. The possibilities of reaction to medication, pulmonary aspiration, injury to surrounding structures, bleeding, recurrent infection, the need for additional procedures, failure to diagnose a condition, and creating a complication requiring transfusion or operation were discussed with the patient. The patient concurred with the proposed plan, giving informed consent.  The site of surgery was properly noted/marked if necessary per policy. The patient has been actively warmed in preoperative area. Preoperative antibiotics have been ordered and given within 1 hours of incision. Venous thrombosis prophylaxis have been ordered including bilateral sequential compression devices and chemical prophylaxis    Procedure Details: Ms. Willard was scheduled for sleeve gastrectomy on elective basis after completing all requirements and obtaining appropriate clearances. On the day of surgery after verification of informed consent patient was given subcutaneous heparin, preoperative antibiotics were started, patient was taken to operating room and placed in supine position on the table. Surgical timeout was done. SCDs were placed. General  anesthesia was established with endotracheal intubation by anesthesiologist, standard, sterile preparation and draping of abdominal wall was performed.  Entry into abdominal cavity was obtained with 0 degree scope using 5 mm optical trocar in the left upper quadrant without technical problems. Pneumoperitoneum was established to 15 mmHg, there was no iatrogenic injuries at the entry site. Scope was switched with 30 degree.  12 mm port was placed in paraumbilical area and a 5 mm port was placed in the right upper quadrant. 5 mm port was placed in left axillary line. Left lobe of liver was retracted with Nathonson through epigastrium. Hiatus was inspected there was no evidence of gross hiatal hernia. Greater omentum was dissected with the LigaSure starting at the prepyloric area all the way to the fundus, gastric fundus was completely mobilized. Short gastric vessels were divided. Left ghazala was dissected to make sure there was no posterior hiatal hernia which there was none. There were significant posterior adhesions which were divided with ligasure during dissection.  A 36 fr visiGI tube was inserted by anesthesiologist and guided into pyloric channel. After this sleeve gastrectomy was performed. First green load was fired 5 cm from the pylorus on the outer edge of the bougie in the gastric antrum, then gold load against the incisura making sure no narrowing was created and then superiorly gold and blue loads were used all the way to the angle of His, leaving a cuff of gastric tissue. All loads were reinforced with seamguard. Staple line was inspected and no technical issues were noted. Pylorus was occluded and air was insufflated through the ViSiGi-tube. Sleeve was submerged underwater there was good distention of the sleeve and there was no evidence of any air bubbles. The tube was removed and an endoscopy was performed with negative leak test and no internal active bleeding and there was no technical issues in the  lumen of the sleeve. There was significant esophagitis however noted. Fluid was aspirated. Tap block was performed with marcaine mixture on both sides. Specimen was retrieved through the 15 mm port site and the fascial defect was closed with interrupted 0 Vicryl sutures using Endo passer. Final satisfactory examination abdominal cavity was performed, there was no iatrogenic injuries or any active bleeding. Trocars were removed and pneumoperitoneum was discontinued. Specimen extraction site was copiously irrigated. Skin was closed with 3-0 Monocril. Dermabond adhesive was applied. Patient tolerated the operation well and instrument, needle, sponge counts were correct at conclusion of operation.     Complications:  None; patient tolerated the procedure well.    Disposition: PACU - hemodynamically stable.  Condition: stable         Additional Details:     Attending Attestation: I was present and scrubbed for the entire procedure.    Crow Navas  Phone Number: [phone]

## 2024-09-24 ENCOUNTER — APPOINTMENT (OUTPATIENT)
Dept: RADIOLOGY | Facility: HOSPITAL | Age: 49
DRG: 621 | End: 2024-09-24
Payer: COMMERCIAL

## 2024-09-24 VITALS
WEIGHT: 230.82 LBS | TEMPERATURE: 97.2 F | HEART RATE: 84 BPM | HEIGHT: 65 IN | BODY MASS INDEX: 38.46 KG/M2 | DIASTOLIC BLOOD PRESSURE: 77 MMHG | RESPIRATION RATE: 18 BRPM | OXYGEN SATURATION: 93 % | SYSTOLIC BLOOD PRESSURE: 116 MMHG

## 2024-09-24 LAB
ALBUMIN SERPL BCP-MCNC: 4.3 G/DL (ref 3.4–5)
ALP SERPL-CCNC: 56 U/L (ref 33–110)
ALT SERPL W P-5'-P-CCNC: 93 U/L (ref 7–45)
ANION GAP SERPL CALC-SCNC: 15 MMOL/L (ref 10–20)
AST SERPL W P-5'-P-CCNC: 55 U/L (ref 9–39)
BASOPHILS # BLD AUTO: 0.01 X10*3/UL (ref 0–0.1)
BASOPHILS NFR BLD AUTO: 0.1 %
BILIRUB SERPL-MCNC: 0.5 MG/DL (ref 0–1.2)
BUN SERPL-MCNC: 7 MG/DL (ref 6–23)
CALCIUM SERPL-MCNC: 8.9 MG/DL (ref 8.6–10.6)
CHLORIDE SERPL-SCNC: 102 MMOL/L (ref 98–107)
CO2 SERPL-SCNC: 23 MMOL/L (ref 21–32)
CREAT SERPL-MCNC: 0.71 MG/DL (ref 0.5–1.05)
EGFRCR SERPLBLD CKD-EPI 2021: >90 ML/MIN/1.73M*2
EOSINOPHIL # BLD AUTO: 0 X10*3/UL (ref 0–0.7)
EOSINOPHIL NFR BLD AUTO: 0 %
ERYTHROCYTE [DISTWIDTH] IN BLOOD BY AUTOMATED COUNT: 13.4 % (ref 11.5–14.5)
GLUCOSE SERPL-MCNC: 118 MG/DL (ref 74–99)
HCT VFR BLD AUTO: 41.2 % (ref 36–46)
HGB BLD-MCNC: 13.7 G/DL (ref 12–16)
IMM GRANULOCYTES # BLD AUTO: 0.03 X10*3/UL (ref 0–0.7)
IMM GRANULOCYTES NFR BLD AUTO: 0.3 % (ref 0–0.9)
LYMPHOCYTES # BLD AUTO: 1.42 X10*3/UL (ref 1.2–4.8)
LYMPHOCYTES NFR BLD AUTO: 15.1 %
MAGNESIUM SERPL-MCNC: 2.02 MG/DL (ref 1.6–2.4)
MCH RBC QN AUTO: 27.6 PG (ref 26–34)
MCHC RBC AUTO-ENTMCNC: 33.3 G/DL (ref 32–36)
MCV RBC AUTO: 83 FL (ref 80–100)
MONOCYTES # BLD AUTO: 0.82 X10*3/UL (ref 0.1–1)
MONOCYTES NFR BLD AUTO: 8.7 %
NEUTROPHILS # BLD AUTO: 7.1 X10*3/UL (ref 1.2–7.7)
NEUTROPHILS NFR BLD AUTO: 75.8 %
NRBC BLD-RTO: 0 /100 WBCS (ref 0–0)
PLATELET # BLD AUTO: 269 X10*3/UL (ref 150–450)
POTASSIUM SERPL-SCNC: 3.9 MMOL/L (ref 3.5–5.3)
PROT SERPL-MCNC: 7 G/DL (ref 6.4–8.2)
RBC # BLD AUTO: 4.97 X10*6/UL (ref 4–5.2)
SODIUM SERPL-SCNC: 136 MMOL/L (ref 136–145)
WBC # BLD AUTO: 9.4 X10*3/UL (ref 4.4–11.3)

## 2024-09-24 PROCEDURE — 85025 COMPLETE CBC W/AUTO DIFF WBC: CPT

## 2024-09-24 PROCEDURE — 2500000001 HC RX 250 WO HCPCS SELF ADMINISTERED DRUGS (ALT 637 FOR MEDICARE OP)

## 2024-09-24 PROCEDURE — 2550000001 HC RX 255 CONTRASTS: Performed by: SURGERY

## 2024-09-24 PROCEDURE — 80053 COMPREHEN METABOLIC PANEL: CPT

## 2024-09-24 PROCEDURE — 2500000005 HC RX 250 GENERAL PHARMACY W/O HCPCS: Performed by: NURSE PRACTITIONER

## 2024-09-24 PROCEDURE — 36415 COLL VENOUS BLD VENIPUNCTURE: CPT

## 2024-09-24 PROCEDURE — 2500000001 HC RX 250 WO HCPCS SELF ADMINISTERED DRUGS (ALT 637 FOR MEDICARE OP): Performed by: NURSE PRACTITIONER

## 2024-09-24 PROCEDURE — 2500000004 HC RX 250 GENERAL PHARMACY W/ HCPCS (ALT 636 FOR OP/ED)

## 2024-09-24 PROCEDURE — 74240 X-RAY XM UPR GI TRC 1CNTRST: CPT

## 2024-09-24 PROCEDURE — 83735 ASSAY OF MAGNESIUM: CPT

## 2024-09-24 PROCEDURE — 94760 N-INVAS EAR/PLS OXIMETRY 1: CPT

## 2024-09-24 PROCEDURE — 99024 POSTOP FOLLOW-UP VISIT: CPT | Performed by: NURSE PRACTITIONER

## 2024-09-24 PROCEDURE — 74240 X-RAY XM UPR GI TRC 1CNTRST: CPT | Performed by: RADIOLOGY

## 2024-09-24 PROCEDURE — 2500000005 HC RX 250 GENERAL PHARMACY W/O HCPCS

## 2024-09-24 RX ORDER — OXYCODONE HCL 5 MG/5 ML
5 SOLUTION, ORAL ORAL EVERY 6 HOURS PRN
Start: 2024-09-24

## 2024-09-24 RX ORDER — LIDOCAINE 560 MG/1
1 PATCH PERCUTANEOUS; TOPICAL; TRANSDERMAL DAILY
Status: DISCONTINUED | OUTPATIENT
Start: 2024-09-24 | End: 2024-09-24 | Stop reason: HOSPADM

## 2024-09-24 RX ORDER — OXYCODONE HCL 5 MG/5 ML
10 SOLUTION, ORAL ORAL EVERY 6 HOURS PRN
Status: DISCONTINUED | OUTPATIENT
Start: 2024-09-24 | End: 2024-09-24 | Stop reason: HOSPADM

## 2024-09-24 RX ORDER — DIATRIZOATE MEGLUMINE AND DIATRIZOATE SODIUM 660; 100 MG/ML; MG/ML
60 SOLUTION ORAL; RECTAL ONCE
Status: COMPLETED | OUTPATIENT
Start: 2024-09-24 | End: 2024-09-24

## 2024-09-24 ASSESSMENT — PAIN - FUNCTIONAL ASSESSMENT
PAIN_FUNCTIONAL_ASSESSMENT: 0-10

## 2024-09-24 ASSESSMENT — PAIN SCALES - GENERAL
PAINLEVEL_OUTOF10: 8
PAINLEVEL_OUTOF10: 4
PAINLEVEL_OUTOF10: 8
PAINLEVEL_OUTOF10: 0 - NO PAIN
PAINLEVEL_OUTOF10: 0 - NO PAIN
PAINLEVEL_OUTOF10: 5 - MODERATE PAIN
PAINLEVEL_OUTOF10: 6
PAINLEVEL_OUTOF10: 6
PAINLEVEL_OUTOF10: 8
PAINLEVEL_OUTOF10: 0 - NO PAIN
PAINLEVEL_OUTOF10: 7
PAINLEVEL_OUTOF10: 7
PAINLEVEL_OUTOF10: 4

## 2024-09-24 ASSESSMENT — PAIN DESCRIPTION - LOCATION
LOCATION: ABDOMEN

## 2024-09-24 ASSESSMENT — PAIN DESCRIPTION - DESCRIPTORS
DESCRIPTORS: ACHING
DESCRIPTORS: ACHING

## 2024-09-24 NOTE — PROGRESS NOTES
"Ayanna Willard is a 48 y.o. female on day 1 of admission presenting with Morbid obesity (Multi). S/p laparoscopic gastric sleeve, EGD on 9/23     Subjective   Reports pain in epigastric area that improves with PRN oxycodone. Not yet passing flatus. Having intermittent nausea with x1 small emesis this AM.     Objective     Physical Exam  Vitals reviewed.   Constitutional:       General: She is not in acute distress.  Cardiovascular:      Rate and Rhythm: Normal rate and regular rhythm.   Pulmonary:      Effort: Pulmonary effort is normal. No respiratory distress.      Comments: RA  Abdominal:      General: There is no distension.      Palpations: Abdomen is soft.      Comments: Obese, appropriately tender to palpation, abdominal lap sites with dermabond, well approximated.    Skin:     General: Skin is warm and dry.   Neurological:      Mental Status: She is alert and oriented to person, place, and time.       Last Recorded Vitals  Blood pressure 131/86, pulse 99, temperature 37.5 °C (99.5 °F), temperature source Temporal, resp. rate 18, height 1.651 m (5' 5\"), weight 105 kg (230 lb 13.2 oz), SpO2 98%.  Intake/Output last 3 Shifts:  I/O last 3 completed shifts:  In: 1535 (14.7 mL/kg) [I.V.:1535 (14.7 mL/kg)]  Out: 2050 (19.6 mL/kg) [Urine:2050 (0.5 mL/kg/hr)]  Weight: 104.7 kg     Relevant Results  Scheduled medications  acetaminophen, 1,000 mg, intravenous, q6h CARRIE  ceFAZolin, , ,   ceFAZolin, , ,   dexAMETHasone, , ,   pantoprazole, 40 mg, oral, Daily before breakfast   Or  esomeprazole, 40 mg, nasoduodenal tube, Daily before breakfast   Or  pantoprazole, 40 mg, intravenous, Daily before breakfast  heparin (porcine), 5,000 Units, subcutaneous, q8h  ketorolac, 15 mg, intravenous, q6h CARRIE  lidocaine (cardiac), , ,   [Held by provider] metoprolol succinate XL, 25 mg, oral, Daily  metoprolol tartrate, 5 mg, intravenous, q6h  ondansetron, , ,   rocuronium, , ,   scopolamine, 1 patch, transdermal, Once      Continuous " medications  lactated Ringer's, 20 mL/hr  lactated Ringer's, 150 mL/hr, Last Rate: 150 mL/hr (09/24/24 0715)      PRN medications  PRN medications: ceFAZolin, ceFAZolin, dexAMETHasone, HYDROmorphone, lidocaine (cardiac), naloxone, ondansetron, ondansetron **OR** ondansetron, oxyCODONE, oxyCODONE, oxygen, promethazine, rocuronium, simethicone    Results for orders placed or performed during the hospital encounter of 09/23/24 (from the past 24 hour(s))   Type And Screen   Result Value Ref Range    ABO TYPE O     Rh TYPE POS     ANTIBODY SCREEN NEG    Comprehensive metabolic panel   Result Value Ref Range    Glucose 118 (H) 74 - 99 mg/dL    Sodium 136 136 - 145 mmol/L    Potassium 3.9 3.5 - 5.3 mmol/L    Chloride 102 98 - 107 mmol/L    Bicarbonate 23 21 - 32 mmol/L    Anion Gap 15 10 - 20 mmol/L    Urea Nitrogen 7 6 - 23 mg/dL    Creatinine 0.71 0.50 - 1.05 mg/dL    eGFR >90 >60 mL/min/1.73m*2    Calcium 8.9 8.6 - 10.6 mg/dL    Albumin 4.3 3.4 - 5.0 g/dL    Alkaline Phosphatase 56 33 - 110 U/L    Total Protein 7.0 6.4 - 8.2 g/dL    AST 55 (H) 9 - 39 U/L    Bilirubin, Total 0.5 0.0 - 1.2 mg/dL    ALT 93 (H) 7 - 45 U/L   CBC and Auto Differential   Result Value Ref Range    WBC 9.4 4.4 - 11.3 x10*3/uL    nRBC 0.0 0.0 - 0.0 /100 WBCs    RBC 4.97 4.00 - 5.20 x10*6/uL    Hemoglobin 13.7 12.0 - 16.0 g/dL    Hematocrit 41.2 36.0 - 46.0 %    MCV 83 80 - 100 fL    MCH 27.6 26.0 - 34.0 pg    MCHC 33.3 32.0 - 36.0 g/dL    RDW 13.4 11.5 - 14.5 %    Platelets 269 150 - 450 x10*3/uL    Neutrophils % 75.8 40.0 - 80.0 %    Immature Granulocytes %, Automated 0.3 0.0 - 0.9 %    Lymphocytes % 15.1 13.0 - 44.0 %    Monocytes % 8.7 2.0 - 10.0 %    Eosinophils % 0.0 0.0 - 6.0 %    Basophils % 0.1 0.0 - 2.0 %    Neutrophils Absolute 7.10 1.20 - 7.70 x10*3/uL    Immature Granulocytes Absolute, Automated 0.03 0.00 - 0.70 x10*3/uL    Lymphocytes Absolute 1.42 1.20 - 4.80 x10*3/uL    Monocytes Absolute 0.82 0.10 - 1.00 x10*3/uL    Eosinophils  Absolute 0.00 0.00 - 0.70 x10*3/uL    Basophils Absolute 0.01 0.00 - 0.10 x10*3/uL   Magnesium   Result Value Ref Range    Magnesium 2.02 1.60 - 2.40 mg/dL        Assessment/Plan   Assessment & Plan  Morbid obesity (Multi)    Morbid obesity due to excess calories (Multi)      48 year old female with h/o morbid obesity s/p laparoscopic gastric sleeve, EGD on 9/23 with Dr Navas.     PLAN:   Neurology: post operative pain  - scheduled tylenol   - oxycodone 5/10 PRN moderate-severe pain   - Dilaudid PRN available for breakthrough pain   - toradol x 6 doses   - lidocaine patch     Cardiovascular:  -Vital signs every 4 hours    Pulmonology:  -IS x10 every hour   -Maintain SpO2 >92 % RA     GI: s/p laparoscopic gastric sleeve   - NPO with 2 oz sips water until cleared  - Upper GI study this AM, advance to bariatric diet pending results   - Zofran/Phenergan PRN nausea, scopolamine patch   - PPI daily  - simethicone PRN     :  - Strict I&O   - Trend RFP and Magnesium, replace electrolytes as needed to maintain K >4, Mag >2.   -mIVF: LR @ 150mL/hr     ID: afebrile   -Perioperative antibiotics completed    Heme:  -CBC as needed       DVT Prophylaxis:  -Continue subcutaneous heparin, SCDs, ambulation/OOB     Disposition:  - RNF  - Possible DC home today pending overall progress      Discussed with surgical team; attending Dr Navas.     Shaylee Veronica, APRN, CNP  [facility]  [phone]

## 2024-09-24 NOTE — NURSING NOTE
4 Eyes Skin Assessment    Reason for assessment? Weekly skin assessment   Does the patient have a wound? no  Wound RN consult placed? N/A  Preventative foam dressing applied? No      Four eyes skin check performed with Jazmyne Hernandez RN

## 2024-09-24 NOTE — CARE PLAN
The patient's goals for the shift include      The clinical goals for the shift include reports tolerable pain level of 4/10 and tolerates full liquid diet of 6 of 8 30ml cups of liquid per hour x3.

## 2024-09-24 NOTE — CARE PLAN
Problem: Pain - Adult  Goal: Verbalizes/displays adequate comfort level or baseline comfort level  9/24/2024 1341 by Jazmyne Hernnadez RN  Outcome: Progressing  9/24/2024 1337 by Jazmyne Hernandez RN  Outcome: Progressing     Problem: Safety - Adult  Goal: Free from fall injury  Outcome: Met     Problem: Discharge Planning  Goal: Discharge to home or other facility with appropriate resources  9/24/2024 1341 by Jazmyne Hernandez RN  Outcome: Progressing  9/24/2024 1337 by Jazmyne Hernandez RN  Outcome: Progressing     Problem: Chronic Conditions and Co-morbidities  Goal: Patient's chronic conditions and co-morbidity symptoms are monitored and maintained or improved  9/24/2024 1341 by Jazmyne Hernandez RN  Outcome: Progressing  9/24/2024 1337 by Jazmyne Hernandez RN  Outcome: Progressing     Problem: Fall/Injury  Goal: Not fall by end of shift  Outcome: Met  Goal: Be free from injury by end of the shift  Outcome: Met  Goal: Verbalize understanding of personal risk factors for fall in the hospital  Outcome: Met  Goal: Verbalize understanding of risk factor reduction measures to prevent injury from fall in the home  Outcome: Met  Goal: Use assistive devices by end of the shift  Outcome: Met  Goal: Pace activities to prevent fatigue by end of the shift  Outcome: Met     Problem: Pain  Goal: Takes deep breaths with improved pain control throughout the shift  9/24/2024 1341 by Jazmyne Hernandez RN  Outcome: Met  9/24/2024 1337 by Jazmyne Hernandez RN  Outcome: Progressing  Goal: Turns in bed with improved pain control throughout the shift  Outcome: Met  Goal: Walks with improved pain control throughout the shift  Outcome: Met  Goal: Performs ADL's with improved pain control throughout shift  Outcome: Met  Goal: Free from opioid side effects throughout the shift  Outcome: Met  Goal: Free from acute confusion related to pain meds throughout the shift  Outcome: Met   The patient's goals for the shift include      The clinical goals for the  shift include reports tolerable pain of 4/10 throughout shift  .

## 2024-09-24 NOTE — DISCHARGE INSTRUCTIONS
Medications:  -Medications should be crushed and mixed with full liquids. Capsules may be opened and mixed with full liquids.  -Extended release medications should not be taken. Please contact your primary care physician to convert extended release medications to immediate release form.  -Take 650mg Tylenol every 6 hours for pain. Take between doses of your opioid (oxycodone) pain medication. Try to limit the use of your opioid pain medication and only take as needed.  - Take omeprazole daily as prescribed- open capsules and sprinkle over applesauce or pudding.   - Take zofran tablets as needed to prevent nausea or vomiting.   -Slowly add your vitamins to your daily medication regimen as tolerated. You do not have to take them within the first few days after surgery if they are causing you nausea or other problems.  -If you have medications that you still cannot tolerate by your first visit with your surgeon or dietitian, please discuss this.

## 2024-09-24 NOTE — CARE PLAN
The patient's goals for the shift include  pain control    The clinical goals for the shift include pain control throughout shift      Problem: Pain - Adult  Goal: Verbalizes/displays adequate comfort level or baseline comfort level  Outcome: Progressing     Problem: Safety - Adult  Goal: Free from fall injury  Outcome: Progressing     Problem: Discharge Planning  Goal: Discharge to home or other facility with appropriate resources  Outcome: Progressing     Problem: Chronic Conditions and Co-morbidities  Goal: Patient's chronic conditions and co-morbidity symptoms are monitored and maintained or improved  Outcome: Progressing     Problem: Fall/Injury  Goal: Not fall by end of shift  Outcome: Progressing  Goal: Be free from injury by end of the shift  Outcome: Progressing  Goal: Verbalize understanding of personal risk factors for fall in the hospital  Outcome: Progressing  Goal: Verbalize understanding of risk factor reduction measures to prevent injury from fall in the home  Outcome: Progressing  Goal: Use assistive devices by end of the shift  Outcome: Progressing  Goal: Pace activities to prevent fatigue by end of the shift  Outcome: Progressing     Problem: Diabetes  Goal: Achieve decreasing blood glucose levels by end of shift  Outcome: Progressing  Goal: Increase stability of blood glucose readings by end of shift  Outcome: Progressing  Goal: Decrease in ketones present in urine by end of shift  Outcome: Progressing  Goal: Maintain electrolyte levels within acceptable range throughout shift  Outcome: Progressing  Goal: Maintain glucose levels >70mg/dl to <250mg/dl throughout shift  Outcome: Progressing  Goal: No changes in neurological exam by end of shift  Outcome: Progressing  Goal: Learn about and adhere to nutrition recommendations by end of shift  Outcome: Progressing  Goal: Vital signs within normal range for age by end of shift  Outcome: Progressing  Goal: Increase self care and/or family involovement  by end of shift  Outcome: Progressing  Goal: Receive DSME education by end of shift  Outcome: Progressing     Problem: Pain  Goal: Takes deep breaths with improved pain control throughout the shift  Outcome: Progressing  Goal: Turns in bed with improved pain control throughout the shift  Outcome: Progressing  Goal: Walks with improved pain control throughout the shift  Outcome: Progressing  Goal: Performs ADL's with improved pain control throughout shift  Outcome: Progressing  Goal: Participates in PT with improved pain control throughout the shift  Outcome: Progressing  Goal: Free from opioid side effects throughout the shift  Outcome: Progressing  Goal: Free from acute confusion related to pain meds throughout the shift  Outcome: Progressing     Problem: Skin  Goal: Decreased wound size/increased tissue granulation at next dressing change  Outcome: Progressing  Goal: Participates in plan/prevention/treatment measures  Outcome: Progressing  Goal: Prevent/manage excess moisture  Outcome: Progressing  Goal: Prevent/minimize sheer/friction injuries  Outcome: Progressing  Goal: Promote/optimize nutrition  Outcome: Progressing  Goal: Promote skin healing  Outcome: Progressing

## 2024-09-24 NOTE — CONSULTS
Nutrition Diet Education:   Nutrition Assessment    Reason for Assessment: Provider consult order (Review post-op bariatric diet with patient.)    Patient is a 48 y.o. female S/p laparoscopic gastric sleeve, EGD on 9/23       Dietary Orders (From admission, onward)       Start     Ordered    09/24/24 0800  Adult diet Bariatric Full Liquid  Diet effective now        Comments: After completion of x-ray esophogram and cleared.  Post op day 1: Must use medicine cups, no straws. No caffeine, no carbonation.  Gastric bypass/revision/DS:  Up to 4 oz per hour (2 oz full and 2 oz clear).  Gastric sleeve:  Up to 8 oz per hour (4 oz full and 4 oz clear)    Post op day 2: Must use medicine cups, no straws. No caffeine, no carbonation.  All procedures:  Up to 8 oz per hour (4 oz full and 4 oz clear)   Question:  Diet type  Answer:  Bariatric Full Liquid    09/23/24 2135 09/23/24 2208  May Participate in Room Service  Once        Question:  .  Answer:  Yes    09/23/24 2208 09/23/24 2136  Ensure High Protein Supplement  Until discontinued        Comments: 2 times a day   Question Answer Comment   Deliver with Lunch    Deliver with Dinner    Select supplement: Ensure High Protein        09/23/24 2135                     Nutrition Education:      Post gastric sleeve diet guidelines reviewed with patient. No straws, carbonation or caffeine. Goal to sip slowly up to 4-8oz per hour and as tolerated work up to 64oz per day/70g protein with sugar free liquids/protein containing full liquids. Make sure to have adequate supply of liquids, protein shakes and vitamin/mineral supplements upon discharge. Patient voiced understanding of nutrition plan and has needed supplies at home.         Time Spent (min): 30 minutes

## 2024-09-24 NOTE — PROGRESS NOTES
TCC admission assessment completed. Explained role of TCC - verbalized understanding.     Demographics/Insurance: Confirmed in chart with patient.   Living Environment: Lives with son.  Primary Support Person: SonJeronimo, [phone]  PCP: Dr. Hicks  Recent Falls: Denies  Assistive Devices/DME: Denies  Home Oxygen: Denies  Dialysis: Denies  Home Care Agency: Denies  Transportation at Discharge: Patient's sister will transport patient home at discharge.   Pharmacy: [facility] in [place]  Concerns about discharge: None at this. Anticipate discharge today home with no needs.     Lelo Sandhu RN, TCC

## 2024-09-24 NOTE — HOSPITAL COURSE
48 year old female with h/o morbid obesity presented for elective laparoscopic gastric sleeve, EGD on 9/23 with Dr Navas. Procedure was tolerated well. UGI study completed on POD 1 without evidence of leak. Diet was advanced to include bariatric full and clear liquids. Upon discharge, pain was controlled, tolerating a minimum of 6 oz liquids per hour, ambulating, and voiding without difficulty. She was discharged home with outpatient surgical follow up scheduled.

## 2024-09-24 NOTE — DISCHARGE SUMMARY
Discharge Diagnosis  Morbid obesity (Multi)    Issues Requiring Follow-Up  Postoperative follow up visit    Test Results Pending At Discharge  Pending Labs       Order Current Status    Surgical Pathology Exam In process            Hospital Course  48 year old female with h/o morbid obesity presented for elective laparoscopic gastric sleeve, EGD on 9/23 with Dr Navas. Procedure was tolerated well. UGI study completed on POD 1 without evidence of leak. Diet was advanced to include bariatric full and clear liquids. Upon discharge, pain was controlled, tolerating a minimum of 6 oz liquids per hour, ambulating, and voiding without difficulty. She was discharged home with outpatient surgical follow up scheduled.    Pertinent Physical Exam At Time of Discharge  Physical Exam  Constitutional:       General: She is not in acute distress.  Cardiovascular:      Rate and Rhythm: Normal rate and regular rhythm.   Pulmonary:      Effort: Pulmonary effort is normal. No respiratory distress.      Comments: RA  Abdominal:      General: There is no distension.      Palpations: Abdomen is soft.      Comments: Obese, appropriately tender to palpation, abdominal lap sites with dermabond, well approximated.    Skin:     General: Skin is warm and dry.   Neurological:      Mental Status: She is alert and oriented to person, place, and time.     Home Medications     Medication List      START taking these medications     omeprazole 40 mg DR capsule; Commonly known as: PriLOSEC; Take 1 capsule   (40 mg) by mouth once daily in the morning. Take before meals. Do not   crush or chew. Open capsule, sprinkle beads on SF jello, pudding or   applesauce.   ondansetron ODT 4 mg disintegrating tablet; Commonly known as:   Zofran-ODT; Take 1 tablet (4 mg) by mouth every 6 hours if needed for   nausea or vomiting.     CONTINUE taking these medications     cyanocobalamin 1,000 mcg/mL injection; Commonly known as: Vitamin B-12;   INJECT 1 ML (1,000 MCG)  INTO THE MUSCLE EVERY 30 (THIRTY) DAYS.   ergocalciferol 1.25 MG (40687 UT) capsule; Commonly known as: Vitamin   D-2; TAKE 1 CAPSULE (60518 UNITS) BY MOUTH ONE TIME PER WEEK   metoprolol succinate XL 25 mg 24 hr tablet; Commonly known as:   Toprol-XL; Take 1 tablet (25 mg) by mouth once daily.   oxyCODONE 5 mg/5 mL solution; Commonly known as: Roxicodone; Take 5 mL   (5 mg) by mouth every 6 hours if needed for severe pain (7 - 10) or   moderate pain (4 - 6).     STOP taking these medications     pantoprazole 40 mg EC tablet; Commonly known as: ProtoNix     ASK your doctor about these medications     estradioL 10 mcg insert, dose pack; Insert vaginally daily for 14 days   followed by 2 times/week   oxybutynin XL 5 mg 24 hr tablet; Commonly known as: Ditropan-XL; Take 1   tablet (5 mg) by mouth once daily. Do not crush, chew, or split.       Outpatient Follow-Up  Future Appointments   Date Time Provider Department [place]   10/8/2024 11:15 AM Crow Navas MD [id number] [facility]   10/8/2024  1:30 PM Sandy Toribio RD [id number] [facility]   10/17/2024  9:15 AM Macho Hicks MD [id number] [facility]   11/5/2024 11:00 AM Crow Navas MD [id number] [facility]   11/5/2024  1:00 PM Sandy Toribio RD [id number] [facility]   12/24/2024  8:30 AM Justina Huang RD [id number] [place]   12/24/2024 10:00 AM Jerri Modi APRN-CNP [id number] [facility]   1/20/2025  2:00 PM Martinez Garg MD [id number] [facility]       Joana Gomez MD

## 2024-09-27 NOTE — PROGRESS NOTES
"Post op call to patient. She states she is \"doing okay.\" She feels she can't take in a lot due to taste. She estimates taking in about 6 ounces of fluid hourly. She is taking in yogurt and applesauce. She is walking regularly. She is taking oxycodone if needed for pain. She is taking her omeprazole granules. She states her bowels are moving with last BM yesterday. She denies nausea. She has not started her vitamins yet. She has clinic contact information and on-call numbers. Reviewed follow up appointments. All questions answered at this time. Encouraged to call with questions.   "

## 2024-09-30 NOTE — PROGRESS NOTES
/BARIATRIC SURGERY CLINIC  FOLLOW UP NOTE      Name: Ayanna Willard  MRN: [id number]      Index Surgery  Date of Surgery: 9/23/24   Surgeon: Dr. Navas  Surgical Procedure: Laparoscopic sleeve gastrectomy  34350  Initial weight: 228 lb  Pre-surgical weight: 234 lb        Visit: 1  week  Today's Visit:   Wt Readings from Last 1 Encounters:   09/24/24 105 kg (230 lb 13.2 oz)    There is no height or weight on file to calculate BMI.   Last Visit:    Wt Readings from Last 3 Encounters:   09/24/24 105 kg (230 lb 13.2 oz)   09/10/24 106 kg (234 lb 9.6 oz)   09/06/24 108 kg (238 lb 1.6 oz)       Pt is here for 1 week follow up on sleeve gastrectomy.     DIET INTAKE: Liquid    Diet History:   Carbonated Beverages: no  Time to eat meals: 60 min  Fluid intake: 32 oz/day  Breakfast: protein shake, apple sauce, yogurt  Lunch: yogurt  Dinner: yogurt  Snacks: applesauce    DAILY SUPPLEMENTS:  Calcium: Calcium Citrate w/ vitamin D (1200 - 1500mg)  Multivitamin & Minerals: 2 per day  Iron Supplement: included in multi-vitamin  Vitamin B12: 1000 mcg  Vitamin D3: 3000 units  Other: none  PPI: omeprazole    EXERCISE: yes    Symptoms:   Appetite lost: sometimes   Experiencing hunger: sometimes   Nausea/vomiting: no   Food intolerance: protein shakes   Constipation: no   Diarrhea: no   Experiencing dumping: no   Abdominal pain: no   Reflux: yes Are you on medications: yes      Current Outpatient Medications   Medication Sig Dispense Refill    cyanocobalamin (Vitamin B-12) 1,000 mcg/mL injection INJECT 1 ML (1,000 MCG) INTO THE MUSCLE EVERY 30 (THIRTY) DAYS. 9 mL 1    ergocalciferol (Vitamin D-2) 1.25 MG (29733 UT) capsule TAKE 1 CAPSULE (73629 UNITS) BY MOUTH ONE TIME PER WEEK 13 capsule 0    estradioL 10 mcg insert, dose pack Insert vaginally daily for 14 days followed by 2 times/week (Patient not taking: Reported on 9/20/2024) 18 each 0    metoprolol succinate XL (Toprol-XL) 25 mg 24 hr tablet Take 1 tablet (25 mg) by mouth once daily.  "90 tablet 3    omeprazole (PriLOSEC) 40 mg DR capsule Take 1 capsule (40 mg) by mouth once daily in the morning. Take before meals. Do not crush or chew. Open capsule, sprinkle beads on SF jello, pudding or applesauce. 30 capsule 5    ondansetron ODT (Zofran-ODT) 4 mg disintegrating tablet Take 1 tablet (4 mg) by mouth every 6 hours if needed for nausea or vomiting. 60 tablet 1    oxybutynin XL (Ditropan-XL) 5 mg 24 hr tablet Take 1 tablet (5 mg) by mouth once daily. Do not crush, chew, or split. (Patient not taking: Reported on 9/20/2024) 30 tablet 11    oxyCODONE (Roxicodone) 5 mg/5 mL solution Take 5 mL (5 mg) by mouth every 6 hours if needed for severe pain (7 - 10) or moderate pain (4 - 6).       No current facility-administered medications for this visit.               REVIEW OF SYSTEMS:  CONSTITUTIONAL: Patient denies fevers, chills, sweats and weight changes.  EYES: Patient denies any visual symptoms.  EARS, NOSE, AND THROAT: No difficulties with hearing. No symptoms of rhinitis or sore throat.  CARDIOVASCULAR: Patient denies chest pains, palpitations, orthopnea and paroxysmal nocturnal dyspnea.  RESPIRATORY: No dyspnea on exertion, no wheezing or cough.  GI: No nausea, vomiting, diarrhea, constipation, abdominal pain, hematochezia or melena.  : No urinary hesitancy or dribbling. No nocturia or urinary frequency. No abnormal urethral discharge.  MUSCULOSKELETAL: No myalgias or arthralgias.  NEUROLOGIC: No chronic headaches, no seizures. Patient denies numbness, tingling or weakness.  PSYCHIATRIC: Patient denies problems with mood disturbance. No problems with anxiety.  ENDOCRINE: No excessive urination or excessive thirst.  DERMATOLOGIC: Patient denies any rashes or skin changes.    PHYSICAL EXAM:  /77   Ht 1.651 m (5' 5\")   Wt 98 kg (216 lb)   BMI 35.94 kg/m²   Awake and alert in no distress  No respiratory distress, breathing and speaking without any difficulty  Abdomen soft nondistended " nontender  No peripheral edema    A/P: Normal post-OP course, not meeting protein goals because of not liking the protein shake  Doing well    Complain of Sunshine colored urine, I do not know what it means however she does not have any urinary tract infection symptoms but because of her concerns we are ordering the urinalysis.  Advance diet as instructed  Advance activity as tolerated, okay to start exercise at 4 weeks  Continue vitamin supplementation  Follow-up with dietitian specially regarding protein choices  Continue multivitamin supplements and PPI  Follow-up 6 weeks

## 2024-10-01 LAB
LABORATORY COMMENT REPORT: NORMAL
PATH REPORT.FINAL DX SPEC: NORMAL
PATH REPORT.GROSS SPEC: NORMAL
PATH REPORT.RELEVANT HX SPEC: NORMAL
PATH REPORT.TOTAL CANCER: NORMAL

## 2024-10-08 ENCOUNTER — OFFICE VISIT (OUTPATIENT)
Dept: SURGERY | Facility: HOSPITAL | Age: 49
End: 2024-10-08
Payer: COMMERCIAL

## 2024-10-08 ENCOUNTER — NUTRITION (OUTPATIENT)
Dept: SURGERY | Facility: HOSPITAL | Age: 49
End: 2024-10-08
Payer: COMMERCIAL

## 2024-10-08 ENCOUNTER — LAB (OUTPATIENT)
Dept: LAB | Facility: LAB | Age: 49
End: 2024-10-08
Payer: COMMERCIAL

## 2024-10-08 VITALS
DIASTOLIC BLOOD PRESSURE: 77 MMHG | BODY MASS INDEX: 35.99 KG/M2 | WEIGHT: 216 LBS | SYSTOLIC BLOOD PRESSURE: 118 MMHG | HEIGHT: 65 IN

## 2024-10-08 DIAGNOSIS — Z90.3 S/P GASTRIC SLEEVE PROCEDURE: Primary | ICD-10-CM

## 2024-10-08 DIAGNOSIS — Z98.84 BARIATRIC SURGERY STATUS: ICD-10-CM

## 2024-10-08 DIAGNOSIS — Z90.3 S/P GASTRIC SLEEVE PROCEDURE: ICD-10-CM

## 2024-10-08 LAB
APPEARANCE UR: CLEAR
BILIRUB UR STRIP.AUTO-MCNC: NEGATIVE MG/DL
COLOR UR: YELLOW
GLUCOSE UR STRIP.AUTO-MCNC: NORMAL MG/DL
KETONES UR STRIP.AUTO-MCNC: ABNORMAL MG/DL
LEUKOCYTE ESTERASE UR QL STRIP.AUTO: NEGATIVE
MUCOUS THREADS #/AREA URNS AUTO: NORMAL /LPF
NITRITE UR QL STRIP.AUTO: NEGATIVE
PH UR STRIP.AUTO: 6.5 [PH]
PROT UR STRIP.AUTO-MCNC: ABNORMAL MG/DL
RBC # UR STRIP.AUTO: NEGATIVE /UL
RBC #/AREA URNS AUTO: NORMAL /HPF
SP GR UR STRIP.AUTO: 1.03
SQUAMOUS #/AREA URNS AUTO: NORMAL /HPF
UROBILINOGEN UR STRIP.AUTO-MCNC: NORMAL MG/DL
WBC #/AREA URNS AUTO: NORMAL /HPF

## 2024-10-08 PROCEDURE — 99211 OFF/OP EST MAY X REQ PHY/QHP: CPT | Performed by: SURGERY

## 2024-10-08 PROCEDURE — 81001 URINALYSIS AUTO W/SCOPE: CPT

## 2024-10-08 PROCEDURE — 3008F BODY MASS INDEX DOCD: CPT | Performed by: SURGERY

## 2024-10-08 NOTE — PROGRESS NOTES
Surgery Date:  9/23/24  Surgeon:  Yosef  Procedure:  sleeve gastrectomy    ASSESSMENT:  Current weight:  216 lbs Ht: 65 in.        BMI: 35.9  Previous weight:   234 lbs PREOP  Initial start weight:  228 lbs  EBW: 84 lbs  Total weight change: -12 lbs  %EBW Lost: 14.2%%    PROGRESS:    Nutrition Interventions for last encounter (date):   1. Drink 64 oz of fluid daily  2. Drink enough protein shakes to meet your goal of 60-70 g of protein per day  3. Take 2 chewable multivitamins, 7950-9654 mg of calcium citrate,  and 500 mcg of B12 daily  4. Get up and walk frequently throughout the day.     CHANGES IN TREATMENT:   Patient met goals:   Partially   Actions to meet previous goals:     24 hour food recall:   Beverages: smoothie (yogurt, ice, protein powder), water, SF popsicles   Alcohol: none      Vitamins:  x2 Equate chewable MVI, 1500 mg Calcium, monthly injection B12, will start vitamin D, probiotic, vitamin E  Medications: see list  Physical Activity: walking through out the day   Nausea/Vomiting/Diarrhea/Constipation: +constipated     READINESS TO LEARN:  Motivation to learn:           Interested   Understanding of instruction: Good   Anticipated Compliance:   Good    Family Support: Unable to assess-family not present   Patient presents with post-op sleeve gastrectomy. Pt is 2 week postop.   Self reported weight today. Patient has lost 12 pounds since initial assessment accounting for 14% loss excess body weight.    Patient tolerating full liquid diet.  Protein intake is not adequate for post-op individual. Patient has not yet found a protein drink to tolerate. Reminded to look into the NG booklet for more is walking through out the day.   Reviewed the puree stage transition.. Reminded pt. to continue to record all PO intake in food journal and to start following 30-30-30 rule.  Encouraged monthly SG meetings.      Malnutrition Screening  Significant unintentional weight loss? n/a  Eating less than 75% of usual  intake for more than 2 weeks? n/a      Nutrition Diagnosis:   1. Increased protein and nutrition needs related to altered GI function as evidenced by pt. s/p sleeve gastrectomy.  2. Food- and nutrition-related knowledge deficit related to lack of prior exposure to surgical weight loss information as evidenced by diet recall.     Nutrition Interventions:   1. Modify type and amount of food and nutrients within meals and snacks.  2. Comprehensive Nutrition Education  -Nutrition education materials: SG Schedule        Recommendations:    1. Drink your protein shakes to meet your goal of 60-70 g of protein per day. Begin measuring how much protein you can eat on the soft diet so that you know when to start weaning off of your protein shakes.   2. Continue to drink 64 oz. of zero calorie beverages per day  3. Continue no drinking 30 min before, during the meal and for 30 minutes after the meal  4. Continue to walk every hour. Some people can tolerate short intervals of walking. Start with 5-10 minutes and build your way up to 20-30 minutes/day over the next 4 weeks.   5. You may begin puree stage and follow for 2 weeks. On 10/22/24 begin to transition to the soft stage and follow for 2 weeks. Please refer to pages 16-23 in your Nutrition Guidelines Booklet for diet instructions.  6. Remember to eat slowly and chew thoroughly  7. Try one new food at a time to test for any intolerances.   8. Continue to take all of your vitamins and minerals.   9.         Attend our Virtual Support Group Monthly!    Nutrition Monitoring and Evaluation:   1-2 pounds weight loss per week  Criteria: weight check, food recall  Need for Follow-up: 6 weeks post op       Sandy Toribio RD, LD  Clinical Dietitian - Bariatric Surgery  Department of General Surgery  Phone: [phone]

## 2024-10-10 ENCOUNTER — TELEPHONE (OUTPATIENT)
Dept: SURGERY | Facility: HOSPITAL | Age: 49
End: 2024-10-10
Payer: COMMERCIAL

## 2024-10-10 NOTE — TELEPHONE ENCOUNTER
----- Message from Crow Navas sent at 10/9/2024 12:07 PM EDT -----  UA shows ketones in urine from protein breakdown with weight loss. She needs to increase her protein intake, otherwise no infection noted.   Thank you      Telephone call placed to patient to review the above results. She verbalized understanding.

## 2024-10-17 ENCOUNTER — APPOINTMENT (OUTPATIENT)
Dept: PRIMARY CARE | Facility: CLINIC | Age: 49
End: 2024-10-17
Payer: COMMERCIAL

## 2024-10-17 ENCOUNTER — TELEPHONE (OUTPATIENT)
Dept: PRIMARY CARE | Facility: CLINIC | Age: 49
End: 2024-10-17

## 2024-10-17 VITALS
HEART RATE: 85 BPM | BODY MASS INDEX: 35.22 KG/M2 | HEIGHT: 65 IN | SYSTOLIC BLOOD PRESSURE: 127 MMHG | DIASTOLIC BLOOD PRESSURE: 83 MMHG | WEIGHT: 211.4 LBS

## 2024-10-17 DIAGNOSIS — E55.9 VITAMIN D DEFICIENCY: ICD-10-CM

## 2024-10-17 DIAGNOSIS — I48.0 PAROXYSMAL ATRIAL FIBRILLATION (MULTI): ICD-10-CM

## 2024-10-17 DIAGNOSIS — Z98.84 HISTORY OF BARIATRIC SURGERY: ICD-10-CM

## 2024-10-17 DIAGNOSIS — E66.812 CLASS 2 OBESITY WITH ALVEOLAR HYPOVENTILATION, SERIOUS COMORBIDITY, AND BODY MASS INDEX (BMI) OF 38.0 TO 38.9 IN ADULT: Primary | ICD-10-CM

## 2024-10-17 DIAGNOSIS — E66.2 CLASS 2 OBESITY WITH ALVEOLAR HYPOVENTILATION, SERIOUS COMORBIDITY, AND BODY MASS INDEX (BMI) OF 38.0 TO 38.9 IN ADULT: Primary | ICD-10-CM

## 2024-10-17 PROCEDURE — 1036F TOBACCO NON-USER: CPT | Performed by: FAMILY MEDICINE

## 2024-10-17 PROCEDURE — 99214 OFFICE O/P EST MOD 30 MIN: CPT | Performed by: FAMILY MEDICINE

## 2024-10-17 PROCEDURE — 3008F BODY MASS INDEX DOCD: CPT | Performed by: FAMILY MEDICINE

## 2024-10-17 RX ORDER — CALCIUM CITRATE/VITAMIN D3 500MG-12.5
500 TABLET,CHEWABLE ORAL 3 TIMES DAILY
Qty: 90 TABLET | Refills: 11 | Status: SHIPPED | OUTPATIENT
Start: 2024-10-17 | End: 2024-10-17

## 2024-10-17 RX ORDER — CALCIUM CARBONATE/VITAMIN D3 500 MG-10
1 TABLET,CHEWABLE ORAL 3 TIMES DAILY
Qty: 90 TABLET | Refills: 1 | Status: SHIPPED | OUTPATIENT
Start: 2024-10-17

## 2024-10-17 ASSESSMENT — PATIENT HEALTH QUESTIONNAIRE - PHQ9
2. FEELING DOWN, DEPRESSED OR HOPELESS: NOT AT ALL
SUM OF ALL RESPONSES TO PHQ9 QUESTIONS 1 AND 2: 0
1. LITTLE INTEREST OR PLEASURE IN DOING THINGS: NOT AT ALL

## 2024-10-17 ASSESSMENT — ENCOUNTER SYMPTOMS
DEPRESSION: 0
LOSS OF SENSATION IN FEET: 0
OCCASIONAL FEELINGS OF UNSTEADINESS: 0

## 2024-10-17 NOTE — TELEPHONE ENCOUNTER
Pt needs calcium citrate vitamin D3 500 mg sent to pharmacy , she said calcium carbonate - vitamin D3 500 mg is the wrong one that was sent over

## 2024-10-17 NOTE — PROGRESS NOTES
Subjective   Patient ID: Ayanna Willard is a 49 y.o. female who presents for Follow-up.      HPI  Current Outpatient Medications on File Prior to Visit   Medication Sig Dispense Refill    cyanocobalamin (Vitamin B-12) 1,000 mcg/mL injection INJECT 1 ML (1,000 MCG) INTO THE MUSCLE EVERY 30 (THIRTY) DAYS. 9 mL 1    ergocalciferol (Vitamin D-2) 1.25 MG (45810 UT) capsule TAKE 1 CAPSULE (37153 UNITS) BY MOUTH ONE TIME PER WEEK 13 capsule 0    metoprolol succinate XL (Toprol-XL) 25 mg 24 hr tablet Take 1 tablet (25 mg) by mouth once daily. 90 tablet 3    omeprazole (PriLOSEC) 40 mg DR capsule Take 1 capsule (40 mg) by mouth once daily in the morning. Take before meals. Do not crush or chew. Open capsule, sprinkle beads on SF jello, pudding or applesauce. 30 capsule 5    [DISCONTINUED] estradioL 10 mcg insert, dose pack Insert vaginally daily for 14 days followed by 2 times/week (Patient not taking: Reported on 9/20/2024) 18 each 0    [DISCONTINUED] ondansetron ODT (Zofran-ODT) 4 mg disintegrating tablet Take 1 tablet (4 mg) by mouth every 6 hours if needed for nausea or vomiting. 60 tablet 1    [DISCONTINUED] oxybutynin XL (Ditropan-XL) 5 mg 24 hr tablet Take 1 tablet (5 mg) by mouth once daily. Do not crush, chew, or split. (Patient not taking: Reported on 9/20/2024) 30 tablet 11    [DISCONTINUED] oxyCODONE (Roxicodone) 5 mg/5 mL solution Take 5 mL (5 mg) by mouth every 6 hours if needed for severe pain (7 - 10) or moderate pain (4 - 6).       No current facility-administered medications on file prior to visit.        Review of Systems   Constitutional:  Negative for chills and fever.   HENT: Negative.     Respiratory: Negative.     Cardiovascular: Negative.    Gastrointestinal: Negative.  Negative for nausea and vomiting.   Endocrine: Negative.    Genitourinary: Negative.    Musculoskeletal: Negative.    Skin: Negative.  Negative for rash.   Allergic/Immunologic: Negative.    Neurological: Negative.    Hematological:  "Negative.    Psychiatric/Behavioral: Negative.     All other systems reviewed and are negative.      Objective   /83   Pulse 85   Ht 1.651 m (5' 5\")   Wt 95.9 kg (211 lb 6.4 oz)   BMI 35.18 kg/m²   BSA: 2.1 meters squared  Growth percentiles: Facility age limit for growth %dima is 20 years. Facility age limit for growth %dima is 20 years.   No visits with results within 1 Week(s) from this visit.   Latest known visit with results is:   Lab on 10/08/2024   Component Date Value Ref Range Status    Color, Urine 10/08/2024 Yellow  Light-Yellow, Yellow, Dark-Yellow Final    Appearance, Urine 10/08/2024 Clear  Clear Final    Specific Gravity, Urine 10/08/2024 1.026  1.005 - 1.035 Final    pH, Urine 10/08/2024 6.5  5.0, 5.5, 6.0, 6.5, 7.0, 7.5, 8.0 Final    Protein, Urine 10/08/2024 20 (TRACE)  NEGATIVE, 10 (TRACE), 20 (TRACE) mg/dL Final    Glucose, Urine 10/08/2024 Normal  Normal mg/dL Final    Blood, Urine 10/08/2024 NEGATIVE  NEGATIVE Final    Ketones, Urine 10/08/2024 100 (3+) (A)  NEGATIVE mg/dL Final    Bilirubin, Urine 10/08/2024 NEGATIVE  NEGATIVE Final    Urobilinogen, Urine 10/08/2024 Normal  Normal mg/dL Final    Nitrite, Urine 10/08/2024 NEGATIVE  NEGATIVE Final    Leukocyte Esterase, Urine 10/08/2024 NEGATIVE  NEGATIVE Final    WBC, Urine 10/08/2024 1-5  1-5, NONE /HPF Final    RBC, Urine 10/08/2024 1-2  NONE, 1-2, 3-5 /HPF Final    Squamous Epithelial Cells, Urine 10/08/2024 1-9 (SPARSE)  Reference range not established. /HPF Final    Mucus, Urine 10/08/2024 4+  Reference range not established. /LPF Final      Physical Exam  Constitutional:       Appearance: Normal appearance.   HENT:      Right Ear: Tympanic membrane normal.      Left Ear: Tympanic membrane normal.   Cardiovascular:      Rate and Rhythm: Normal rate and regular rhythm.   Pulmonary:      Effort: Pulmonary effort is normal.      Breath sounds: Normal breath sounds.   Abdominal:      General: Bowel sounds are normal.   Neurological: "      General: No focal deficit present.      Mental Status: She is alert.   Psychiatric:         Mood and Affect: Mood normal.         Assessment/Plan   Problem List Items Addressed This Visit             ICD-10-CM    Afib (Multi) I48.91     C/w metoprolol         Vitamin D deficiency E55.9     Will start calcium vit d citrate         History of bariatric surgery Z98.84    Class 2 obesity with alveolar hypoventilation, serious comorbidity, and body mass index (BMI) of 38.0 to 38.9 in adult - Primary E66.812, E66.2, Z68.38     Patient has tried various medically supervised weight loss programs patient would be a good candidate for bariatric surgery and would benefit from upcoming bariatric surgery due to history of fatty liver disease and atrial fibrillation 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

## 2024-10-18 NOTE — TELEPHONE ENCOUNTER
Pt called she said Pharmacy stated they never got calcium citrate vitamin D3 500 , they said it needs to get sent over

## 2024-10-19 PROBLEM — R10.32 ABDOMINAL PAIN, LLQ (LEFT LOWER QUADRANT): Status: RESOLVED | Noted: 2023-03-23 | Resolved: 2024-10-19

## 2024-10-19 PROBLEM — S92.344A CLOSED NONDISPLACED FRACTURE OF FOURTH METATARSAL BONE OF RIGHT FOOT: Status: RESOLVED | Noted: 2023-03-23 | Resolved: 2024-10-19

## 2024-10-19 PROBLEM — E74.39 OTHER DISORDERS OF INTESTINAL CARBOHYDRATE ABSORPTION: Status: RESOLVED | Noted: 2023-03-23 | Resolved: 2024-10-19

## 2024-10-19 PROBLEM — S92.334A CLOSED NONDISPLACED FRACTURE OF THIRD METATARSAL BONE OF RIGHT FOOT: Status: RESOLVED | Noted: 2023-03-23 | Resolved: 2024-10-19

## 2024-10-19 PROBLEM — S92.324A CLOSED NONDISPLACED FRACTURE OF SECOND METATARSAL BONE OF RIGHT FOOT: Status: RESOLVED | Noted: 2023-03-23 | Resolved: 2024-10-19

## 2024-10-19 PROBLEM — K21.9 GASTROESOPHAGEAL REFLUX DISEASE: Status: RESOLVED | Noted: 2023-03-23 | Resolved: 2024-10-19

## 2024-10-19 ASSESSMENT — ENCOUNTER SYMPTOMS
FEVER: 0
RESPIRATORY NEGATIVE: 1
PSYCHIATRIC NEGATIVE: 1
VOMITING: 0
GASTROINTESTINAL NEGATIVE: 1
NAUSEA: 0
HEMATOLOGIC/LYMPHATIC NEGATIVE: 1
NEUROLOGICAL NEGATIVE: 1
CARDIOVASCULAR NEGATIVE: 1
MUSCULOSKELETAL NEGATIVE: 1
ALLERGIC/IMMUNOLOGIC NEGATIVE: 1
ENDOCRINE NEGATIVE: 1
CHILLS: 0

## 2024-10-30 NOTE — PROGRESS NOTES
BARIATRIC SURGERY CLINIC  6 WEEKS FOLLOW UP NOTE    Clinic Date: 11/5/24    Ayanna Willard, 49 y.o.  MRN: [id number]    Date of Surgery: 9/23/24   Surgical Procedure: Laparoscopic sleeve gastrectomy  82840    Extra Procedures: None    COMPLICATIONS DURING ADMISSION: None    Initial weight: 228 lb  Pre-surgical weight: 234 lb  Today's Visit:   Wt Readings from Last 1 Encounters:   10/17/24 95.9 kg (211 lb 6.4 oz)    There is no height or weight on file to calculate BMI.   Total weight loss: 30 lbs      PRESENTING TODAY FOR BARIATRIC POST-OP VISIT:  Visit:  6 weeks   -  Self Reports Concerns:  feels like food get stuck at top of stomach when eating, takes a while to go down.  Symptoms:    Abdominal pain:  No            Constipation: No    Diarrhea: No    Dumping Syndrome:  No    Experiencing hunger: No    Food Intolerances: No    Nausea/vomiting: No    Reflux: No   Are you on medications: Yes      Diet History:     Carbonated Beverages: No          Caffeinated Beverages: No    Time to eat meals: 20 min    Fluid intake: 25-30 oz/day      Protein Intake:  15 grams/day    Breakfast: tuna packet    Lunch: same as breakfast    Dinner: same as breakfast    Snacks: cucumber or celery    EXERCISE: Yes- walking and weights    Last Visit:    Wt Readings from Last 3 Encounters:   10/17/24 95.9 kg (211 lb 6.4 oz)   10/08/24 98 kg (216 lb)   09/24/24 105 kg (230 lb 13.2 oz)        HPI: pt presenting for 6 week follow up visit for gastric sleeve.     Current Outpatient Medications   Medication Sig Dispense Refill    calcium carbonate-vitamin D3 500 mg-10 mcg (400 unit) chewable tablet TAKE 1 TABLET BY MOUTH THREE TIMES A DAY 90 tablet 1    cyanocobalamin (Vitamin B-12) 1,000 mcg/mL injection INJECT 1 ML (1,000 MCG) INTO THE MUSCLE EVERY 30 (THIRTY) DAYS. 9 mL 1    ergocalciferol (Vitamin D-2) 1.25 MG (95317 UT) capsule TAKE 1 CAPSULE (08917 UNITS) BY MOUTH ONE TIME PER WEEK 13 capsule 0    metoprolol succinate XL (Toprol-XL) 25 mg 24  "hr tablet Take 1 tablet (25 mg) by mouth once daily. 90 tablet 3    omeprazole (PriLOSEC) 40 mg DR capsule Take 1 capsule (40 mg) by mouth once daily in the morning. Take before meals. Do not crush or chew. Open capsule, sprinkle beads on SF jello, pudding or applesauce. 30 capsule 5     No current facility-administered medications for this visit.       Comorbidities:      DAILY SUPPLEMENTS:  Calcium: Calcium Citrate w/ vitamin D (1200 - 1500mg)  Multivitamin & Minerals: 2 per day  Iron Supplement: included in multi-vitamin  Vitamin B12: 1000 mcg   Vitamin D3: 3000 units    REVIEW OF SYSTEMS:  CONSTITUTIONAL: Patient denies fevers, chills, sweats and weight changes.  EYES: Patient denies any visual symptoms.  EARS, NOSE, AND THROAT: No difficulties with hearing. No symptoms of rhinitis or sore throat.  CARDIOVASCULAR: Patient denies chest pains, palpitations, orthopnea and paroxysmal nocturnal dyspnea.  RESPIRATORY: No dyspnea on exertion, no wheezing or cough.  GI: No nausea, vomiting, diarrhea, constipation, abdominal pain, hematochezia or melena.  : No urinary hesitancy or dribbling. No nocturia or urinary frequency. No abnormal urethral discharge.  MUSCULOSKELETAL: No myalgias or arthralgias.  NEUROLOGIC: No chronic headaches, no seizures. Patient denies numbness, tingling or weakness.  PSYCHIATRIC: Patient denies problems with mood disturbance. No problems with anxiety.  ENDOCRINE: No excessive urination or excessive thirst.  DERMATOLOGIC: Patient denies any rashes or skin changes.    PHYSICAL EXAM:  /72   Pulse 100   Temp 36.6 °C (97.9 °F)   Ht 1.651 m (5' 5\")   Wt 91.2 kg (201 lb)   SpO2 98%   BMI 33.45 kg/m²  Body mass index is 33.45 kg/m².  GENERAL: Obese. No apparent distress. Pt is alert and oriented x3.  ABDOMEN: Soft, nontender, and nondistended.   EXTREMITIES: Without any cyanosis, clubbing, rash, lesions or edema.  NEUROLOGIC: Cranial nerves II through XII are grossly " intact.  PSYCHIATRIC: Flat affect, but denies suicidal or homicidal ideations.  SKIN: No ulceration or induration present.      TODAY'S PROVIDER VISIT IMPRESSIONS:    Patient is doing well   six weeks s/p SG    no acute issues   takes supplements   does regular exercise  no pain or GERD   on PPI    Recs:     doing well  No acute issues   advised to continue Diet,   increase exercise,  FU with dietitian   continue vitamin supplementation, PPI   support groups  FU 6 weeks          A/P: Normal post-OP course    Congratulations, you are 6 weeks post-op: Surgical Procedure: Laparoscopic sleeve gastrectomy  79968 into your journey!    The rules of 60--Continue to aim for 60 grams of protein and 64 oz of water a day.    Be sure to take you multivitamins, B12 and calcium daily.    Continue to exercise, increase intensity and variety of your exercise routine, and aim for 300 minutes a week total.    Come to support groups.    Follow up with the Dietician as needed..    Follow up in 6 weeks for your 3 month post-op follow up visit.      Sherry Oconnell RN Assistant Nurse Manager, Care Coordinator Patient Nursing Contact  T: [phone] F: [phone]

## 2024-11-05 ENCOUNTER — NUTRITION (OUTPATIENT)
Dept: SURGERY | Facility: HOSPITAL | Age: 49
End: 2024-11-05
Payer: COMMERCIAL

## 2024-11-05 ENCOUNTER — OFFICE VISIT (OUTPATIENT)
Dept: SURGERY | Facility: HOSPITAL | Age: 49
End: 2024-11-05
Payer: COMMERCIAL

## 2024-11-05 VITALS
HEART RATE: 100 BPM | DIASTOLIC BLOOD PRESSURE: 72 MMHG | SYSTOLIC BLOOD PRESSURE: 103 MMHG | TEMPERATURE: 97.9 F | OXYGEN SATURATION: 98 % | WEIGHT: 201 LBS | HEIGHT: 65 IN | BODY MASS INDEX: 33.49 KG/M2

## 2024-11-05 DIAGNOSIS — Z90.3 S/P GASTRIC SLEEVE PROCEDURE: Primary | ICD-10-CM

## 2024-11-05 DIAGNOSIS — Z98.84 S/P LAPAROSCOPIC SLEEVE GASTRECTOMY: Primary | ICD-10-CM

## 2024-11-05 PROCEDURE — 3008F BODY MASS INDEX DOCD: CPT | Performed by: SURGERY

## 2024-11-05 PROCEDURE — 99211 OFF/OP EST MAY X REQ PHY/QHP: CPT | Performed by: SURGERY

## 2024-11-05 NOTE — PROGRESS NOTES
Surgery Date:  9/23/24  Surgeon:  Yosef  Procedure:  sleeve gastrectomy      ASSESSMENT:  Current weight:     201 lbs             Ht:    65  in.        BMI:  33.4  Previous weight:   216 lbs  Initial start weight:   228 lbs;  234 lbs PREOP   EBW:  84 lbs  Total weight change:  -33lb  %EBW Lost: 39%    PROGRESS:    Nutrition Interventions for last encounter (10/8/24):   1.          Drink your protein shakes to meet your goal of 60-70 g of protein per day. Begin measuring how much protein you can eat on the soft diet so that you know when to start weaning off of your protein shakes.   2. Continue to drink 64 oz. of zero calorie beverages per day  3. Continue no drinking 30 min before, during the meal and for 30 minutes after the meal  4.          Continue to walk every hour. Some people can tolerate short intervals of walking. Start with 5-10 minutes and build your way up to 20-30 minutes/day over the next 4 weeks.   5.          You may begin puree stage and follow for 2 weeks. On 10/22/24 begin to transition to the soft stage and follow for 2 weeks. Please refer to pages 16-23 in your Nutrition Guidelines Booklet for diet instructions.  6. Remember to eat slowly and chew thoroughly  7.          Try one new food at a time to test for any intolerances.   8.          Continue to take all of your vitamins and minerals.   9.         Attend our Virtual Support Group Monthly!    CHANGES IN TREATMENT:   Patient met goals: Yes      Actions to meet previous goals:     24 hour food recall:   Breakfast:  2 HB eggs (12g)  Snack:   Lunch: 1/2 can tuna  Snack:     Dinner: 1/3 canned chicken   Snack:   Beverages:  [id number], water   Alcohol: none      Vitamins:    x2 Equate chewable MVI, 1500 mg Calcium, monthly injection B12, will start vitamin D, probiotic, vitamin E   Medications: see list  Physical Activity: walking dogs, 3# weights   Nausea/Vomiting/Diarrhea/Constipation: NKFA    READINESS TO LEARN:  Motivation to learn:            Interested      Understanding of instruction: Good      Anticipated Compliance: Good     Family Support: Unable to assess-family not present     Patient presents with post-op  sleeve gastrectomy. Pt is 6 weeks postop.    Weight today is self reported. Patient has lost 33 pounds since initial assessment accounting for 29% loss excess body weight.    Tolerating Soft diet without difficulty.  Protein intake is adequate for post-op individual. Fluid consumption is adequate. Patient is supplementing recommended vitamin/minerals. Pt states concerns/difficulties with epigastric pain when eating dense proteins s/a chicken and tuna. Encouraged to add moisture by mixing with rm.   Discussed the transition diet. Reminded pt to eat slowly, chew thoroughly and to try on new food at a time. Reminded to follow 30-30-30 rule. Encouraged monthly SG meetings.          Malnutrition Screening  Significant unintentional weight loss? n/a  Eating less than 75% of usual intake for more than 2 weeks? n/a    Nutrition Diagnosis:   1. Increased protein and nutrition needs related to altered GI function as evidenced by pt. s/p sleeve gastrectomy.  2. Food- and nutrition-related knowledge deficit related to lack of prior exposure to surgical weight loss information as evidenced by diet recall.     Nutrition Interventions:   1. Modify type and amount of food and nutrients within meals and snacks.  2. Comprehensive Nutrition Education  -Nutrition education materials: SG Schedule        Recommendations:    1. Great Job! You have lost over 30 pounds since joining the program!  2. Continue to eat protein rich foods first at meals to meet your goal of  60-70 g of protein per day  3.  You may begin trying raw fruits, raw vegetables and nuts. Continue to avoid breads, pastas, rice, and tough meats such as beef and pork.  4.  Try one new food at a time and remember to chew, chew, chew!  5. Drink 64 oz. of zero calorie beverages per day  6. Continue  no drinking 30 min before, during the meal and for 30 minutes after the meal  7.  Begin structured exercise and build intensity and duration as tolerated.   8.  Continue current vit/min regimen  9. Attend our Virtual Support Group monthly!    Nutrition Monitoring and Evaluation:   1-2 pounds weight loss per week  Criteria: weight check, food recall  Need for Follow-up: 3 months

## 2024-12-02 ENCOUNTER — OFFICE VISIT (OUTPATIENT)
Dept: PRIMARY CARE | Facility: CLINIC | Age: 49
End: 2024-12-02
Payer: COMMERCIAL

## 2024-12-02 VITALS
SYSTOLIC BLOOD PRESSURE: 113 MMHG | HEART RATE: 89 BPM | HEIGHT: 65 IN | WEIGHT: 193.8 LBS | DIASTOLIC BLOOD PRESSURE: 81 MMHG | BODY MASS INDEX: 32.29 KG/M2

## 2024-12-02 DIAGNOSIS — J01.00 ACUTE NON-RECURRENT MAXILLARY SINUSITIS: Primary | ICD-10-CM

## 2024-12-02 DIAGNOSIS — R00.0 TACHYCARDIA: ICD-10-CM

## 2024-12-02 DIAGNOSIS — Z98.84 HISTORY OF BARIATRIC SURGERY: ICD-10-CM

## 2024-12-02 PROCEDURE — 3008F BODY MASS INDEX DOCD: CPT | Performed by: FAMILY MEDICINE

## 2024-12-02 PROCEDURE — 99214 OFFICE O/P EST MOD 30 MIN: CPT | Performed by: FAMILY MEDICINE

## 2024-12-02 RX ORDER — CEFUROXIME AXETIL 500 MG/1
500 TABLET ORAL 2 TIMES DAILY
Qty: 14 TABLET | Refills: 0 | Status: SHIPPED | OUTPATIENT
Start: 2024-12-02 | End: 2024-12-09

## 2024-12-02 ASSESSMENT — ENCOUNTER SYMPTOMS
OCCASIONAL FEELINGS OF UNSTEADINESS: 0
LOSS OF SENSATION IN FEET: 0
DEPRESSION: 0

## 2024-12-08 ASSESSMENT — ENCOUNTER SYMPTOMS
RHINORRHEA: 1
ABDOMINAL PAIN: 1
SINUS PAIN: 0
CHILLS: 0
EYE REDNESS: 0
COUGH: 1
EYE DISCHARGE: 0
ALLERGIC/IMMUNOLOGIC NEGATIVE: 1
SORE THROAT: 1
SINUS PRESSURE: 1
ACTIVITY CHANGE: 0
NEUROLOGICAL NEGATIVE: 1
MUSCULOSKELETAL NEGATIVE: 1
FATIGUE: 1
FEVER: 0
WHEEZING: 0
SHORTNESS OF BREATH: 0

## 2024-12-08 NOTE — PROGRESS NOTES
Subjective   Patient ID: Ayanna Willard is a 49 y.o. female who presents for Sick Visit, Sore Throat, and Cough.      Sore Throat   Associated symptoms include abdominal pain, congestion and coughing. Pertinent negatives include no ear discharge or shortness of breath.   Cough  Associated symptoms include rhinorrhea and a sore throat. Pertinent negatives include no chills, eye redness, fever, shortness of breath or wheezing.    cough congestion postnasal drainage has had facial pain underwent bariatric surgery has been doing well had gastric sleeve done has lost weight  Denies shortness of breath  Current Outpatient Medications on File Prior to Visit   Medication Sig Dispense Refill    calcium carbonate-vitamin D3 500 mg-10 mcg (400 unit) chewable tablet TAKE 1 TABLET BY MOUTH THREE TIMES A DAY 90 tablet 1    cyanocobalamin (Vitamin B-12) 1,000 mcg/mL injection INJECT 1 ML (1,000 MCG) INTO THE MUSCLE EVERY 30 (THIRTY) DAYS. 9 mL 1    ergocalciferol (Vitamin D-2) 1.25 MG (68608 UT) capsule TAKE 1 CAPSULE (48675 UNITS) BY MOUTH ONE TIME PER WEEK 13 capsule 0    metoprolol succinate XL (Toprol-XL) 25 mg 24 hr tablet Take 1 tablet (25 mg) by mouth once daily. 90 tablet 3    omeprazole (PriLOSEC) 40 mg DR capsule Take 1 capsule (40 mg) by mouth once daily in the morning. Take before meals. Do not crush or chew. Open capsule, sprinkle beads on SF jello, pudding or applesauce. 30 capsule 5     No current facility-administered medications on file prior to visit.        Review of Systems   Constitutional:  Positive for fatigue. Negative for activity change, chills and fever.   HENT:  Positive for congestion, rhinorrhea, sinus pressure and sore throat. Negative for ear discharge and sinus pain.    Eyes:  Negative for discharge and redness.   Respiratory:  Positive for cough. Negative for shortness of breath and wheezing.    Gastrointestinal:  Positive for abdominal pain.   Genitourinary: Negative.    Musculoskeletal:  "Negative.    Skin: Negative.    Allergic/Immunologic: Negative.    Neurological: Negative.        Objective   /81   Pulse 89   Ht 1.651 m (5' 5\")   Wt 87.9 kg (193 lb 12.8 oz)   BMI 32.25 kg/m²   BSA: 2.01 meters squared  Growth percentiles: Facility age limit for growth %dima is 20 years. Facility age limit for growth %dima is 20 years.   No visits with results within 1 Week(s) from this visit.   Latest known visit with results is:   Lab on 10/08/2024   Component Date Value Ref Range Status    Color, Urine 10/08/2024 Yellow  Light-Yellow, Yellow, Dark-Yellow Final    Appearance, Urine 10/08/2024 Clear  Clear Final    Specific Gravity, Urine 10/08/2024 1.026  1.005 - 1.035 Final    pH, Urine 10/08/2024 6.5  5.0, 5.5, 6.0, 6.5, 7.0, 7.5, 8.0 Final    Protein, Urine 10/08/2024 20 (TRACE)  NEGATIVE, 10 (TRACE), 20 (TRACE) mg/dL Final    Glucose, Urine 10/08/2024 Normal  Normal mg/dL Final    Blood, Urine 10/08/2024 NEGATIVE  NEGATIVE Final    Ketones, Urine 10/08/2024 100 (3+) (A)  NEGATIVE mg/dL Final    Bilirubin, Urine 10/08/2024 NEGATIVE  NEGATIVE Final    Urobilinogen, Urine 10/08/2024 Normal  Normal mg/dL Final    Nitrite, Urine 10/08/2024 NEGATIVE  NEGATIVE Final    Leukocyte Esterase, Urine 10/08/2024 NEGATIVE  NEGATIVE Final    WBC, Urine 10/08/2024 1-5  1-5, NONE /HPF Final    RBC, Urine 10/08/2024 1-2  NONE, 1-2, 3-5 /HPF Final    Squamous Epithelial Cells, Urine 10/08/2024 1-9 (SPARSE)  Reference range not established. /HPF Final    Mucus, Urine 10/08/2024 4+  Reference range not established. /LPF Final      Physical Exam  Constitutional:       General: She is not in acute distress.  HENT:      Nose: Congestion and rhinorrhea present.      Mouth/Throat:      Pharynx: Oropharyngeal exudate and posterior oropharyngeal erythema present.   Eyes:      Extraocular Movements: Extraocular movements intact.   Cardiovascular:      Rate and Rhythm: Normal rate and regular rhythm.   Pulmonary:      Breath " sounds: Normal breath sounds.   Abdominal:      General: Bowel sounds are normal.   Musculoskeletal:         General: Normal range of motion.      Cervical back: No rigidity.   Skin:     Findings: No rash.   Neurological:      General: No focal deficit present.      Mental Status: She is alert.   Psychiatric:         Thought Content: Thought content normal.         Assessment/Plan   Problem List Items Addressed This Visit       Sinusitis - Primary     Acute sinusitis c/o headache fever facial pain cough ear pain cough green phlem. No ST .            Relevant Medications    cefuroxime (Ceftin) 500 mg tablet    Tachycardia     C/w metoprolol         History of bariatric surgery     Recovering well continue healthy eating and increase exercise

## 2024-12-24 ENCOUNTER — OFFICE VISIT (OUTPATIENT)
Dept: SURGERY | Facility: HOSPITAL | Age: 49
End: 2024-12-24
Payer: COMMERCIAL

## 2024-12-24 ENCOUNTER — LAB (OUTPATIENT)
Dept: LAB | Facility: LAB | Age: 49
End: 2024-12-24
Payer: COMMERCIAL

## 2024-12-24 ENCOUNTER — NUTRITION (OUTPATIENT)
Dept: SURGERY | Facility: CLINIC | Age: 49
End: 2024-12-24
Payer: COMMERCIAL

## 2024-12-24 VITALS
OXYGEN SATURATION: 96 % | WEIGHT: 188.1 LBS | SYSTOLIC BLOOD PRESSURE: 97 MMHG | TEMPERATURE: 97.2 F | DIASTOLIC BLOOD PRESSURE: 71 MMHG | BODY MASS INDEX: 31.3 KG/M2 | HEART RATE: 99 BPM

## 2024-12-24 VITALS — WEIGHT: 185.4 LBS | BODY MASS INDEX: 30.89 KG/M2 | HEIGHT: 65 IN

## 2024-12-24 DIAGNOSIS — Z90.3 INTESTINAL MALABSORPTION FOLLOWING GASTRECTOMY (HHS-HCC): ICD-10-CM

## 2024-12-24 DIAGNOSIS — K91.2 INTESTINAL MALABSORPTION FOLLOWING GASTRECTOMY (HHS-HCC): Primary | ICD-10-CM

## 2024-12-24 DIAGNOSIS — Z98.84 HISTORY OF BARIATRIC SURGERY: ICD-10-CM

## 2024-12-24 DIAGNOSIS — Z90.3 INTESTINAL MALABSORPTION FOLLOWING GASTRECTOMY (HHS-HCC): Primary | ICD-10-CM

## 2024-12-24 DIAGNOSIS — K91.2 INTESTINAL MALABSORPTION FOLLOWING GASTRECTOMY (HHS-HCC): ICD-10-CM

## 2024-12-24 LAB
25(OH)D3 SERPL-MCNC: 74 NG/ML (ref 30–100)
ALBUMIN SERPL BCP-MCNC: 4.6 G/DL (ref 3.4–5)
ALP SERPL-CCNC: 61 U/L (ref 33–110)
ALT SERPL W P-5'-P-CCNC: 40 U/L (ref 7–45)
ANION GAP SERPL CALC-SCNC: 16 MMOL/L (ref 10–20)
AST SERPL W P-5'-P-CCNC: 25 U/L (ref 9–39)
BILIRUB SERPL-MCNC: 0.7 MG/DL (ref 0–1.2)
BUN SERPL-MCNC: 12 MG/DL (ref 6–23)
CALCIUM SERPL-MCNC: 10 MG/DL (ref 8.6–10.6)
CHLORIDE SERPL-SCNC: 103 MMOL/L (ref 98–107)
CO2 SERPL-SCNC: 26 MMOL/L (ref 21–32)
CREAT SERPL-MCNC: 0.78 MG/DL (ref 0.5–1.05)
EGFRCR SERPLBLD CKD-EPI 2021: >90 ML/MIN/1.73M*2
ERYTHROCYTE [DISTWIDTH] IN BLOOD BY AUTOMATED COUNT: 14.7 % (ref 11.5–14.5)
FERRITIN SERPL-MCNC: 653 NG/ML (ref 8–150)
FOLATE SERPL-MCNC: >24 NG/ML
GLUCOSE SERPL-MCNC: 100 MG/DL (ref 74–99)
HCT VFR BLD AUTO: 41.3 % (ref 36–46)
HGB BLD-MCNC: 13.5 G/DL (ref 12–16)
IRON SATN MFR SERPL: 34 % (ref 25–45)
IRON SERPL-MCNC: 109 UG/DL (ref 35–150)
MCH RBC QN AUTO: 27.4 PG (ref 26–34)
MCHC RBC AUTO-ENTMCNC: 32.7 G/DL (ref 32–36)
MCV RBC AUTO: 84 FL (ref 80–100)
NRBC BLD-RTO: 0 /100 WBCS (ref 0–0)
PLATELET # BLD AUTO: 276 X10*3/UL (ref 150–450)
POTASSIUM SERPL-SCNC: 3.9 MMOL/L (ref 3.5–5.3)
PROT SERPL-MCNC: 6.9 G/DL (ref 6.4–8.2)
PTH-INTACT SERPL-MCNC: 35.1 PG/ML (ref 18.5–88)
RBC # BLD AUTO: 4.92 X10*6/UL (ref 4–5.2)
SODIUM SERPL-SCNC: 141 MMOL/L (ref 136–145)
TIBC SERPL-MCNC: 324 UG/DL (ref 240–445)
UIBC SERPL-MCNC: 215 UG/DL (ref 110–370)
VIT B12 SERPL-MCNC: 1223 PG/ML (ref 211–911)
WBC # BLD AUTO: 4.4 X10*3/UL (ref 4.4–11.3)

## 2024-12-24 PROCEDURE — 84425 ASSAY OF VITAMIN B-1: CPT

## 2024-12-24 PROCEDURE — G2211 COMPLEX E/M VISIT ADD ON: HCPCS

## 2024-12-24 PROCEDURE — 82746 ASSAY OF FOLIC ACID SERUM: CPT

## 2024-12-24 PROCEDURE — 99211 OFF/OP EST MAY X REQ PHY/QHP: CPT

## 2024-12-24 PROCEDURE — 82728 ASSAY OF FERRITIN: CPT

## 2024-12-24 PROCEDURE — 82306 VITAMIN D 25 HYDROXY: CPT

## 2024-12-24 PROCEDURE — 83550 IRON BINDING TEST: CPT

## 2024-12-24 PROCEDURE — 82607 VITAMIN B-12: CPT

## 2024-12-24 PROCEDURE — 85027 COMPLETE CBC AUTOMATED: CPT

## 2024-12-24 PROCEDURE — 83970 ASSAY OF PARATHORMONE: CPT

## 2024-12-24 PROCEDURE — 80053 COMPREHEN METABOLIC PANEL: CPT

## 2024-12-24 PROCEDURE — 36415 COLL VENOUS BLD VENIPUNCTURE: CPT

## 2024-12-24 PROCEDURE — 83540 ASSAY OF IRON: CPT

## 2024-12-24 PROCEDURE — 1036F TOBACCO NON-USER: CPT

## 2024-12-24 RX ORDER — CALCIUM CITRATE/VITAMIN D3 315MG-6.25
1 TABLET ORAL
COMMUNITY
Start: 2024-10-19 | End: 2024-12-24 | Stop reason: WASHOUT

## 2024-12-24 SDOH — ECONOMIC STABILITY: FOOD INSECURITY: WITHIN THE PAST 12 MONTHS, YOU WORRIED THAT YOUR FOOD WOULD RUN OUT BEFORE YOU GOT MONEY TO BUY MORE.: NEVER TRUE

## 2024-12-24 SDOH — ECONOMIC STABILITY: FOOD INSECURITY: WITHIN THE PAST 12 MONTHS, THE FOOD YOU BOUGHT JUST DIDN'T LAST AND YOU DIDN'T HAVE MONEY TO GET MORE.: NEVER TRUE

## 2024-12-24 ASSESSMENT — PATIENT HEALTH QUESTIONNAIRE - PHQ9
1. LITTLE INTEREST OR PLEASURE IN DOING THINGS: NOT AT ALL
2. FEELING DOWN, DEPRESSED OR HOPELESS: NOT AT ALL
SUM OF ALL RESPONSES TO PHQ9 QUESTIONS 1 & 2: 0

## 2024-12-24 ASSESSMENT — LIFESTYLE VARIABLES
HOW OFTEN DO YOU HAVE SIX OR MORE DRINKS ON ONE OCCASION: NEVER
SKIP TO QUESTIONS 9-10: 1
HOW MANY STANDARD DRINKS CONTAINING ALCOHOL DO YOU HAVE ON A TYPICAL DAY: PATIENT DOES NOT DRINK
AUDIT-C TOTAL SCORE: 0
HOW OFTEN DO YOU HAVE A DRINK CONTAINING ALCOHOL: NEVER

## 2024-12-24 ASSESSMENT — PAIN SCALES - GENERAL: PAINLEVEL_OUTOF10: 0-NO PAIN

## 2024-12-24 NOTE — PROGRESS NOTES
BARIATRIC SURGERY CLINIC  3 months FOLLOW UP NOTE      Name: Ayanna Willard  MRN: [id number]    Index Surgery  Date of Surgery: 09/23/2024   Surgeon: Yosef    Surgical Procedure: Laparoscopic sleeve gastrectomy  21905    HPI:   Presenting for 3 months s/p sleeve gastrectomy follow up visit. Feels good.     Initial weight: 234 lbs  Current weight: 188 lbs  Current BMI: 31.3    Diet: Regular. Reports having about 1000 shen/day. She does not think she reaches her protein goal. Meat sits very heavy in her body. Fish and deli are fine. Eats 2 times a day. Reports reaching her water goals. Maintains 30-30-30 rule  B: 2 scrambled eggs   S: grapefruit  L: ackee and salt fish   D:   S: nuts    Exercise elliptical and squad machine       Concerns related to:  Nausea/Vomiting, Reflux: denies  Abdominal Pain: denies  Diarrhea/Constipation constipation occ    DAILY SUPPLEMENTS:  Calcium: Calcium Citrate w/ vitamin D (1200 - 1500mg) - yes  Multivitamin & Minerals: 2 per day - yes  Vitamin B12: 500 mcg/day - yes, monthly injection  Vitamin D3: 3000 units - included in Calcium Citrate   Iron/Other: E, biotin and probiotic  PPI:Omeprazole      Current Outpatient Medications   Medication Sig Dispense Refill    BIOTIN ORAL Take by mouth.      calcium carbonate-vitamin D3 500 mg-10 mcg (400 unit) chewable tablet TAKE 1 TABLET BY MOUTH THREE TIMES A DAY 90 tablet 1    cyanocobalamin (Vitamin B-12) 1,000 mcg/mL injection INJECT 1 ML (1,000 MCG) INTO THE MUSCLE EVERY 30 (THIRTY) DAYS. 9 mL 1    metoprolol succinate XL (Toprol-XL) 25 mg 24 hr tablet Take 1 tablet (25 mg) by mouth once daily. 90 tablet 3    omeprazole (PriLOSEC) 40 mg DR capsule Take 1 capsule (40 mg) by mouth once daily in the morning. Take before meals. Do not crush or chew. Open capsule, sprinkle beads on SF jello, pudding or applesauce. 30 capsule 5    vitamin E acetate (VITAMIN E ORAL) Take by mouth.      calcium citrate-vitamin D3 315 mg-6.25 mcg (250 unit) tablet Take 1  tablet (315 mg) by mouth 3 times a day. (Patient not taking: Reported on 12/24/2024)      ergocalciferol (Vitamin D-2) 1.25 MG (28087 UT) capsule TAKE 1 CAPSULE (80500 UNITS) BY MOUTH ONE TIME PER WEEK (Patient not taking: Reported on 12/24/2024) 13 capsule 0     No current facility-administered medications for this visit.       Comorbidities:  Patient Active Problem List   Diagnosis    Abnormal urine findings    Afib (Multi)    Atypical chest pain    Bacterial vaginosis    Dense breast    Decreased range of motion of right ankle    Eczema    Fatigue    Fatty liver    Fibrocystic breast    Fracture of foot    Gastroesophageal reflux disease with esophagitis    Genetic susceptibility to malignant neoplasm of breast    Herpes genitalia    Impaired fasting glucose    Impaired strength of lower extremity    Increased frequency of urination    Left wrist pain    Right wrist pain    Lower urinary tract infectious disease    Lumbar sprain    Nasal congestion    Neck pain    Obesity, unspecified    Obstructive sleep apnea syndrome    Polyphagia    Prehypertension    Right foot pain    Joint pain, foot    Right shoulder pain    S/P breast reconstruction    Sinusitis    Sore throat    Sprain of ankle    Sprain of ligament of tarsometatarsal joint, right, initial encounter    Tachycardia    Tenosynovitis of left wrist    Vaginitis    Vitamin D deficiency    Anemia    Familial cancer of breast (Multi)    Hypothyroidism    Vitamin B12 deficiency    LLQ pain    Lower abdominal pain    Problems related to inappropriate diet and eating habits    Pre-operative clearance    History of bariatric surgery    History of colonic polyps    Routine general medical examination at a health care facility    Class 2 obesity with alveolar hypoventilation, serious comorbidity, and body mass index (BMI) of 38.0 to 38.9 in adult    Morbid obesity (Multi)         REVIEW OF SYSTEMS:  CONSTITUTIONAL: Patient denies fevers, chills, sweats and weight  changes.  CARDIOVASCULAR: Patient denies chest pains, palpitations, orthopnea and paroxysmal nocturnal dyspnea.  RESPIRATORY: No dyspnea on exertion, no wheezing or cough.  GI: No nausea, vomiting, diarrhea, constipation, abdominal pain, hematochezia or melena.  MUSCULOSKELETAL: No myalgias or arthralgias.  NEUROLOGIC: No chronic headaches, no seizures. Patient denies numbness, tingling or weakness.  PSYCHIATRIC: Patient denies problems with mood disturbance. No problems with anxiety.  ENDOCRINE: No excessive urination or excessive thirst.  DERMATOLOGIC: Patient denies any rashes or skin changes.    PHYSICAL EXAM:  BP 97/71   Pulse 99   Temp 36.2 °C (97.2 °F)   Wt 85.3 kg (188 lb 1.6 oz)   SpO2 96%   BMI 31.30 kg/m²   Alert, well appearing, no acute distress, nourished, hydrated.  Anicteric sclera, no ptosis  Facial symmetry  Neck supple  Unlabored respirations.  Easily conversant without increased respiratory effort  Oriented to person, place, time.  Judgement intact.  Appropriate mood, affect.       ASSESSMENT & PLAN:  49 y.o. female presenting for 3 months follow up visit s/p bariatric surgery.    Current Weight: 188 lbs     Wt Readings from Last 1 Encounters:   12/24/24 85.3 kg (188 lb 1.6 oz)       Diagnoses and all orders for this visit:  Intestinal malabsorption following gastrectomy ([facility])  -     Iron and TIBC; Future  -     Ferritin; Future  -     Vitamin D 25-Hydroxy,Total (for eval of Vitamin D levels); Future  -     Parathyroid Hormone, Intact; Future  -     Comprehensive Metabolic Panel; Future  -     Folate; Future  -     Vitamin B1, Whole Blood; Future  -     Vitamin B12; Future  -     CBC; Future  History of bariatric surgery  -     Iron and TIBC; Future  -     Ferritin; Future  -     Vitamin D 25-Hydroxy,Total (for eval of Vitamin D levels); Future  -     Parathyroid Hormone, Intact; Future  -     Comprehensive Metabolic Panel; Future  -     Folate; Future  -     Vitamin B1, Whole Blood;  Future  -     Vitamin B12; Future  -     CBC; Future      You are 3 months s/p Sleeve gastrectomy  Please have your 3 month lab work completed today, we will call with any abnormal results.  Be sure to take your multivitamins, your B12 and your calcium daily.   Continue to meeting 60 gram protein 64 oz fluid goals daily  DO NOT eat w meals   Do NOT skip meals  Eat protein fiirst  Eat slowly Chew well.  Continue taking omeprazole  Continue to work on exercise, increase the intensity and variety of your exercise routine, aim for 300mins a week total.   Come to support groups.  Follow-up with the Dietician as needed.   Follow-up in 3 months for your 6 month visit.      I personally spent >50% of total minutes face to face with the patient in counseling and discussion and/or coordination of care as described above.

## 2024-12-24 NOTE — PATIENT INSTRUCTIONS
"Instructions / Recommendations:   You should be drinking at least 60 oz of noncaffeinated fluid daily. Avoid carbonated beverages and those with sugar such as juices.    You should be getting around 60 g of protein daily. Try to get as much of this through solid food as possible. Avoid shakes and use them when you are in a rush only.    You should plan your meals for the week. You should make grocery lists and pack your lunches. Avoid fast food and picking \"convenience\" foods. Try to only eat what you have planned or packed.    Do not skip meals. You will not be able to eat a large volume at any one meal. Therefore you will need to eat your protein and vegetables through the day. Aim for 3 meals and 2 snacks daily.    Do not eat and drink at the same time. Eat 3 meals and 2 snacks daily. Eat your protein first, vegetables second and starch last.    Set alarms to remind you to eat and drink throughout the day. After this surgery many patients do not feel hungry. By setting alarms you will remember it is time to eat or drink. Try to eat on this schedule to avoid weakness and malnutrition.    Remember to take your vitamin supplements as directed. If you have any questions or concerns regarding your vitamins, please contact the dietician.    You need to increase your daily exercise. Your goal is 60 minutes/day. This is part of a healthy lifestyle. You can try walking, swimming, going to a local gym, or even chair dancing! The more movement you have in your day, the healthier you will feel. Once you reach 60 minutes then increase the intensity of your exercise.    3 month labs: We will check basic labs and the nurse will call you with any abnormalities.     If appointment was not scheduled before leaving today please call [phone] to speak with an .    Follow up visits are typically VIRTUAL  - Please have a reliable scale to weigh yourself at home for follow up visits  - Please have a blood " pressure cuff to monitor blood pressure & heart rate at home (can be purchased over the counter, on Amazon, drug store, etc).   - For virtual visits you must be in the State of [place]  - YOU CANNOT BE DRIVING, please remember this is an appointment and should be treated the same as if you were in the office for follow up appointment.     The patient was instructed to follow up in 3 months.
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
## 2024-12-28 LAB — VIT B1 PYROPHOSHATE BLD-SCNC: 116 NMOL/L (ref 70–180)

## 2025-01-17 DIAGNOSIS — I48.91 UNSPECIFIED ATRIAL FIBRILLATION (MULTI): ICD-10-CM

## 2025-01-17 RX ORDER — METOPROLOL SUCCINATE 25 MG/1
25 TABLET, EXTENDED RELEASE ORAL DAILY
Qty: 90 TABLET | Refills: 3 | Status: SHIPPED | OUTPATIENT
Start: 2025-01-17 | End: 2026-01-17

## 2025-01-20 ENCOUNTER — OFFICE VISIT (OUTPATIENT)
Dept: CARDIOLOGY | Facility: HOSPITAL | Age: 50
End: 2025-01-20
Payer: COMMERCIAL

## 2025-01-20 VITALS
HEIGHT: 65 IN | SYSTOLIC BLOOD PRESSURE: 107 MMHG | BODY MASS INDEX: 30.32 KG/M2 | DIASTOLIC BLOOD PRESSURE: 74 MMHG | WEIGHT: 182 LBS | HEART RATE: 82 BPM | OXYGEN SATURATION: 97 %

## 2025-01-20 DIAGNOSIS — I48.0 PAROXYSMAL ATRIAL FIBRILLATION (MULTI): Primary | ICD-10-CM

## 2025-01-20 PROCEDURE — 1036F TOBACCO NON-USER: CPT | Performed by: INTERNAL MEDICINE

## 2025-01-20 PROCEDURE — 99213 OFFICE O/P EST LOW 20 MIN: CPT | Performed by: INTERNAL MEDICINE

## 2025-01-20 PROCEDURE — 93005 ELECTROCARDIOGRAM TRACING: CPT | Performed by: INTERNAL MEDICINE

## 2025-01-20 PROCEDURE — 3008F BODY MASS INDEX DOCD: CPT | Performed by: INTERNAL MEDICINE

## 2025-01-20 PROCEDURE — 93010 ELECTROCARDIOGRAM REPORT: CPT | Performed by: INTERNAL MEDICINE

## 2025-01-20 NOTE — PROGRESS NOTES
"Chief Complaint:   Follow-up     History Of Present Illness:    Ayanna Willard is a 49 y.o. female here for followup. Has a history of DARBY and obesity and known lone paroxysmal atrial fibrillation with rates ranging between low 100's to 120's off therapy. She was historically intolerant of Toprol at higher doses but has been able to take her current 12.5 mg dose which she has been compliant with.      She reports doing well. Has not noted any episodes of atrial fibrillation per her SmartWatch which she checks frequently. Denies other cardiovascular complaints. She lost some 50 lbs since her bariatric surgery.        Last Recorded Vitals:  Vitals:    01/20/25 1413   BP: 107/74   Pulse: 82   SpO2: 97%   Weight: 82.6 kg (182 lb)   Height: 1.651 m (5' 5\")               Allergies:  Patient has no known allergies.    Outpatient Medications:  Current Outpatient Medications   Medication Instructions    BIOTIN ORAL Take by mouth.    calcium carbonate-vitamin D3 500 mg-10 mcg (400 unit) chewable tablet 1 tablet, oral, 3 times daily    cyanocobalamin (VITAMIN B-12) 1,000 mcg, intramuscular, Every 30 days    metoprolol succinate XL (TOPROL-XL) 25 mg, oral, Daily    omeprazole (PRILOSEC) 40 mg, oral, Daily before breakfast, Do not crush or chew. Open capsule, sprinkle beads on SF jello, pudding or applesauce.    vitamin E acetate (VITAMIN E ORAL) Take by mouth.         Physical Exam:  Gen Well appearing adult female sitting up in NAD. Body mass index is 30.29 kg/m².   CV rrr. No m/r/g appreciated. No JVD or leg edema.    Pulm Lungs clear with normal respiratory effort.  Neuro Alert and conversant. Grossly nonfocal.       I reviewed the patient's ECG -  NSR. IVCD.    I reviewed most recent imaging / labs / and office notes as well as details of any recent hospitalizations or ED visits.    Assessment/Plan   1. Atrial fibrillation  Paroxysmal. Tolerating current Toprol dose. Low [id number] score--her risk / benefit profile favors " no anticoagulation at this time. This will change should she develop CHF, HTN, diabetes, etc or when she gets to age 66. Exercise encouraged. Ablation to be considered if her atrial fibrillation burden is to increase. She was cautioned on a drop in BP as she continues to lose weight. Will plan on reducing her Toprol should that occur with consideration of stopping it altogether and changing it to prn.        Follow-up 1 year        Martinez Garg MD

## 2025-01-21 ENCOUNTER — TELEPHONE (OUTPATIENT)
Dept: SURGERY | Facility: CLINIC | Age: 50
End: 2025-01-21

## 2025-01-21 LAB
ATRIAL RATE: 82 BPM
P AXIS: 36 DEGREES
P OFFSET: 187 MS
P ONSET: 134 MS
PR INTERVAL: 148 MS
Q ONSET: 208 MS
QRS COUNT: 14 BEATS
QRS DURATION: 110 MS
QT INTERVAL: 372 MS
QTC CALCULATION(BAZETT): 434 MS
QTC FREDERICIA: 412 MS
R AXIS: -30 DEGREES
T AXIS: 8 DEGREES
T OFFSET: 394 MS
VENTRICULAR RATE: 82 BPM

## 2025-01-21 NOTE — TELEPHONE ENCOUNTER
Unable to reach patient, left voicemail message for patient to return the call.     Patient needs 6 mo pov scheduled with KALYANI Jerri around 3/24.

## 2025-01-31 DIAGNOSIS — Z98.84 BARIATRIC SURGERY STATUS: ICD-10-CM

## 2025-01-31 DIAGNOSIS — E66.812 CLASS 2 OBESITY WITH BODY MASS INDEX (BMI) OF 38.0 TO 38.9 IN ADULT, UNSPECIFIED OBESITY TYPE, UNSPECIFIED WHETHER SERIOUS COMORBIDITY PRESENT: ICD-10-CM

## 2025-02-25 RX ORDER — OMEPRAZOLE 40 MG/1
40 CAPSULE, DELAYED RELEASE ORAL
Qty: 90 CAPSULE | Refills: 1 | Status: SHIPPED | OUTPATIENT
Start: 2025-02-25

## 2025-03-24 ENCOUNTER — NUTRITION (OUTPATIENT)
Dept: SURGERY | Facility: HOSPITAL | Age: 50
End: 2025-03-24
Payer: COMMERCIAL

## 2025-03-24 VITALS — BODY MASS INDEX: 28.79 KG/M2 | WEIGHT: 173 LBS

## 2025-03-24 DIAGNOSIS — Z90.3 S/P GASTRIC SLEEVE PROCEDURE: Primary | ICD-10-CM

## 2025-03-24 NOTE — PROGRESS NOTES
"Surgery Date:  9/23/24  Surgeon:  Yosef  Procedure:  sleeve gastrectomy      ASSESSMENT:  Current weight:      173 lb               Ht:      65    in.           BMI: 28.79  Previous weight:  185 lb  Initial start weight:   228 lbs;  234 lbs PREOP   EBW:  84 lbs  Total weight change: 61 lb  %EBW Lost:  72.6%    PROGRESS:    Nutrition Interventions for last encounter (12/24/24)  1.Eat 80  g of protein per day.Aim for 2 oz protein at each meal meal, 3x/day.  You can have 2 high protein snacks like cottage cheese, Greek yogurt, string cheese, tuna or chicken packets.  You can try protein bars like Quest, Pure Protein, or  Built Bars, Protein chips like Quest or Atkins  2. Continue to drink 64 oz. of zero calorie beverages per day  3.Continue no drinking 30 min before, during the meal and for 30 minutes after the meal  4.Increase intensity and duration of exercise  5.Continue current vit/min regimen  6.  Attend monthly support group    CHANGES IN TREATMENT:   Patient met goals:     Partially   Actions to meet previous goals:     24 hour food recall:  7pm-7am  Breakfast:  6PM - fruit   Snack:   Lunch:  12AM - \"dinner\" - cabbage, protein  Snack:     Dinner:  3-4 AM - may just finish dinner   Snack:   Beverages:  protein water 1-2/day, 30oz   Alcohol:  none      Vitamins: 2 MVI, 1200 mg calcium, B12  Medications: see list  Physical Activity: home exercises with 20 minutes 3 days/week  Nausea/Vomiting/Diarrhea/Constipation: none    READINESS TO LEARN:  Motivation to learn:           Interested      Understanding of instruction: Good     Anticipated Compliance: Good        Family Support: Unable to assess-family not present       Patient presents 6 months postop LSG.  Weight today is self reported. Patient has lost 61 pounds since initial assessment accounting for 72.6% loss excess body weight.    Tolerating a Regular diet without difficulty.  Diet recall shows maybe 2 meals/day consumed. 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 Protein intake is not adequate for post-op individual. Reviewed importance of eating protein at every meal and discussed options to add for meal 1 and snack after her shift. Reinforced meeting 80g protein/day or more. Fluid consumption is adequate. Patient is supplementing recommended vitamin/minerals. Pt is exercising regularly, discussed adding cardio 2 days/week. Pt expressed she is at her goal weight and would like to maintain her weight. Advised she will most likely maintain weight with increasing her protein intake as it will add healthy calories into her meal plan.           Malnutrition Screening  Significant unintentional weight loss? n/a  Eating less than 75% of usual intake for more than 2 weeks? n/a      Nutrition Diagnosis:   1. Increased protein and nutrition needs related to altered GI function as evidenced by pt. s/p sleeve gastrectomy.       Nutrition Interventions:   1. Modify type and amount of food and nutrients within meals and snacks.  2. Comprehensive Nutrition Education  -Nutrition education materials: none      Recommendations:    1.         Eat 80  g of protein per day.Aim for 2 oz protein at each meal meal, 3x/day.  You can have 2 high protein snacks like cottage cheese, Greek yogurt, string cheese, tuna or chicken packets.  You can try protein bars like Quest, Pure Protein, or  Built Bars, Protein chips like Quest or Atkins  2. Continue to drink 64 oz. of zero calorie beverages per day  3. Continue no drinking 30 min before, during the meal and for 30 minutes after the meal  4. Continue to follow vit/min regimen   5. Continue to exercise. Add cardio 20 minutes 2 days/week.   6.         Attend our Virtual Support Group monthly!    Nutrition Monitoring and Evaluation:   Weight loss1-2 pounds per week  Criteria: weight check, food recall  Need for Follow-up: 9 months post op

## 2025-04-02 ENCOUNTER — OFFICE VISIT (OUTPATIENT)
Dept: SURGERY | Facility: CLINIC | Age: 50
End: 2025-04-02
Payer: COMMERCIAL

## 2025-04-02 VITALS
TEMPERATURE: 97.3 F | DIASTOLIC BLOOD PRESSURE: 85 MMHG | OXYGEN SATURATION: 96 % | HEART RATE: 89 BPM | BODY MASS INDEX: 28.57 KG/M2 | RESPIRATION RATE: 20 BRPM | SYSTOLIC BLOOD PRESSURE: 116 MMHG | WEIGHT: 171.7 LBS

## 2025-04-02 DIAGNOSIS — K91.2 INTESTINAL MALABSORPTION FOLLOWING GASTRECTOMY (HHS-HCC): Primary | ICD-10-CM

## 2025-04-02 DIAGNOSIS — Z90.3 INTESTINAL MALABSORPTION FOLLOWING GASTRECTOMY (HHS-HCC): Primary | ICD-10-CM

## 2025-04-02 DIAGNOSIS — Z98.84 HISTORY OF BARIATRIC SURGERY: ICD-10-CM

## 2025-04-02 PROCEDURE — 99211 OFF/OP EST MAY X REQ PHY/QHP: CPT

## 2025-04-02 ASSESSMENT — PAIN SCALES - GENERAL: PAINLEVEL_OUTOF10: 0-NO PAIN

## 2025-04-02 NOTE — PATIENT INSTRUCTIONS
Congratulations on your weight loss!    The following are the recommendations we discussed at your appointment today:    Nutrition  See the dietician as needed. These visits are free to you and important to your weight loss journey!  Continue to aim for 60 grams of protein and 60 oz of water daily.  Follow Pouch Rules; handout given today.   Eat 5 servings of fruits and veggies daily.  Eat 3 small meals and 1-2 healthy, protein rich, snacks per day.    Exercise Recommendations:   Aim for 30 minutes per day, 5 days per week to start, with progression over several weeks to more vigorous intensity.   Emphasize increasing duration, rather than intensity initially.   Moderate-intensity physical activity includes:  - Brisk Walking  - Biking (slower than 10 MPH)  - Water Aerobics  - Vigorous housework (washing windows, vacuuming, mopping)  - Mowing the lawn (push mower)  - Gardening  - Ballroom dancing     Fluids  Drink at least 60 oz of water every day.   Wait 30 minutes before or after meals to drink fluids.  Avoid carbonated beverages.    Vitamins  Remember to take your multivitamins 2 times daily, once in the morning and once in the evening  Take your calcium 2-3 times daily, at least 2 hours apart from the multivitamin    Come to support groups! View the schedule to find a time and location.     Labs  We will check labs today and call with abnormal values.   We will send prescriptions for abnormal values.   A copy of the results can be viewed on [facility] portal.   If you are not on the [facility] health portal, a copy will be mailed to your home.    Follow-up in 6 months, or sooner as needed.   To schedule an appointment call: [phone]

## 2025-04-02 NOTE — PROGRESS NOTES
BARIATRIC SURGERY CLINIC  6 months FOLLOW UP NOTE      Name: Ayanna Willard  MRN: [id number]    Index Surgery  Date of Surgery: 09/23/2024   Surgeon: Yosef    Surgical Procedure: Laparoscopic sleeve gastrectomy  77230    HPI:   Presenting for 3 months s/p sleeve gastrectomy follow up visit. Feels good, but sometimes she feels a bit tired, assigns it to not drinking enough water and eating enough protein. 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      Initial weight: 234 lbs  Previous weight: 188 lbs  Current weight: 171 lbs  Current BMI: 28.5  Weight loss: 63 lbs    Diet: Regular. Reports having about 1000 shen/day. She does not think she reaches her protein goal. Meat sits very heavy in her body. Fish and deli are fine. Eats 2 times a day. Reports she might not be reaching her water goals as well. Maintains 30-30-30 rule.   B: 2 HBE   S: 1/2 of salmon   L: yogurt  D: 1/2 of salmon   S: skipped    Exercise elliptical and squad machine       Concerns related to:  Nausea/Vomiting, Reflux: denies  Abdominal Pain: denies  Diarrhea/Constipation constipation occ    DAILY SUPPLEMENTS:  Calcium: Calcium Citrate w/ vitamin D (1200 - 1500mg) - yes  Multivitamin & Minerals: 2 per day - yes  Vitamin B12: 500 mcg/day - yes, monthly injection  Vitamin D3: 3000 units - included in Calcium Citrate   Iron/Other: E, biotin and probiotic  PPI:none      Current Outpatient Medications   Medication Sig Dispense Refill    BIOTIN ORAL Take by mouth.      calcium carbonate-vitamin D3 500 mg-10 mcg (400 unit) chewable tablet TAKE 1 TABLET BY MOUTH THREE TIMES A DAY 90 tablet 1    cyanocobalamin (Vitamin B-12) 1,000 mcg/mL injection INJECT 1 ML (1,000 MCG) INTO THE MUSCLE EVERY 30 (THIRTY) DAYS. 9 mL 1    metoprolol succinate XL (Toprol-XL) 25 mg 24 hr tablet Take 1 tablet (25 mg) by mouth once daily. 90 tablet 3    vitamin E acetate (VITAMIN E ORAL) Take by mouth.       No current facility-administered medications for this visit.        Comorbidities:  Patient Active Problem List   Diagnosis    Abnormal urine findings    Afib (Multi)    Atypical chest pain    Bacterial vaginosis    Dense breast    Decreased range of motion of right ankle    Eczema    Fatigue    Fatty liver    Fibrocystic breast    Fracture of foot    Gastroesophageal reflux disease with esophagitis    Genetic susceptibility to malignant neoplasm of breast    Herpes genitalia    Impaired fasting glucose    Impaired strength of lower extremity    Increased frequency of urination    Left wrist pain    Right wrist pain    Lower urinary tract infectious disease    Lumbar sprain    Nasal congestion    Neck pain    Obesity, unspecified    Obstructive sleep apnea syndrome    Polyphagia    Prehypertension    Right foot pain    Joint pain, foot    Right shoulder pain    S/P breast reconstruction    Sinusitis    Sore throat    Sprain of ankle    Sprain of ligament of tarsometatarsal joint, right, initial encounter    Tachycardia    Tenosynovitis of left wrist    Vaginitis    Vitamin D deficiency    Anemia    Familial cancer of breast    Hypothyroidism    Vitamin B12 deficiency    LLQ pain    Lower abdominal pain    Problems related to inappropriate diet and eating habits    Pre-operative clearance    History of bariatric surgery    History of colonic polyps    Routine general medical examination at a health care facility    Class 2 obesity with alveolar hypoventilation, serious comorbidity, and body mass index (BMI) of 38.0 to 38.9 in adult    Morbid obesity (Multi)    Intestinal malabsorption following gastrectomy ([facility])         REVIEW OF SYSTEMS:  CONSTITUTIONAL: Patient denies fevers, chills, sweats and weight changes.  CARDIOVASCULAR: Patient denies chest pains, palpitations, orthopnea and paroxysmal nocturnal dyspnea.  RESPIRATORY: No dyspnea on exertion, no wheezing or cough.  GI: No nausea, vomiting, diarrhea, constipation, abdominal pain, hematochezia or  melena.  MUSCULOSKELETAL: No myalgias or arthralgias.  NEUROLOGIC: No chronic headaches, no seizures. Patient denies numbness, tingling or weakness.  PSYCHIATRIC: Patient denies problems with mood disturbance. No problems with anxiety.  ENDOCRINE: No excessive urination or excessive thirst.  DERMATOLOGIC: Patient denies any rashes or skin changes. Hair falling out    PHYSICAL EXAM:  /85 (BP Location: Right arm, Patient Position: Sitting, BP Cuff Size: Adult)   Pulse 89   Temp 36.3 °C (97.3 °F) (Temporal)   Resp 20   Wt 77.9 kg (171 lb 11.2 oz)   SpO2 96%   BMI 28.57 kg/m²   Alert, well appearing, no acute distress, nourished, hydrated.  Anicteric sclera, no ptosis  Facial symmetry  Neck supple  Unlabored respirations.  Easily conversant without increased respiratory effort  Oriented to person, place, time.  Judgement intact.  Appropriate mood, affect.       ASSESSMENT & PLAN:  49 y.o. female presenting for 3 months follow up visit s/p bariatric surgery.    Current Weight: 188 lbs     Wt Readings from Last 1 Encounters:   04/02/25 77.9 kg (171 lb 11.2 oz)       Diagnoses and all orders for this visit:  Intestinal malabsorption following gastrectomy ([facility])  -     CBC and Auto Differential; Future  -     Comprehensive Metabolic Panel; Future  -     Ferritin; Future  -     Folate; Future  -     Iron and TIBC; Future  -     Vitamin B12; Future  -     Vitamin D 25-Hydroxy,Total (for eval of Vitamin D levels); Future  History of bariatric surgery  -     CBC and Auto Differential; Future  -     Comprehensive Metabolic Panel; Future  -     Ferritin; Future  -     Folate; Future  -     Iron and TIBC; Future  -     Vitamin B12; Future  -     Vitamin D 25-Hydroxy,Total (for eval of Vitamin D levels); Future        You are 6 months s/p Sleeve gastrectomy  Please have your 3 month lab work completed today, we will call with any abnormal results.  Be sure to take your multivitamins, your B12 and your calcium daily.    Continue to meeting 60 gram protein 64 oz fluid goals daily  DO NOT eat w meals   Do NOT skip meals  Eat protein fiirst  Eat slowly Chew well.  Omeprazole discontinued  Continue to work on exercise, increase the intensity and variety of your exercise routine, aim for 300mins a week total.   Come to support groups.  Follow-up with the Dietician as needed.   Follow-up in 6 months for your 12 month visit.      I personally spent >50% of total minutes face to face with the patient in counseling and discussion and/or coordination of care as described above.

## 2025-04-03 LAB
25(OH)D3+25(OH)D2 SERPL-MCNC: 55 NG/ML (ref 30–100)
ALBUMIN SERPL-MCNC: 4.5 G/DL (ref 3.6–5.1)
ALP SERPL-CCNC: 67 U/L (ref 31–125)
ALT SERPL-CCNC: 21 U/L (ref 6–29)
ANION GAP SERPL CALCULATED.4IONS-SCNC: 11 MMOL/L (CALC) (ref 7–17)
AST SERPL-CCNC: 15 U/L (ref 10–35)
BASOPHILS # BLD AUTO: 41 CELLS/UL (ref 0–200)
BASOPHILS NFR BLD AUTO: 0.6 %
BILIRUB SERPL-MCNC: 0.5 MG/DL (ref 0.2–1.2)
BUN SERPL-MCNC: 13 MG/DL (ref 7–25)
CALCIUM SERPL-MCNC: 9.7 MG/DL (ref 8.6–10.2)
CHLORIDE SERPL-SCNC: 103 MMOL/L (ref 98–110)
CO2 SERPL-SCNC: 27 MMOL/L (ref 20–32)
CREAT SERPL-MCNC: 0.77 MG/DL (ref 0.5–0.99)
EGFRCR SERPLBLD CKD-EPI 2021: 95 ML/MIN/1.73M2
EOSINOPHIL # BLD AUTO: 138 CELLS/UL (ref 15–500)
EOSINOPHIL NFR BLD AUTO: 2 %
ERYTHROCYTE [DISTWIDTH] IN BLOOD BY AUTOMATED COUNT: 13.1 % (ref 11–15)
FERRITIN SERPL-MCNC: 221 NG/ML (ref 16–232)
FOLATE SERPL-MCNC: >24 NG/ML
GLUCOSE SERPL-MCNC: 92 MG/DL (ref 65–99)
HCT VFR BLD AUTO: 40 % (ref 35–45)
HGB BLD-MCNC: 13.3 G/DL (ref 11.7–15.5)
IRON SATN MFR SERPL: 11 % (CALC) (ref 16–45)
IRON SERPL-MCNC: 34 MCG/DL (ref 40–190)
LYMPHOCYTES # BLD AUTO: 2739 CELLS/UL (ref 850–3900)
LYMPHOCYTES NFR BLD AUTO: 39.7 %
MCH RBC QN AUTO: 28.3 PG (ref 27–33)
MCHC RBC AUTO-ENTMCNC: 33.3 G/DL (ref 32–36)
MCV RBC AUTO: 85.1 FL (ref 80–100)
MONOCYTES # BLD AUTO: 676 CELLS/UL (ref 200–950)
MONOCYTES NFR BLD AUTO: 9.8 %
NEUTROPHILS # BLD AUTO: 3305 CELLS/UL (ref 1500–7800)
NEUTROPHILS NFR BLD AUTO: 47.9 %
PLATELET # BLD AUTO: 197 THOUSAND/UL (ref 140–400)
PMV BLD REES-ECKER: 9.6 FL (ref 7.5–12.5)
POTASSIUM SERPL-SCNC: 3.7 MMOL/L (ref 3.5–5.3)
PROT SERPL-MCNC: 6.7 G/DL (ref 6.1–8.1)
RBC # BLD AUTO: 4.7 MILLION/UL (ref 3.8–5.1)
SODIUM SERPL-SCNC: 141 MMOL/L (ref 135–146)
TIBC SERPL-MCNC: 300 MCG/DL (CALC) (ref 250–450)
VIT B12 SERPL-MCNC: 496 PG/ML (ref 200–1100)
WBC # BLD AUTO: 6.9 THOUSAND/UL (ref 3.8–10.8)

## 2025-04-08 DIAGNOSIS — E53.8 VITAMIN B12 DEFICIENCY: ICD-10-CM

## 2025-04-08 RX ORDER — CYANOCOBALAMIN 1000 UG/ML
1000 INJECTION, SOLUTION INTRAMUSCULAR; SUBCUTANEOUS
Qty: 3 ML | Refills: 3 | Status: SHIPPED | OUTPATIENT
Start: 2025-04-08 | End: 2026-04-03

## 2025-06-09 ENCOUNTER — APPOINTMENT (OUTPATIENT)
Dept: PRIMARY CARE | Facility: CLINIC | Age: 50
End: 2025-06-09
Payer: COMMERCIAL

## 2025-06-10 ENCOUNTER — OFFICE VISIT (OUTPATIENT)
Dept: PRIMARY CARE | Facility: CLINIC | Age: 50
End: 2025-06-10
Payer: COMMERCIAL

## 2025-06-10 VITALS
HEART RATE: 76 BPM | BODY MASS INDEX: 29.16 KG/M2 | DIASTOLIC BLOOD PRESSURE: 81 MMHG | HEIGHT: 65 IN | SYSTOLIC BLOOD PRESSURE: 115 MMHG | WEIGHT: 175 LBS

## 2025-06-10 DIAGNOSIS — Z00.00 ROUTINE GENERAL MEDICAL EXAMINATION AT A HEALTH CARE FACILITY: Primary | ICD-10-CM

## 2025-06-10 DIAGNOSIS — Z12.4 CERVICAL CANCER SCREENING: ICD-10-CM

## 2025-06-10 DIAGNOSIS — Z13.1 SCREENING FOR DIABETES MELLITUS: ICD-10-CM

## 2025-06-10 DIAGNOSIS — Z13.29 SCREENING FOR THYROID DISORDER: ICD-10-CM

## 2025-06-10 DIAGNOSIS — Z98.84 HISTORY OF BARIATRIC SURGERY: ICD-10-CM

## 2025-06-10 DIAGNOSIS — E55.9 VITAMIN D DEFICIENCY: ICD-10-CM

## 2025-06-10 DIAGNOSIS — Z13.220 SCREENING FOR LIPID DISORDERS: ICD-10-CM

## 2025-06-10 PROCEDURE — 87626 HPV SEP HI-RSK TYP&POOL RSLT: CPT

## 2025-06-10 PROCEDURE — 99396 PREV VISIT EST AGE 40-64: CPT | Performed by: FAMILY MEDICINE

## 2025-06-10 PROCEDURE — 3008F BODY MASS INDEX DOCD: CPT | Performed by: FAMILY MEDICINE

## 2025-06-10 PROCEDURE — 88175 CYTOPATH C/V AUTO FLUID REDO: CPT

## 2025-06-12 LAB
25(OH)D3+25(OH)D2 SERPL-MCNC: 63 NG/ML (ref 30–100)
ALBUMIN SERPL-MCNC: 4.5 G/DL (ref 3.6–5.1)
ALP SERPL-CCNC: 67 U/L (ref 31–125)
ALT SERPL-CCNC: 19 U/L (ref 6–29)
ANION GAP SERPL CALCULATED.4IONS-SCNC: 9 MMOL/L (CALC) (ref 7–17)
AST SERPL-CCNC: 14 U/L (ref 10–35)
BILIRUB SERPL-MCNC: 0.6 MG/DL (ref 0.2–1.2)
BUN SERPL-MCNC: 10 MG/DL (ref 7–25)
CALCIUM SERPL-MCNC: 9.4 MG/DL (ref 8.6–10.2)
CHLORIDE SERPL-SCNC: 103 MMOL/L (ref 98–110)
CHOLEST SERPL-MCNC: 179 MG/DL
CHOLEST/HDLC SERPL: 2.6 (CALC)
CO2 SERPL-SCNC: 29 MMOL/L (ref 20–32)
CREAT SERPL-MCNC: 0.8 MG/DL (ref 0.5–0.99)
EGFRCR SERPLBLD CKD-EPI 2021: 91 ML/MIN/1.73M2
ERYTHROCYTE [DISTWIDTH] IN BLOOD BY AUTOMATED COUNT: 13.2 % (ref 11–15)
EST. AVERAGE GLUCOSE BLD GHB EST-MCNC: 111 MG/DL
EST. AVERAGE GLUCOSE BLD GHB EST-SCNC: 6.2 MMOL/L
FERRITIN SERPL-MCNC: 259 NG/ML (ref 16–232)
GLUCOSE SERPL-MCNC: 83 MG/DL (ref 65–99)
HBA1C MFR BLD: 5.5 %
HCT VFR BLD AUTO: 43.6 % (ref 35–45)
HDLC SERPL-MCNC: 68 MG/DL
HGB BLD-MCNC: 13.8 G/DL (ref 11.7–15.5)
IRON SATN MFR SERPL: 38 % (CALC) (ref 16–45)
IRON SERPL-MCNC: 119 MCG/DL (ref 40–190)
LDLC SERPL CALC-MCNC: 98 MG/DL (CALC)
MCH RBC QN AUTO: 27.6 PG (ref 27–33)
MCHC RBC AUTO-ENTMCNC: 31.7 G/DL (ref 32–36)
MCV RBC AUTO: 87.2 FL (ref 80–100)
NONHDLC SERPL-MCNC: 111 MG/DL (CALC)
PLATELET # BLD AUTO: 246 THOUSAND/UL (ref 140–400)
PMV BLD REES-ECKER: 9 FL (ref 7.5–12.5)
POTASSIUM SERPL-SCNC: 4 MMOL/L (ref 3.5–5.3)
PROT SERPL-MCNC: 6.7 G/DL (ref 6.1–8.1)
RBC # BLD AUTO: 5 MILLION/UL (ref 3.8–5.1)
SODIUM SERPL-SCNC: 141 MMOL/L (ref 135–146)
TIBC SERPL-MCNC: 313 MCG/DL (CALC) (ref 250–450)
TRIGL SERPL-MCNC: 50 MG/DL
TSH SERPL-ACNC: 0.69 MIU/L
VIT B12 SERPL-MCNC: 573 PG/ML (ref 200–1100)
WBC # BLD AUTO: 4.2 THOUSAND/UL (ref 3.8–10.8)

## 2025-06-15 PROBLEM — Z13.29 SCREENING FOR THYROID DISORDER: Status: ACTIVE | Noted: 2025-06-15

## 2025-06-15 PROBLEM — Z13.1 SCREENING FOR DIABETES MELLITUS: Status: ACTIVE | Noted: 2025-06-15

## 2025-06-15 PROBLEM — Z12.4 CERVICAL CANCER SCREENING: Status: ACTIVE | Noted: 2025-06-15

## 2025-06-15 PROBLEM — Z13.220 SCREENING FOR LIPID DISORDERS: Status: ACTIVE | Noted: 2025-06-15

## 2025-06-15 NOTE — PROGRESS NOTES
"  Subjective     Patient ID: Ayanna Willard is a 49 y.o. female who presents for Annual Exam.      Last Physical : 1 Years ago     Pt's PMH, PSH, SH, FH , meds and allergies was obtained / reviewed and updated .     Dental visits : Y  Vision issues : N  Hearing issues : N    Immunizations : Y    Diet :  could be better  Exercise:  Weight concerns :     Alcohol: as noted in SH  Tobacco: as noted in SH  Recreational drug use : None/ as noted in SH    Sexually active : Active   Contraception :   Menstrual problems:  Premenopausal/perimenopausal/ postmenopausal:    G:  Parity:  Full term:    Premature:   (s):   Living :  Ab induced:   Ab spontaneous :  Ectopic :   Multiple :    PAP smear :  Mammogram :na  Colonoscopy:    Metabolic screening   - Lipid   - Glucose  Drinking 6-8 glasses of water  Exercising 30mins daily  History of bilateral mastectomy  Up-to-date with colonoscopy  Up-to-date with Pap  ======================================================    Visit Vitals  /81   Pulse 76   Ht 1.651 m (5' 5\")   Wt 79.4 kg (175 lb)   BMI 29.12 kg/m²   OB Status Oopherectomy   Smoking Status Former   BSA 1.91 m²      No LMP recorded. (Menstrual status: Oopherectomy).     =====================================    Review of systems:  Constitutional: no chills, no fever and no night sweats.     Eyes: no blurred vision and no eyesight problems.     ENT: no hearing loss, no nasal congestion, no nasal discharge, no hoarseness and no sore throat.     Cardiovascular: no chest pain, no intermittent leg claudication, no lower extremity edema, no palpitations and no syncope.     Respiratory: no cough, no shortness of breath during exertion, no shortness of breath at rest and no wheezing.     Gastrointestinal: no abdominal pain, no blood in stools, no constipation, no diarrhea, no melena, no nausea, no rectal pain and no vomiting.     Genitourinary: no dysuria, no change in urinary frequency, no urinary hesitancy, no " feelings of urinary urgency and no vaginal discharge.     Musculoskeletal: no arthralgias, no back pain and no myalgias.     Integumentary: no new skin lesions and no rashes.     Neurological: no difficulty walking, no headache, no limb weakness, no numbness and no tingling.     Psychiatric: no anxiety, no depression, no anhedonia and no substance use disorders.     Endocrine: no recent weight gain and no recent weight loss.     Hematologic/Lymphatic: no tendency for easy bruising and no swollen glands.   ============================================================    Physical exam :    Constitutional: Alert and in no acute distress. Well developed, well nourished.     Eyes: Normal external exam. Pupils were equal in size, round, reactive to light (PERRL) with normal accommodation and extraocular movements intact (EOMI).     Ears, Nose, Mouth, and Throat: External inspection of ears and nose: Normal.  Otoscopic examination: Normal.      Neck: No neck mass was observed. Supple.     Cardiovascular: Heart rate and rhythm were normal, normal S1 and S2, no gallops, no murmurs and no pericardial rub    Pulmonary: No respiratory distress. Clear bilateral breath sounds.     Abdomen: Soft nontender; no abdominal mass palpated. No organomegaly.     Musculoskeletal: No joint swelling seen, normal movements of all extremities. Range of motion: Normal.  Muscle strength/tone: Normal.      Skin: Normal skin color and pigmentation, normal skin turgor, and no rash.     Neurologic: Deep tendon reflexes were 2+ and symmetric. Sensation: Normal.     Psychiatric: Judgment and insight: Intact. Mood and affect: Normal.    Lymphatic : Cervical/ axillary/ groin Lns Palpable/ non palpable            All other systems have been reviewed and are negative for complaint.      Assessment/Plan    Problem List Items Addressed This Visit           ICD-10-CM    Vitamin D deficiency E55.9    Relevant Orders    Lipid Panel (Completed)    CBC  (Completed)    TSH with reflex to Free T4 if abnormal (Completed)    Hemoglobin A1C (Completed)    Comprehensive Metabolic Panel (Completed)    Vitamin B12 (Completed)    Vitamin D 25-Hydroxy,Total (for eval of Vitamin D levels) (Completed)    Ferritin (Completed)    Iron and TIBC (Completed)    History of bariatric surgery Z98.84    Relevant Orders    Lipid Panel (Completed)    CBC (Completed)    TSH with reflex to Free T4 if abnormal (Completed)    Hemoglobin A1C (Completed)    Comprehensive Metabolic Panel (Completed)    Vitamin B12 (Completed)    Vitamin D 25-Hydroxy,Total (for eval of Vitamin D levels) (Completed)    Ferritin (Completed)    Iron and TIBC (Completed)    Routine general medical examination at a health care facility - Primary Z00.00    Relevant Orders    Lipid Panel (Completed)    CBC (Completed)    TSH with reflex to Free T4 if abnormal (Completed)    Hemoglobin A1C (Completed)    Comprehensive Metabolic Panel (Completed)    Vitamin B12 (Completed)    Vitamin D 25-Hydroxy,Total (for eval of Vitamin D levels) (Completed)    Ferritin (Completed)    Iron and TIBC (Completed)    Screening for diabetes mellitus Z13.1    Relevant Orders    Lipid Panel (Completed)    CBC (Completed)    TSH with reflex to Free T4 if abnormal (Completed)    Hemoglobin A1C (Completed)    Comprehensive Metabolic Panel (Completed)    Vitamin B12 (Completed)    Vitamin D 25-Hydroxy,Total (for eval of Vitamin D levels) (Completed)    Ferritin (Completed)    Iron and TIBC (Completed)    Screening for thyroid disorder Z13.29    Relevant Orders    Lipid Panel (Completed)    CBC (Completed)    TSH with reflex to Free T4 if abnormal (Completed)    Hemoglobin A1C (Completed)    Comprehensive Metabolic Panel (Completed)    Vitamin B12 (Completed)    Vitamin D 25-Hydroxy,Total (for eval of Vitamin D levels) (Completed)    Ferritin (Completed)    Iron and TIBC (Completed)    Screening for lipid disorders Z13.220    Relevant Orders     Lipid Panel (Completed)    CBC (Completed)    TSH with reflex to Free T4 if abnormal (Completed)    Hemoglobin A1C (Completed)    Comprehensive Metabolic Panel (Completed)    Vitamin B12 (Completed)    Vitamin D 25-Hydroxy,Total (for eval of Vitamin D levels) (Completed)    Ferritin (Completed)    Iron and TIBC (Completed)    Cervical cancer screening Z12.4    Relevant Orders    THINPREP PAP TEST    HPV DNA High Risk With Genotype

## 2025-06-23 ENCOUNTER — APPOINTMENT (OUTPATIENT)
Dept: SURGERY | Facility: HOSPITAL | Age: 50
End: 2025-06-23
Payer: COMMERCIAL

## 2025-06-25 LAB
CYTOLOGY CMNT CVX/VAG CYTO-IMP: NORMAL
HPV HR 12 DNA GENITAL QL NAA+PROBE: NEGATIVE
HPV HR GENOTYPES PNL CVX NAA+PROBE: NEGATIVE
HPV16 DNA SPEC QL NAA+PROBE: NEGATIVE
HPV18 DNA SPEC QL NAA+PROBE: NEGATIVE
LAB AP HPV GENOTYPE QUESTION: YES
LAB AP HPV HR: NORMAL
LABORATORY COMMENT REPORT: NORMAL
PATH REPORT.TOTAL CANCER: NORMAL

## (undated) DEVICE — PRE-CLEAN KIT, BEDSIDE, FIRST STEP

## (undated) DEVICE — Device

## (undated) DEVICE — CARE KIT, LAPAROSCOPIC, ADVANCED

## (undated) DEVICE — SEAMGUARD, SUREFORM 60, GREEN//BLUE, FOR ROBOTIC ENDO

## (undated) DEVICE — STAPLER, ECHELON 3000, 60MM LONG

## (undated) DEVICE — GOWN, SURGICAL, SMARTGOWN, XLARGE, STERILE

## (undated) DEVICE — LIGASURE, L-HOOK 44CM SEALER/DIVIDER LAP, MARYLAND

## (undated) DEVICE — SUTURE, VICRYL, 4-0, 18 IN, PS2, UNDYED

## (undated) DEVICE — TROCAR, KII OPTICAL BLADELESS 5MM Z THREAD 100MM LNGTH

## (undated) DEVICE — SUTURE, ETHILON, 3-0, 18 IN, PS1, BLACK

## (undated) DEVICE — SYSTEM, GASTRECTOMY SLEEVE, 36FR W/BULB, VISIGI 3D

## (undated) DEVICE — TROCAR, OPTICAL, BLADELESS, 12MM, THREADED, 100MM LENGTH

## (undated) DEVICE — ADHESIVE, SKIN, DERMABOND ADVANCED, 15CM, PEN-STYLE

## (undated) DEVICE — STAPLER,  ENDO ECHELON 60MM RELOAD, GOLD,

## (undated) DEVICE — COVER, CART, 45 X 27 X 48 IN, CLEAR

## (undated) DEVICE — BITE BLOCK, ENDOSCOPY, ADULT, NS

## (undated) DEVICE — SYRINGE, LUER LOCK, 12ML

## (undated) DEVICE — STAPLER,  LINEAR ENDO 60MM RELOAD, GREEN, DISP

## (undated) DEVICE — PUMP, STRYKERFLOW 2 & HANDPIECE W/10FT. IRRIGATION TUBING

## (undated) DEVICE — DRAPE, TIBURON W/ADHESIVE, 19 X 30

## (undated) DEVICE — MANIFOLD, 4 PORT NEPTUNE STANDARD

## (undated) DEVICE — NEEDLE, SPINAL, 22 G X 3.5 IN, BLACK HUB

## (undated) DEVICE — SEAMGUARD, SUREFORM 60, BLACK, FOR ROBOTIC ENDO

## (undated) DEVICE — APPLICATOR, CHLORAPREP, W/ORANGE TINT, 26ML

## (undated) DEVICE — DRESSING, GAUZE, 16 PLY, 4 X 4 IN, STERILE

## (undated) DEVICE — TUBING, SUCTION, CONNECTING, STERILE 0.25 X 120 IN., LF

## (undated) DEVICE — TUBE SET, PNEUMOCLEAR, SMOKE EVACU, HIGH-FLOW

## (undated) DEVICE — SUTURE, MONOCRYL, 3-0, 18 IN, PS2, UNDYED

## (undated) DEVICE — STAPLER, LINEAR ENDO RELOAD, 60MM, BLUE, DISP